# Patient Record
Sex: FEMALE | Race: WHITE | URBAN - METROPOLITAN AREA
[De-identification: names, ages, dates, MRNs, and addresses within clinical notes are randomized per-mention and may not be internally consistent; named-entity substitution may affect disease eponyms.]

---

## 2017-02-27 ENCOUNTER — INPATIENT (INPATIENT)
Facility: HOSPITAL | Age: 82
LOS: 3 days | Discharge: HOME HEALTH | End: 2017-03-03
Attending: INTERNAL MEDICINE

## 2017-06-27 DIAGNOSIS — K92.2 GASTROINTESTINAL HEMORRHAGE, UNSPECIFIED: ICD-10-CM

## 2017-06-27 DIAGNOSIS — K25.9 GASTRIC ULCER, UNSPECIFIED AS ACUTE OR CHRONIC, WITHOUT HEMORRHAGE OR PERFORATION: ICD-10-CM

## 2017-06-27 DIAGNOSIS — K29.80 DUODENITIS WITHOUT BLEEDING: ICD-10-CM

## 2017-06-27 DIAGNOSIS — D62 ACUTE POSTHEMORRHAGIC ANEMIA: ICD-10-CM

## 2017-06-27 DIAGNOSIS — R09.02 HYPOXEMIA: ICD-10-CM

## 2017-06-27 DIAGNOSIS — J69.0 PNEUMONITIS DUE TO INHALATION OF FOOD AND VOMIT: ICD-10-CM

## 2017-06-27 DIAGNOSIS — K44.9 DIAPHRAGMATIC HERNIA WITHOUT OBSTRUCTION OR GANGRENE: ICD-10-CM

## 2017-06-27 DIAGNOSIS — J44.9 CHRONIC OBSTRUCTIVE PULMONARY DISEASE, UNSPECIFIED: ICD-10-CM

## 2017-06-27 DIAGNOSIS — K59.00 CONSTIPATION, UNSPECIFIED: ICD-10-CM

## 2017-06-27 DIAGNOSIS — K20.9 ESOPHAGITIS, UNSPECIFIED: ICD-10-CM

## 2018-04-12 PROBLEM — Z00.00 ENCOUNTER FOR PREVENTIVE HEALTH EXAMINATION: Status: ACTIVE | Noted: 2018-04-12

## 2018-04-13 DIAGNOSIS — Z87.891 PERSONAL HISTORY OF NICOTINE DEPENDENCE: ICD-10-CM

## 2018-04-13 DIAGNOSIS — Z82.49 FAMILY HISTORY OF ISCHEMIC HEART DISEASE AND OTHER DISEASES OF THE CIRCULATORY SYSTEM: ICD-10-CM

## 2018-04-13 DIAGNOSIS — I48.91 UNSPECIFIED ATRIAL FIBRILLATION: ICD-10-CM

## 2018-04-13 DIAGNOSIS — Z80.1 FAMILY HISTORY OF MALIGNANT NEOPLASM OF TRACHEA, BRONCHUS AND LUNG: ICD-10-CM

## 2018-04-13 DIAGNOSIS — Z87.19 PERSONAL HISTORY OF OTHER DISEASES OF THE DIGESTIVE SYSTEM: ICD-10-CM

## 2018-04-13 DIAGNOSIS — Z80.42 FAMILY HISTORY OF MALIGNANT NEOPLASM OF PROSTATE: ICD-10-CM

## 2018-04-13 DIAGNOSIS — Z80.7 FAMILY HISTORY OF OTHER MALIGNANT NEOPLASMS OF LYMPHOID, HEMATOPOIETIC AND RELATED TISSUES: ICD-10-CM

## 2018-04-19 ENCOUNTER — APPOINTMENT (OUTPATIENT)
Dept: CARDIOLOGY | Facility: CLINIC | Age: 83
End: 2018-04-19

## 2018-04-19 VITALS — RESPIRATION RATE: 18 BRPM | DIASTOLIC BLOOD PRESSURE: 80 MMHG | SYSTOLIC BLOOD PRESSURE: 120 MMHG | HEART RATE: 92 BPM

## 2018-04-19 VITALS — BODY MASS INDEX: 30.23 KG/M2 | HEIGHT: 58 IN | WEIGHT: 144 LBS

## 2018-04-19 DIAGNOSIS — R10.9 UNSPECIFIED ABDOMINAL PAIN: ICD-10-CM

## 2018-04-19 DIAGNOSIS — J44.9 CHRONIC OBSTRUCTIVE PULMONARY DISEASE, UNSPECIFIED: ICD-10-CM

## 2018-04-26 ENCOUNTER — APPOINTMENT (OUTPATIENT)
Dept: CARDIOLOGY | Facility: CLINIC | Age: 83
End: 2018-04-26

## 2018-05-10 ENCOUNTER — APPOINTMENT (OUTPATIENT)
Dept: CARDIOLOGY | Facility: CLINIC | Age: 83
End: 2018-05-10

## 2018-06-29 ENCOUNTER — INPATIENT (INPATIENT)
Facility: HOSPITAL | Age: 83
LOS: 9 days | Discharge: NURSING, REHAB OR LONG-TERM CARE | End: 2018-07-09
Attending: INTERNAL MEDICINE | Admitting: INTERNAL MEDICINE
Payer: MEDICARE

## 2018-06-29 VITALS
TEMPERATURE: 98 F | RESPIRATION RATE: 20 BRPM | WEIGHT: 145.06 LBS | SYSTOLIC BLOOD PRESSURE: 217 MMHG | OXYGEN SATURATION: 96 % | DIASTOLIC BLOOD PRESSURE: 110 MMHG | HEIGHT: 58 IN | HEART RATE: 96 BPM

## 2018-06-29 DIAGNOSIS — Z90.49 ACQUIRED ABSENCE OF OTHER SPECIFIED PARTS OF DIGESTIVE TRACT: Chronic | ICD-10-CM

## 2018-06-29 LAB
ALBUMIN SERPL ELPH-MCNC: 4.1 G/DL — SIGNIFICANT CHANGE UP (ref 3.5–5.2)
ALP SERPL-CCNC: 64 U/L — SIGNIFICANT CHANGE UP (ref 30–115)
ALT FLD-CCNC: 7 U/L — SIGNIFICANT CHANGE UP (ref 0–41)
ANION GAP SERPL CALC-SCNC: 17 MMOL/L — HIGH (ref 7–14)
AST SERPL-CCNC: 30 U/L — SIGNIFICANT CHANGE UP (ref 0–41)
BASOPHILS # BLD AUTO: 0.05 K/UL — SIGNIFICANT CHANGE UP (ref 0–0.2)
BASOPHILS NFR BLD AUTO: 0.4 % — SIGNIFICANT CHANGE UP (ref 0–1)
BILIRUB SERPL-MCNC: 1 MG/DL — SIGNIFICANT CHANGE UP (ref 0.2–1.2)
BUN SERPL-MCNC: 19 MG/DL — SIGNIFICANT CHANGE UP (ref 10–20)
CALCIUM SERPL-MCNC: 9 MG/DL — SIGNIFICANT CHANGE UP (ref 8.5–10.1)
CHLORIDE SERPL-SCNC: 99 MMOL/L — SIGNIFICANT CHANGE UP (ref 98–110)
CO2 SERPL-SCNC: 22 MMOL/L — SIGNIFICANT CHANGE UP (ref 17–32)
CREAT SERPL-MCNC: 0.9 MG/DL — SIGNIFICANT CHANGE UP (ref 0.7–1.5)
EOSINOPHIL # BLD AUTO: 0.04 K/UL — SIGNIFICANT CHANGE UP (ref 0–0.7)
EOSINOPHIL NFR BLD AUTO: 0.3 % — SIGNIFICANT CHANGE UP (ref 0–8)
GLUCOSE SERPL-MCNC: 109 MG/DL — HIGH (ref 70–99)
HCT VFR BLD CALC: 40.4 % — SIGNIFICANT CHANGE UP (ref 37–47)
HGB BLD-MCNC: 13.2 G/DL — SIGNIFICANT CHANGE UP (ref 12–16)
IMM GRANULOCYTES NFR BLD AUTO: 0.5 % — HIGH (ref 0.1–0.3)
LACTATE SERPL-SCNC: 1.2 MMOL/L — SIGNIFICANT CHANGE UP (ref 0.5–2.2)
LYMPHOCYTES # BLD AUTO: 0.77 K/UL — LOW (ref 1.2–3.4)
LYMPHOCYTES # BLD AUTO: 5.8 % — LOW (ref 20.5–51.1)
MCHC RBC-ENTMCNC: 26.7 PG — LOW (ref 27–31)
MCHC RBC-ENTMCNC: 32.7 G/DL — SIGNIFICANT CHANGE UP (ref 32–37)
MCV RBC AUTO: 81.6 FL — SIGNIFICANT CHANGE UP (ref 81–99)
MONOCYTES # BLD AUTO: 0.66 K/UL — HIGH (ref 0.1–0.6)
MONOCYTES NFR BLD AUTO: 5 % — SIGNIFICANT CHANGE UP (ref 1.7–9.3)
NEUTROPHILS # BLD AUTO: 11.7 K/UL — HIGH (ref 1.4–6.5)
NEUTROPHILS NFR BLD AUTO: 88 % — HIGH (ref 42.2–75.2)
NRBC # BLD: 0 /100 WBCS — SIGNIFICANT CHANGE UP (ref 0–0)
PLATELET # BLD AUTO: 247 K/UL — SIGNIFICANT CHANGE UP (ref 130–400)
POTASSIUM SERPL-MCNC: 5.3 MMOL/L — HIGH (ref 3.5–5)
POTASSIUM SERPL-SCNC: 5.3 MMOL/L — HIGH (ref 3.5–5)
PROT SERPL-MCNC: 7.9 G/DL — SIGNIFICANT CHANGE UP (ref 6–8)
RBC # BLD: 4.95 M/UL — SIGNIFICANT CHANGE UP (ref 4.2–5.4)
RBC # FLD: 18.3 % — HIGH (ref 11.5–14.5)
SODIUM SERPL-SCNC: 138 MMOL/L — SIGNIFICANT CHANGE UP (ref 135–146)
WBC # BLD: 13.28 K/UL — HIGH (ref 4.8–10.8)
WBC # FLD AUTO: 13.28 K/UL — HIGH (ref 4.8–10.8)

## 2018-06-29 PROCEDURE — 99223 1ST HOSP IP/OBS HIGH 75: CPT

## 2018-06-29 RX ORDER — SODIUM CHLORIDE 9 MG/ML
3 INJECTION INTRAMUSCULAR; INTRAVENOUS; SUBCUTANEOUS EVERY 8 HOURS
Refills: 0 | Status: DISCONTINUED | OUTPATIENT
Start: 2018-06-29 | End: 2018-06-29

## 2018-06-29 RX ADMIN — Medication 100 MILLIGRAM(S): at 17:13

## 2018-06-29 NOTE — ED PROVIDER NOTE - ATTENDING CONTRIBUTION TO CARE
tender mass left submandibular area.  some elevation of the floor of the mouth.  a/w intact.  no stridor.  hx of previous salivary gland infection.  case d/w dr. howard PTA.  IV abx ordered.  await CT.

## 2018-06-29 NOTE — ED PROVIDER NOTE - NS ED ROS FT
CONSTITUTIONAL: No weakness, fevers, or chills  EYES/ENT: + oral abscess + pain with swallowing and speaking. No visual changes. No ear pain. No hoarseness or drooling.  NECK: No pain or stiffness  RESPIRATORY: No cough. No shortness of breath. No difficulty breathing.  CARDIOVASCULAR: No chest pain or palpitations  GASTROINTESTINAL: No abdominal or epigastric pain. No nausea or vomiting  GENITOURINARY: No dysuria or hematuria  NEUROLOGICAL: No numbness or weakness or tingling to extremities. No headache or dizziness.  SKIN: No itching, rashes  MSK: No back pain.

## 2018-06-29 NOTE — ED PROVIDER NOTE - OBJECTIVE STATEMENT
89 yo female with PMH of COPD, PUD sent to the ED for left sided oral abscess and infected salivary gland after seeing her PMD (Dr. Matthews) earlier today.  Patient admits to painful swallowing and speaking. Patient denies fever, chills, dizziness, difficulty breathing, chest pain, abdominal pain, N/V, ear pain, recent illness, vision changes, hoarseness, or drooling. 89 yo female with PMH of COPD, PUD sent to the ED for left sided oral abscess and infected salivary gland after seeing her PMD (Dr. Tirado) earlier today.  Patient admits to painful swallowing and speaking. Patient denies fever, chills, dizziness, difficulty breathing, chest pain, abdominal pain, N/V, ear pain, recent illness, vision changes, hoarseness, or drooling. 89 yo female with PMH of COPD, PUD sent to the ED for left sided oral abscess and infected salivary gland after seeing her PMD (Dr. Tirdao) earlier today.  Patient admits to painful swallowing and speaking. Patient speaking with lower tone of voice.  Patient denies fever, chills, dizziness, difficulty breathing, chest pain, abdominal pain, N/V, ear pain, recent illness, vision changes, hoarseness, or drooling.

## 2018-06-29 NOTE — ED PROVIDER NOTE - MEDICAL DECISION MAKING DETAILS
chart done 89f w hx of COPD & PUD. Pt sent in by PMD for eval of possibly infected salivary gland similar to prior that resolved w IV abx. Nontoxic appearing. CT soft tissue showing phlegmon. Abx given. ENT consulted at [facility] and patient admitted to [facility] for further care & management.

## 2018-06-29 NOTE — ED PROVIDER NOTE - PHYSICAL EXAMINATION
GENERAL: NAD, well-developed  ENT: Airway clear. Uvula midline. + erythematous mass under left side of tongue (Tongue pushed up and toward the right due to mass). Mild pharyngeal erythema. MMM. Good dentation. TMs with good cone of light B/L.   CHEST/LUNG: Clear to auscultation bilaterally; No wheeze, rhonchi, or rales  HEART: Regular rate and rhythm; No murmurs, rubs, or gallops  ABDOMEN: Soft, Nontender, Nondistended; Bowel sounds present x 4   EXTREMITIES:  Radial pulses present B/L.  PSYCH: AAOx3, cooperative, appropriate  SKIN: No rashes or lesions GENERAL: NAD, well-developed  ENT: Airway clear. Uvula midline. + erythematous mass under left side of tongue and submandibular on left (Tongue pushed up and toward the right due to swelling). +mild erythema to left submandibular area, Mild pharyngeal erythema. MMM. Good dentition. TMs with good cone of light B/L.   CHEST/LUNG: Clear to auscultation bilaterally; No wheeze, rhonchi, or rales  HEART: Regular rate and rhythm; No murmurs, rubs, or gallops  ABDOMEN: Soft, Nontender, Nondistended; Bowel sounds present x 4   EXTREMITIES:  Radial pulses present B/L.  PSYCH: AAOx3, cooperative, appropriate  SKIN: No rashes or lesions GENERAL: NAD, well-developed  ENT: Airway clear. Uvula midline. + erythematous mass under left side of tongue and submandibular (Tongue pushed up and toward the right due to swelling). +mild erythema to left submandibular area, + left sided facial swelling. Mild pharyngeal erythema. MMM. Good dentition. TMs with good cone of light B/L.   CHEST/LUNG: Clear to auscultation bilaterally; No wheeze, rhonchi, or rales  HEART: Regular rate and rhythm; No murmurs, rubs, or gallops  ABDOMEN: Soft, Nontender, Nondistended; Bowel sounds present x 4   EXTREMITIES:  Radial pulses present B/L.  PSYCH: AAOx3, cooperative, appropriate  SKIN: No rashes or lesions

## 2018-06-29 NOTE — ED PROVIDER NOTE - PROGRESS NOTE DETAILS
patient seen by fela romero under direct supervision of fela painting, patient with trismus, elevated tongue and swelling to submandibular area, pending ct neck Patient sent by Dr. Tirado (patient's PMD) after being seen earlier today for infected salivary gland or abscess visible under patients tongue on left side. Received from Dr Wheeler. 89f w hx of COPD & PUD. Pt sent in by PMD for eval of possibly infected salivary gland similar to prior that resolved w IV abx. Nontoxic appearing. CT soft tissue pending. Already received Abx. CT soft tissue neck with phelgmon changes ENT paged, will admit [place] [facility] under Dr carr Spoke to patient about plan to be transferred to [facility] to be seen by ENT service and Dr. Hutchins will see patient tomorrow at [place]. Patient agrees with plan. Spoke to ENT JC Villasenor. Aware patient is being transferred to [facility] and states he will see the patient. Spoke to MAR at [facility]. Aware of patient being transferred to [facility] and will see patient at arrival.

## 2018-06-29 NOTE — ED PROVIDER NOTE - PMH
Chronic obstructive pulmonary disease    PUD (peptic ulcer disease)    PVD (peripheral vascular disease)

## 2018-06-29 NOTE — ED PROVIDER NOTE - DIAGNOSIS COUNSELING, MDM
conducted a detailed discussion... Patient to be admitted to an inpatient floor. Case discussed with and care endorsed to medical admitting resident. Admitting physician notified.

## 2018-06-30 DIAGNOSIS — Z90.49 ACQUIRED ABSENCE OF OTHER SPECIFIED PARTS OF DIGESTIVE TRACT: Chronic | ICD-10-CM

## 2018-06-30 LAB
ANION GAP SERPL CALC-SCNC: 14 MMOL/L — SIGNIFICANT CHANGE UP (ref 7–14)
BUN SERPL-MCNC: 18 MG/DL — SIGNIFICANT CHANGE UP (ref 10–20)
CALCIUM SERPL-MCNC: 9 MG/DL — SIGNIFICANT CHANGE UP (ref 8.5–10.1)
CHLORIDE SERPL-SCNC: 99 MMOL/L — SIGNIFICANT CHANGE UP (ref 98–110)
CK MB CFR SERPL CALC: 1.1 NG/ML — SIGNIFICANT CHANGE UP (ref 0.6–6.3)
CK SERPL-CCNC: 31 U/L — SIGNIFICANT CHANGE UP (ref 0–225)
CO2 SERPL-SCNC: 22 MMOL/L — SIGNIFICANT CHANGE UP (ref 17–32)
CREAT SERPL-MCNC: 0.8 MG/DL — SIGNIFICANT CHANGE UP (ref 0.7–1.5)
GLUCOSE SERPL-MCNC: 154 MG/DL — HIGH (ref 70–99)
POTASSIUM SERPL-MCNC: 3.9 MMOL/L — SIGNIFICANT CHANGE UP (ref 3.5–5)
POTASSIUM SERPL-SCNC: 3.9 MMOL/L — SIGNIFICANT CHANGE UP (ref 3.5–5)
SODIUM SERPL-SCNC: 135 MMOL/L — SIGNIFICANT CHANGE UP (ref 135–146)
TROPONIN T SERPL-MCNC: <0.01 NG/ML — SIGNIFICANT CHANGE UP

## 2018-06-30 RX ORDER — ONDANSETRON 8 MG/1
4 TABLET, FILM COATED ORAL EVERY 6 HOURS
Refills: 0 | Status: DISCONTINUED | OUTPATIENT
Start: 2018-06-30 | End: 2018-07-09

## 2018-06-30 RX ORDER — AMPICILLIN SODIUM AND SULBACTAM SODIUM 250; 125 MG/ML; MG/ML
INJECTION, POWDER, FOR SUSPENSION INTRAMUSCULAR; INTRAVENOUS
Refills: 0 | Status: DISCONTINUED | OUTPATIENT
Start: 2018-06-30 | End: 2018-06-30

## 2018-06-30 RX ORDER — IBUPROFEN 200 MG
400 TABLET ORAL EVERY 6 HOURS
Refills: 0 | Status: DISCONTINUED | OUTPATIENT
Start: 2018-06-30 | End: 2018-07-09

## 2018-06-30 RX ORDER — AMPICILLIN SODIUM AND SULBACTAM SODIUM 250; 125 MG/ML; MG/ML
3 INJECTION, POWDER, FOR SUSPENSION INTRAMUSCULAR; INTRAVENOUS ONCE
Refills: 0 | Status: DISCONTINUED | OUTPATIENT
Start: 2018-06-30 | End: 2018-06-30

## 2018-06-30 RX ORDER — SENNA PLUS 8.6 MG/1
2 TABLET ORAL AT BEDTIME
Refills: 0 | Status: DISCONTINUED | OUTPATIENT
Start: 2018-06-30 | End: 2018-06-30

## 2018-06-30 RX ORDER — PROPOFOL 10 MG/ML
20 INJECTION, EMULSION INTRAVENOUS
Qty: 500 | Refills: 0 | Status: DISCONTINUED | OUTPATIENT
Start: 2018-06-30 | End: 2018-06-30

## 2018-06-30 RX ORDER — HEPARIN SODIUM 5000 [USP'U]/ML
5000 INJECTION INTRAVENOUS; SUBCUTANEOUS EVERY 8 HOURS
Refills: 0 | Status: DISCONTINUED | OUTPATIENT
Start: 2018-06-30 | End: 2018-07-02

## 2018-06-30 RX ORDER — DOCUSATE SODIUM 100 MG
100 CAPSULE ORAL THREE TIMES A DAY
Refills: 0 | Status: DISCONTINUED | OUTPATIENT
Start: 2018-06-30 | End: 2018-06-30

## 2018-06-30 RX ORDER — SODIUM CHLORIDE 9 MG/ML
1000 INJECTION, SOLUTION INTRAVENOUS
Refills: 0 | Status: DISCONTINUED | OUTPATIENT
Start: 2018-06-30 | End: 2018-06-30

## 2018-06-30 RX ORDER — SENNA PLUS 8.6 MG/1
2 TABLET ORAL AT BEDTIME
Refills: 0 | Status: DISCONTINUED | OUTPATIENT
Start: 2018-06-30 | End: 2018-07-09

## 2018-06-30 RX ORDER — SODIUM CHLORIDE 9 MG/ML
1000 INJECTION, SOLUTION INTRAVENOUS
Refills: 0 | Status: DISCONTINUED | OUTPATIENT
Start: 2018-06-30 | End: 2018-07-01

## 2018-06-30 RX ORDER — HYDROMORPHONE HYDROCHLORIDE 2 MG/ML
1 INJECTION INTRAMUSCULAR; INTRAVENOUS; SUBCUTANEOUS
Refills: 0 | Status: DISCONTINUED | OUTPATIENT
Start: 2018-06-30 | End: 2018-07-01

## 2018-06-30 RX ORDER — ONDANSETRON 8 MG/1
4 TABLET, FILM COATED ORAL EVERY 6 HOURS
Refills: 0 | Status: DISCONTINUED | OUTPATIENT
Start: 2018-06-30 | End: 2018-06-30

## 2018-06-30 RX ORDER — IBUPROFEN 200 MG
400 TABLET ORAL EVERY 6 HOURS
Refills: 0 | Status: DISCONTINUED | OUTPATIENT
Start: 2018-06-30 | End: 2018-06-30

## 2018-06-30 RX ORDER — DEXAMETHASONE 0.5 MG/5ML
4 ELIXIR ORAL EVERY 4 HOURS
Refills: 0 | Status: DISCONTINUED | OUTPATIENT
Start: 2018-06-30 | End: 2018-07-03

## 2018-06-30 RX ORDER — DEXAMETHASONE 0.5 MG/5ML
4 ELIXIR ORAL EVERY 4 HOURS
Refills: 0 | Status: DISCONTINUED | OUTPATIENT
Start: 2018-06-30 | End: 2018-06-30

## 2018-06-30 RX ORDER — AMPICILLIN SODIUM AND SULBACTAM SODIUM 250; 125 MG/ML; MG/ML
3 INJECTION, POWDER, FOR SUSPENSION INTRAMUSCULAR; INTRAVENOUS EVERY 6 HOURS
Refills: 0 | Status: DISCONTINUED | OUTPATIENT
Start: 2018-06-30 | End: 2018-07-05

## 2018-06-30 RX ORDER — PROPOFOL 10 MG/ML
30 INJECTION, EMULSION INTRAVENOUS
Qty: 1000 | Refills: 0 | Status: DISCONTINUED | OUTPATIENT
Start: 2018-06-30 | End: 2018-07-03

## 2018-06-30 RX ORDER — AMPICILLIN SODIUM AND SULBACTAM SODIUM 250; 125 MG/ML; MG/ML
1.5 INJECTION, POWDER, FOR SUSPENSION INTRAMUSCULAR; INTRAVENOUS EVERY 6 HOURS
Refills: 0 | Status: DISCONTINUED | OUTPATIENT
Start: 2018-06-30 | End: 2018-06-30

## 2018-06-30 RX ORDER — FENTANYL CITRATE 50 UG/ML
0.5 INJECTION INTRAVENOUS
Qty: 2500 | Refills: 0 | Status: DISCONTINUED | OUTPATIENT
Start: 2018-06-30 | End: 2018-07-03

## 2018-06-30 RX ORDER — HEPARIN SODIUM 5000 [USP'U]/ML
5000 INJECTION INTRAVENOUS; SUBCUTANEOUS EVERY 8 HOURS
Refills: 0 | Status: DISCONTINUED | OUTPATIENT
Start: 2018-06-30 | End: 2018-06-30

## 2018-06-30 RX ORDER — AMPICILLIN SODIUM AND SULBACTAM SODIUM 250; 125 MG/ML; MG/ML
3 INJECTION, POWDER, FOR SUSPENSION INTRAMUSCULAR; INTRAVENOUS EVERY 6 HOURS
Refills: 0 | Status: DISCONTINUED | OUTPATIENT
Start: 2018-06-30 | End: 2018-06-30

## 2018-06-30 RX ORDER — MORPHINE SULFATE 50 MG/1
4 CAPSULE, EXTENDED RELEASE ORAL
Refills: 0 | Status: DISCONTINUED | OUTPATIENT
Start: 2018-06-30 | End: 2018-07-01

## 2018-06-30 RX ORDER — DEXAMETHASONE 0.5 MG/5ML
10 ELIXIR ORAL ONCE
Refills: 0 | Status: COMPLETED | OUTPATIENT
Start: 2018-06-30 | End: 2018-06-30

## 2018-06-30 RX ORDER — DOCUSATE SODIUM 100 MG
100 CAPSULE ORAL THREE TIMES A DAY
Refills: 0 | Status: DISCONTINUED | OUTPATIENT
Start: 2018-06-30 | End: 2018-07-09

## 2018-06-30 RX ORDER — AMPICILLIN SODIUM AND SULBACTAM SODIUM 250; 125 MG/ML; MG/ML
1.5 INJECTION, POWDER, FOR SUSPENSION INTRAMUSCULAR; INTRAVENOUS ONCE
Refills: 0 | Status: DISCONTINUED | OUTPATIENT
Start: 2018-06-30 | End: 2018-06-30

## 2018-06-30 RX ADMIN — Medication 4 MILLIGRAM(S): at 15:53

## 2018-06-30 RX ADMIN — FENTANYL CITRATE 3.05 MICROGRAM(S)/KG/HR: 50 INJECTION INTRAVENOUS at 16:25

## 2018-06-30 RX ADMIN — Medication 102 MILLIGRAM(S): at 10:19

## 2018-06-30 RX ADMIN — Medication 4 MILLIGRAM(S): at 19:00

## 2018-06-30 RX ADMIN — SODIUM CHLORIDE 50 MILLILITER(S): 9 INJECTION, SOLUTION INTRAVENOUS at 14:35

## 2018-06-30 RX ADMIN — Medication 100 MILLIGRAM(S): at 10:19

## 2018-06-30 RX ADMIN — AMPICILLIN SODIUM AND SULBACTAM SODIUM 200 GRAM(S): 250; 125 INJECTION, POWDER, FOR SUSPENSION INTRAMUSCULAR; INTRAVENOUS at 18:30

## 2018-06-30 RX ADMIN — SODIUM CHLORIDE 75 MILLILITER(S): 9 INJECTION, SOLUTION INTRAVENOUS at 15:51

## 2018-06-30 RX ADMIN — Medication 4 MILLIGRAM(S): at 10:21

## 2018-06-30 RX ADMIN — PROPOFOL 10.98 MICROGRAM(S)/KG/MIN: 10 INJECTION, EMULSION INTRAVENOUS at 15:11

## 2018-06-30 RX ADMIN — Medication 100 MILLIGRAM(S): at 22:10

## 2018-06-30 RX ADMIN — Medication 100 MILLIGRAM(S): at 16:01

## 2018-06-30 RX ADMIN — Medication 4 MILLIGRAM(S): at 22:07

## 2018-06-30 RX ADMIN — HEPARIN SODIUM 5000 UNIT(S): 5000 INJECTION INTRAVENOUS; SUBCUTANEOUS at 16:04

## 2018-06-30 RX ADMIN — HEPARIN SODIUM 5000 UNIT(S): 5000 INJECTION INTRAVENOUS; SUBCUTANEOUS at 22:05

## 2018-06-30 NOTE — CONSULT NOTE ADULT - SUBJECTIVE AND OBJECTIVE BOX
JANINE SNOW  89y, Female  Allergy: No Known Allergies      HPI:  89 y o f with pmh  of COPD ? and GERD presented to ER with c/o left sided facial swelling , difficulty swallowing and muffled voice for 2 days. Pt presented to ER [facility] initially and was transferred to [place] for ENT evaluation as Ct neck showed prelim findings of peritonsillar abscess. Pt reports that she felt left sided facial swelling 2 days ago and noticed change in her voice quality. Pt also reports (30 Jun 2018 09:06)    FAMILY HISTORY:    PAST MEDICAL & SURGICAL HISTORY:  PUD (peptic ulcer disease)  PVD (peripheral vascular disease)  Chronic obstructive pulmonary disease  History of colon resection        VITALS:  T(F): 98, Max: 98 (06-30-18 @ 04:58)  HR: 119  BP: 157/81  RR: 18Vital Signs Last 24 Hrs  T(C): 36.7 (30 Jun 2018 04:58), Max: 36.7 (30 Jun 2018 04:58)  T(F): 98 (30 Jun 2018 04:58), Max: 98 (30 Jun 2018 04:58)  HR: 119 (30 Jun 2018 04:58) (86 - 119)  BP: 157/81 (30 Jun 2018 04:58) (141/80 - 217/110)  BP(mean): --  RR: 18 (30 Jun 2018 04:58) (18 - 20)  SpO2: 94% (29 Jun 2018 23:42) (94% - 96%)    TESTS & MEASUREMENTS:                        13.2   13.28 )-----------( 247      ( 29 Jun 2018 18:04 )             40.4     06-29    138  |  99  |  19  ----------------------------<  109<H>  5.3<H>   |  22  |  0.9    Ca    9.0      29 Jun 2018 18:04    TPro  7.9  /  Alb  4.1  /  TBili  1.0  /  DBili  x   /  AST  30  /  ALT  7   /  AlkPhos  64  06-29    LIVER FUNCTIONS - ( 29 Jun 2018 18:04 )  Alb: 4.1 g/dL / Pro: 7.9 g/dL / ALK PHOS: 64 U/L / ALT: 7 U/L / AST: 30 U/L / GGT: x                   RADIOLOGY & ADDITIONAL TESTS:    ANTIBIOTICS:  ampicillin/sulbactam  IVPB      ampicillin/sulbactam  IVPB 1.5 Gram(s) IV Intermittent once  ampicillin/sulbactam  IVPB 1.5 Gram(s) IV Intermittent every 6 hours  clindamycin IVPB 600 milliGRAM(s) IV Intermittent once  clindamycin IVPB 600 milliGRAM(s) IV Intermittent every 8 hours  clindamycin IVPB

## 2018-06-30 NOTE — H&P ADULT - NSHPLABSRESULTS_GEN_ALL_CORE
13.2   13.28 )-----------( 247      ( 29 Jun 2018 18:04 )             40.4       06-29    138  |  99  |  19  ----------------------------<  109<H>  5.3<H>   |  22  |  0.9    Ca    9.0      29 Jun 2018 18:04    TPro  7.9  /  Alb  4.1  /  TBili  1.0  /  DBili  x   /  AST  30  /  ALT  7   /  AlkPhos  64  06-29                      Lactate Trend  06-29 @ 18:04 Lactate:1.2             CAPILLARY BLOOD GLUCOSE

## 2018-06-30 NOTE — H&P ADULT - PMH
Chronic obstructive pulmonary disease    Diverticulosis    Hiatal hernia    Osteoarthritis    PUD (peptic ulcer disease)

## 2018-06-30 NOTE — PROGRESS NOTE ADULT - SUBJECTIVE AND OBJECTIVE BOX
89 yo female transferred from [facility] for difficulty swallowing ans left neck swelling. Pt states left neck swelling started 2 days ago, she presented to ED due to voice change and inability to tolerate secretions. Pt denies n/v/f/c or sick contacts. Pt denies very recent dental work, but in the past few months she has had posts for dental implants placed    PAST MEDICAL & SURGICAL HISTORY:  Osteoarthritis  Diverticulosis  Hiatal hernia  PUD (peptic ulcer disease)  Chronic obstructive pulmonary disease  S/P laparoscopic-assisted sigmoidectomy     MEDICATIONS  (STANDING):  ampicillin/sulbactam  IVPB      ampicillin/sulbactam  IVPB 3 Gram(s) IV Intermittent once  ampicillin/sulbactam  IVPB 3 Gram(s) IV Intermittent every 6 hours  clindamycin IVPB 600 milliGRAM(s) IV Intermittent every 8 hours  clindamycin IVPB      dexamethasone  Injectable 4 milliGRAM(s) IV Push every 4 hours  dextrose 5% + sodium chloride 0.9%. 1000 milliLiter(s) (75 mL/Hr) IV Continuous <Continuous>  docusate sodium 100 milliGRAM(s) Oral three times a day  heparin  Injectable 5000 Unit(s) SubCutaneous every 8 hours    MEDICATIONS  (PRN):  aluminum hydroxide/magnesium hydroxide/simethicone Suspension 30 milliLiter(s) Oral every 4 hours PRN Dyspepsia  ibuprofen  Tablet 400 milliGRAM(s) Oral every 6 hours PRN Mild pain  ondansetron Injectable 4 milliGRAM(s) IV Push every 6 hours PRN Nausea  senna 2 Tablet(s) Oral at bedtime PRN Constipation    Allergies    No Known Allergies    Intolerances    SHx - NC    FHx - NC    Vital Signs Last 24 Hrs  T(C): 36.7 (30 Jun 2018 04:58), Max: 36.7 (30 Jun 2018 04:58)  T(F): 98 (30 Jun 2018 04:58), Max: 98 (30 Jun 2018 04:58)  HR: 119 (30 Jun 2018 04:58) (86 - 119)  BP: 157/81 (30 Jun 2018 04:58) (141/80 - 217/110)  RR: 18 (30 Jun 2018 04:58) (18 - 20)  SpO2: 94% (29 Jun 2018 23:42) (94% - 96%)    pt seen and examined at bedside  a+ox3, +spitting up secretions, very hoarse and gurgly vocal quality, no stridor, no respiratory distress  nc/at, perrl  neck - left submandibular gland is very indurated, ttp with resulting swelling of left neck, +neck and proximal chest erythema, warm to touch  oc - minimal trismus, +left floor of mouth swelling very ttp, +submental swelling unable to express any pus from salivary duct, tongue effaced superior/posterior due to FOM swelling uvula not swollen, midline, no pharyngeal erythema noted  FFL - np - wnl, bot elevated but wnl, epiglottis edematous on left side, erythematous, true vocal cords are not swollen, airway is patent and moving well, left aryepiglottic fold is edematous, left piriform sinus is effaced due to swelling, right aryepiglottic fold and piriform sinus wnl, moderate pooling of secretions noted in the glottis, overall left glottic structures are edematous causing mass effect on the airway                           13.2   13.28 )-----------( 247      ( 29 Jun 2018 18:04 )             40.4   < from: CT Neck Soft Tissue w/ IV Cont (06.29.18 @ 19:41) >  The left submandibular gland is enlarged and hyperdense. There are   extensive inflammatory changes surrounding the left submandibular gland   and along the left side of the neck to the level of the clavicles. There   are extensive inflammatory changes involving the left side of the floor   of the mouth and left side of the pharynx extending inferiorly to the   level of the vocal cords. The left side of the epiglottis appears   edematous and there is effacement of the left piriform sinus.    In the floor of the left side of the mouth there is a rounded   hyperdensity measuring 0.7 cm consistent with a left submandibular duct   stone.    The right submandibular gland and both parotid glands are normal in size   and CT attenuation.    Hypodense nodule within the right thyroid lobe measuring 8 x 5 mm.    Fibroticchanges and bronchiectasis within the bilateral upper lobes.    Degenerative changes of the cervical spine.    IMPRESSION:    Left submandibular gland sialoadenitis with a left submandibular duct   stone. Extensive inflammatory changes within the left side of the neck,   left floor of mouth, and left pharynx    < end of copied text >

## 2018-06-30 NOTE — PRE-ANESTHESIA EVALUATION ADULT - NSANTHPMHFT_GEN_ALL_CORE
89F admitted with difficulty swallowing, noted to have increased swelling of left sided glottic structures, causing mass effect. To come to OR for elective intubation, possible tracheostomy

## 2018-06-30 NOTE — PROGRESS NOTE ADULT - ASSESSMENT
IMPRESSION    L Sialadenitis  ? Epiglottitis  COPD  PUD    PLAN:    Transfer to ICU post intubation    CNS: Sedate with fentanyl, versed    PULM: CXR STAT and in AM  ENT f/up. may need drainage  f/up CT Neck report    CVS: I=O    GI: OG feeding  PPI    RENAL: f/up lytes  hand catheter    ID: Send blood cultures  ID consult  DVT prophylaxis

## 2018-06-30 NOTE — ED ADULT NURSE REASSESSMENT NOTE - NS ED NURSE REASSESS COMMENT FT1
Pt transferred [facility] at this time [facility] stretcher[facility]. Pt is AAOx3 breathing RA w/ normal WOB. Pt denied pain/discomfort.

## 2018-06-30 NOTE — CONSULT NOTE ADULT - ATTENDING COMMENTS
Called by ENT.  To be by bed side during intubation.   Pt examined.   Redness and swelling by the left submandibular area.   Intubated by Anesthesia by fibre optic.   Good visualization.   Good End tidal Co2.  No need of emergent tracheostomy.

## 2018-06-30 NOTE — PROGRESS NOTE ADULT - ASSESSMENT
89 yo female with left sialadenitis caused by 7mm stone in the salivary duct, resulting in Alan angina and edema of left neck, floor mouth and glottis, pt saturating well at the moment without respiratory distress, but unable to swallow and difficulty tolerating secretions, case discussed with Dr. Zapata, there is no drainable abscess in the pharynx, just the sialoadenitis with a stone.    Case discussed with  and pulmonary fellow , pt would benefit from prophylactic intubation for airway protection    plan  -prophylactic intubation  -IV steroids  -IV abx  -will follow  -when infection clears, will take care of sialolith

## 2018-06-30 NOTE — H&P ADULT - ATTENDING COMMENTS
PT seen and examined independently, I have read and agree with above exam and poa  Physical Exam: T(F): 98  	HR: 119  	BP: 157/81  	RR: 18  	SpO2: --      	PHYSICAL EXAM:  	GENERAL: Lying on bed , comfortable  	HEAD:  left submandibular swelling ; swelling on floor of mouth , spitting saliva on napkin , unable to swallow   	CHEST/LUNG: Clear to auscultation bilaterally; No wheeze  	HEART: Regular rate and rhythm; No murmurs, rubs, or gallops  	ABDOMEN: Soft, Nontender, Nondistended; Bowel sounds present  	EXTREMITIES:  2+ Peripheral Pulses, No clubbing, cyanosis, or edema  	PSYCH: AAOx3  	NEUROLOGY: non-focal  SKIN: No rashes or lesions  · Assessment    89 y o f with pmh  of COPD not on home O2 , hiatal hernia , OA , diverticulosis ,and PUD s/p UGIB in 2017 presented to ER with c/o left sided facial swelling , difficulty swallowing and muffled voice for 2 days.       # LEFT SUBMANDIBULAR SWELLING / SWELLING OF EPIGLOTTIS / LEUKOCYTOSIS    -likely SIALADENITIS  VRS PERITONSILLAR ABSCESS VRS EPIGLOTTITIS  -will give pt iv unasyn and clindamycin.  -iv steroids .  - spoke with critical care fellow about further management ; recommended that ENT should intubate the pt in OR as it would be difficult to intubate pt if swelling gets worse- for now will continue with current management until decision is made regarding intubation .  - pt will be upgraded to ICU .  - Iv hydration.  -spoke with ICU fellow pt will be INTUBATED in OR for airway compromise and then will be transferred to ICU.    # PUD :    -will c/w iv protonix.      # COPD:    -Stable , not wheezing.      # DVT:    -ppx with heparin     #dispo:    -FULL CODE FROM HOME.

## 2018-06-30 NOTE — CHART NOTE - NSCHARTNOTEFT_GEN_A_CORE
PACU ANESTHESIA ADMISSION NOTE      _x___ Intubated  TV: 400  Rate: 10      FiO2: 40%_    ____ Patent Airway    ____ Full return of protective reflexes    ____ Full recovery from anesthesia / sedation to baseline status    Vitals:    /65  RR 10 (vented)  O2sat. 97%  Temp: 9x8.8F      Mental Status:  ____ Awake   _____ Alert   _____ Drowsy   __x___ Sedated    Nausea/Vomiting: ____ Yes, See Post - Op Orders      __x__ No    Pain Scale (0-10): ___x__    Treatment: ____ None    ___x_ See Post - Op/PCA Orders    Post - Operative Fluids:   ____ Oral   _x__ See Post - Op Orders    Plan:  Discharge to:   ____Home       _____Floor      _x____Critical Care    _____ Other:_________________    Comments: s/p awake fiberoptic intubation in OR. No anesthesia complications. Pt's condition is stable in PACU. Full report is given to PACU RN and medical ICU Dr. Layne.

## 2018-06-30 NOTE — H&P ADULT - ASSESSMENT
89 y o f with pmh  of COPD not on home O2 , hiatal hernia , OA , diverticulosis ,and PUD s/p UGIB in 2017 presented to ER with c/o left sided facial swelling , difficulty swallowing and muffled voice for 2 days.       # LEFT SUBMANDIBULAR SWELLING / SWELLING OF EPIGLOTTIS / LEUKOCYTOSIS    -likely SIALADENITIS  VRS PERITONSILLAR ABSCESS VRS EPIGLOTTITIS  -will give pt iv unasyn and clindamycin.  -iv steroids .  - spoke with critical care fellow about further management ; recommended that ENT should intubate the pt in OR as it would be difficult to intubate pt if swelling gets worse- for now will continue with current management until decision is made regarding intubation .  - pt will be upgraded to ICU .  - Iv hydration.  -spoke with ICU fellow pt will be INTUBATED in OR for airway compromise and then will be transferred to ICU.    # PUD :    -will c/w iv protonix.      # COPD:    -Stable , not wheezing.      # DVT:    -ppx with heparin     #dispo:    -FULL CODE FROM HOME.

## 2018-06-30 NOTE — H&P ADULT - HISTORY OF PRESENT ILLNESS
89 y o f with pmh  of COPD ? and GERD presented to ER with c/o left sided facial swelling , difficulty swallowing and muffled voice for 2 days. Pt [place] and was transferred to [place] for ENT evaluation as Ct neck showed prelim findings of peritonsillar abscess. Pt reports that she felt left sided facial swelling 2 days ago and noticed change in her voice quality. Pt also reports 89 y o f with pmh  of COPD not on home O2 , hiatal hernia , OA , diverticulosis ,and PUD s/p UGIB in 2017 presented to ER with c/o left sided facial swelling , difficulty swallowing and muffled voice for 2 days. Pt [place] and was transferred to [place] for ENT evaluation as Ct neck showed prelim findings of peritonsillar abscess. Pt reports that she felt left sided facial swelling 2 days ago and noticed change in her voice quality. Pt also reports pain while swallowing to a point that pt is even unable to swallow her saliva. Pt denies any fever , chills , nausea , vomiting , cough or sob . Pt denies any recent dental interventions, any trauma to face.      In ER pt was found to have left sided facial swelling , Ct neck prelim findings were consistent with peritonsillar abscess, Pt was seen by ENT Pa and laryngoscope showed diffuse swelling of left side of mouth , swelling of epiglottis with secretions. Pt is saturating > 90% on room air , but will need to upgraded to ICU for impending airway compromise.

## 2018-06-30 NOTE — PROGRESS NOTE ADULT - SUBJECTIVE AND OBJECTIVE BOX
Patient is a 89y old  Female who presents with a chief complaint of Left sided facial swelling , difficulty swallowing and muffled voice for 2 days. Pt was admitted to the floor. Seen by ENT PA this morning and she spoke to medical resident that there is L sided floor of the mouth swelling and probable epiglottic swelling. I spoke to the ENT PA and advised to speak to her attending since if theres significant airway compromise and it  is an infectious process then she might need endotracheal intubation in OR before transfer to ICU. ENT attending (dr wise) agreed and prophylactic intubation.   When I saw the pt, she was having difficulty speaking and c/o pain in the floor of the mouth, also having some difficulty swallowing secretion from her mouth.       HPI:  89 y o f with pmh  of COPD not on home O2 , hiatal hernia , OA , diverticulosis ,and PUD s/p UGIB in 2017 presented to ER with c/o left sided facial swelling , difficulty swallowing and muffled voice for 2 days. [place] and was transferred to [place] for ENT evaluation as Ct neck showed prelim findings of peritonsillar abscess. Pt reports that she felt left sided facial swelling 2 days ago and noticed change in her voice quality. Pt also reports pain while swallowing to a point that pt is even unable to swallow her saliva. Pt denies any fever , chills , nausea , vomiting , cough or sob . Pt denies any recent dental interventions, any trauma to face.      In ER pt was found to have left sided facial swelling , Ct neck prelim findings were consistent with peritonsillar abscess, Pt was seen by ENT Pa and laryngoscope showed diffuse swelling of left side of mouth , swelling of epiglottis with secretions. Pt is saturating > 90% on room air , but will need to upgraded to ICU for impending airway compromise. (30 Jun 2018 09:06)      PAST MEDICAL & SURGICAL HISTORY:  Osteoarthritis  Diverticulosis  Hiatal hernia  PUD (peptic ulcer disease)  Chronic obstructive pulmonary disease  S/P laparoscopic-assisted sigmoidectomy      SOCIAL HX:   Smoking                     ETOH                            Other    FAMILY HISTORY:  No pertinent family history in first degree relatives      REVIEW OF SYSTEMS: as above    Allergies    No Known Allergies    Intolerances        aluminum hydroxide/magnesium hydroxide/simethicone Suspension 30 milliLiter(s) Oral every 4 hours PRN  ampicillin/sulbactam  IVPB      ampicillin/sulbactam  IVPB      ampicillin/sulbactam  IVPB 1.5 Gram(s) IV Intermittent once  clindamycin IVPB 600 milliGRAM(s) IV Intermittent every 8 hours  clindamycin IVPB      dexamethasone  Injectable 4 milliGRAM(s) IV Push every 4 hours  dextrose 5% + sodium chloride 0.9%. 1000 milliLiter(s) IV Continuous <Continuous>  docusate sodium 100 milliGRAM(s) Oral three times a day  heparin  Injectable 5000 Unit(s) SubCutaneous every 8 hours  ibuprofen  Tablet 400 milliGRAM(s) Oral every 6 hours PRN  ondansetron Injectable 4 milliGRAM(s) IV Push every 6 hours PRN  senna 2 Tablet(s) Oral at bedtime PRN  : Home Meds:          ICU Vital Signs Last 24 Hrs  T(C): 36.7 (30 Jun 2018 04:58), Max: 36.7 (30 Jun 2018 04:58)  T(F): 98 (30 Jun 2018 04:58), Max: 98 (30 Jun 2018 04:58)  HR: 119 (30 Jun 2018 04:58) (86 - 119)  BP: 157/81 (30 Jun 2018 04:58) (141/80 - 217/110)  BP(mean): --  ABP: --  ABP(mean): --  RR: 18 (30 Jun 2018 04:58) (18 - 20)  SpO2: 94% (29 Jun 2018 23:42) (94% - 96%)      General: sitting in bed, visible erythematous swelling under L mandible  HEENT:  Painful swelling, likely salivary gland, mandibular          Lymph Nodes: No cervical LN   Lungs: Bilateral BS  Cardiovascular: Regular  Abdomen: Soft, Positive BS  Extremities: No clubbing  Skin: Warm  Neurological: Non focal         LABS:                          13.2   13.28 )-----------( 247      ( 29 Jun 2018 18:04 )             40.4                                               06-29    138  |  99  |  19  ----------------------------<  109<H>  5.3<H>   |  22  |  0.9    Ca    9.0      29 Jun 2018 18:04    TPro  7.9  /  Alb  4.1  /  TBili  1.0  /  DBili  x   /  AST  30  /  ALT  7   /  AlkPhos  64  06-29                                                                                           LIVER FUNCTIONS - ( 29 Jun 2018 18:04 )  Alb: 4.1 g/dL / Pro: 7.9 g/dL / ALK PHOS: 64 U/L / ALT: 7 U/L / AST: 30 U/L / GGT: x                                                                                                                                       X-Rays                         No CXR available                                                            ECHO    MEDICATIONS  (STANDING):  ampicillin/sulbactam  IVPB      ampicillin/sulbactam  IVPB      ampicillin/sulbactam  IVPB 1.5 Gram(s) IV Intermittent once  clindamycin IVPB 600 milliGRAM(s) IV Intermittent every 8 hours  clindamycin IVPB      dexamethasone  Injectable 4 milliGRAM(s) IV Push every 4 hours  dextrose 5% + sodium chloride 0.9%. 1000 milliLiter(s) (75 mL/Hr) IV Continuous <Continuous>  docusate sodium 100 milliGRAM(s) Oral three times a day  heparin  Injectable 5000 Unit(s) SubCutaneous every 8 hours    MEDICATIONS  (PRN):  aluminum hydroxide/magnesium hydroxide/simethicone Suspension 30 milliLiter(s) Oral every 4 hours PRN Dyspepsia  ibuprofen  Tablet 400 milliGRAM(s) Oral every 6 hours PRN Mild pain  ondansetron Injectable 4 milliGRAM(s) IV Push every 6 hours PRN Nausea  senna 2 Tablet(s) Oral at bedtime PRN Constipation

## 2018-06-30 NOTE — CHART NOTE - NSCHARTNOTEFT_GEN_A_CORE
89F with hoa angina being managed by ENT service. Plan in place to intubate patient in OR for protective airway. ENT service requested general surgery be present in case there is need for emergent tracheostomy. Discussion was had with patient about risks of procedure and consent was obtained. Will continue to follow patient and be present in OR if needed 89F with hoa angina being managed by ENT service. Plan in place to intubate patient in OR for protective airway. ENT service requested general surgery be present in case there is need for emergent tracheostomy. Discussion was had with patient about risks of procedure and consent was obtained. Will continue to follow patient and be present in OR if needed  Senior resident on call note : patient was seen and examined. Discussed the case with Dr. Newton and he agrees with the plan;  89 y /o f W/ sialadenitis w/ possible estephania angina :  - need prophylacticintubation for airway portection and MICU :  - Will be aboard while intubation for need of surgical airway.

## 2018-06-30 NOTE — H&P ADULT - NSHPPHYSICALEXAM_GEN_ALL_CORE
T(F): 98  HR: 119  BP: 157/81  RR: 18  SpO2: --      PHYSICAL EXAM:  GENERAL: Lying on bed , comfortable  HEAD:  left submandibular swelling ; swelling on floor of mouth , spitting saliva on napkin , unable to swallow   CHEST/LUNG: Clear to auscultation bilaterally; No wheeze  HEART: Regular rate and rhythm; No murmurs, rubs, or gallops  ABDOMEN: Soft, Nontender, Nondistended; Bowel sounds present  EXTREMITIES:  2+ Peripheral Pulses, No clubbing, cyanosis, or edema  PSYCH: AAOx3  NEUROLOGY: non-focal  SKIN: No rashes or lesions

## 2018-06-30 NOTE — CONSULT NOTE ADULT - SUBJECTIVE AND OBJECTIVE BOX
JANINE SNOW [id number]  89y Female    HPI:  89 y o f with pmh  of COPD not on home O2 , hiatal hernia , OA , diverticulosis ,and PUD s/p UGIB in 2017 presented to ER with c/o left sided facial swelling , difficulty swallowing and muffled voice for 2 days prior to admission (06/27). [place] and was transferred to [place] for ENT evaluation as Ct neck showed prelim findings of peritonsillar abscess. Pt reports that she felt left sided facial swelling 3 days ago and noticed change in her voice quality. Pt also reports pain while swallowing to a point that pt is even unable to swallow her saliva. Pt denies any fever , chills , nausea , vomiting , cough or sob . Pt denies any recent dental interventions, any trauma to face. In ER pt was found to have left sided facial swelling , Ct neck findings were consistent with swelling and siloadenitis, Pt was seen by ENT Pa and laryngoscope showed diffuse swelling of left side of mouth , swelling of epiglottis with secretions. Pt is saturating > 90% on room air , but will need to upgraded to ICU for impending airway compromise. Patient was scheduled for intubation in OR/controlled setting and surgery consult was placed incase emergent tracheostomy was needed.      PAST MEDICAL & SURGICAL HISTORY:  Osteoarthritis  Diverticulosis  Hiatal hernia  PUD (peptic ulcer disease)  Chronic obstructive pulmonary disease  S/P laparoscopic-assisted sigmoidectomy        MEDICATIONS  (STANDING):  ampicillin/sulbactam  IVPB 3 Gram(s) IV Intermittent every 6 hours  clindamycin IVPB 600 milliGRAM(s) IV Intermittent every 8 hours  dexamethasone  Injectable 4 milliGRAM(s) IV Push every 4 hours  dextrose 5% + sodium chloride 0.9%. 1000 milliLiter(s) (75 mL/Hr) IV Continuous <Continuous>  docusate sodium 100 milliGRAM(s) Oral three times a day  fentaNYL   Infusion. 0.5 MICROgram(s)/kG/Hr (3.05 mL/Hr) IV Continuous <Continuous>  heparin  Injectable 5000 Unit(s) SubCutaneous every 8 hours  lactated ringers. 1000 milliLiter(s) (50 mL/Hr) IV Continuous <Continuous>  propofol Infusion 30 MICROgram(s)/kG/Min (10.98 mL/Hr) IV Continuous <Continuous>    MEDICATIONS  (PRN):  aluminum hydroxide/magnesium hydroxide/simethicone Suspension 30 milliLiter(s) Oral every 4 hours PRN Dyspepsia  HYDROmorphone  Injectable 1 milliGRAM(s) IV Push every 10 minutes PRN Moderate Pain  ibuprofen  Tablet 400 milliGRAM(s) Oral every 6 hours PRN Mild pain  morphine  - Injectable 4 milliGRAM(s) IV Push every 10 minutes PRN Severe Pain  ondansetron Injectable 4 milliGRAM(s) IV Push every 6 hours PRN Nausea  senna 2 Tablet(s) Oral at bedtime PRN Constipation      Allergies    No Known Allergies    Intolerances        REVIEW OF SYSTEMS    [X ] A ten-point review of systems was otherwise negative except as noted.        Vital Signs Last 24 Hrs  T(C): 37.1 (30 Jun 2018 14:35), Max: 37.1 (30 Jun 2018 14:35)  T(F): 98.8 (30 Jun 2018 14:35), Max: 98.8 (30 Jun 2018 14:35)  HR: 128 (30 Jun 2018 15:30) (79 - 133)  BP: 157/93 (30 Jun 2018 15:30) (96/59 - 217/110)  BP(mean): 115 (30 Jun 2018 15:30) (72 - 145)  RR: 17 (30 Jun 2018 15:30) (11 - 24)  SpO2: 98% (30 Jun 2018 15:30) (94% - 98%)    PHYSICAL EXAM:  GENERAL: Pror to intubation patient hjad left sided facial swelling and difficulty with phonation  CHEST/LUNG: Clear to auscultation bilaterally with obscureing from upper airway compromise  HEART: Regular rate and rhythm  ABDOMEN: Soft, Nontender, Nondistended;   EXTREMITIES:  No clubbing, cyanosis, or edema      LABS:  Labs:  CAPILLARY BLOOD GLUCOSE                              13.2   13.28 )-----------( 247      ( 29 Jun 2018 18:04 )             40.4       Auto Immature Granulocyte %: 0.5 % (06-29-18 @ 18:04)  Auto Neutrophil %: 88.0 % (06-29-18 @ 18:04)    06-30    135  |  99  |  18  ----------------------------<  154<H>  3.9   |  22  |  0.8      Calcium, Total Serum: 9.0 mg/dL (06-30-18 @ 12:48)  Calcium, Total Serum: 9.0 mg/dL (06-29-18 @ 18:04)      LFTs:             7.9  | 1.0  | 30       ------------------[64      ( 29 Jun 2018 18:04 )  4.1  | x    | 7           Lipase:x      Amylase:x         Blood Gas Arterial, Lactate: 0.9 mmoL/L (06-30-18 @ 15:37)  Lactate, Blood: 1.2 mmol/L (06-29-18 @ 18:04)    ABG - ( 30 Jun 2018 15:37 )  pH: 7.38  /  pCO2: 39    /  pO2: 77    / HCO3: 23    / Base Excess: -1.8  /  SaO2: 95              RADIOLOGY & ADDITIONAL STUDIES:  < from: CT Neck Soft Tissue w/ IV Cont (06.29.18 @ 19:41) >  Left submandibular gland sialoadenitis with a left submandibular duct   stone. Extensive inflammatory changes within the left side of the neck,   left floor of mouth, and left pharynx.    < end of copied text >

## 2018-07-01 LAB
ANION GAP SERPL CALC-SCNC: 19 MMOL/L — HIGH (ref 7–14)
APTT BLD: 28.8 SEC — SIGNIFICANT CHANGE UP (ref 27–39.2)
BASOPHILS # BLD AUTO: 0 K/UL — SIGNIFICANT CHANGE UP (ref 0–0.2)
BASOPHILS NFR BLD AUTO: 0 % — SIGNIFICANT CHANGE UP (ref 0–1)
BUN SERPL-MCNC: 24 MG/DL — HIGH (ref 10–20)
CALCIUM SERPL-MCNC: 8.6 MG/DL — SIGNIFICANT CHANGE UP (ref 8.5–10.1)
CHLORIDE SERPL-SCNC: 104 MMOL/L — SIGNIFICANT CHANGE UP (ref 98–110)
CO2 SERPL-SCNC: 20 MMOL/L — SIGNIFICANT CHANGE UP (ref 17–32)
CREAT SERPL-MCNC: 0.9 MG/DL — SIGNIFICANT CHANGE UP (ref 0.7–1.5)
EOSINOPHIL # BLD AUTO: 0 K/UL — SIGNIFICANT CHANGE UP (ref 0–0.7)
EOSINOPHIL NFR BLD AUTO: 0 % — SIGNIFICANT CHANGE UP (ref 0–8)
GLUCOSE SERPL-MCNC: 182 MG/DL — HIGH (ref 70–99)
HCT VFR BLD CALC: 35.6 % — LOW (ref 37–47)
HGB BLD-MCNC: 11.5 G/DL — LOW (ref 12–16)
IMM GRANULOCYTES NFR BLD AUTO: 0.7 % — HIGH (ref 0.1–0.3)
INR BLD: 1.23 RATIO — SIGNIFICANT CHANGE UP (ref 0.65–1.3)
LYMPHOCYTES # BLD AUTO: 0.31 K/UL — LOW (ref 1.2–3.4)
LYMPHOCYTES # BLD AUTO: 3.6 % — LOW (ref 20.5–51.1)
MCHC RBC-ENTMCNC: 26.4 PG — LOW (ref 27–31)
MCHC RBC-ENTMCNC: 32.3 G/DL — SIGNIFICANT CHANGE UP (ref 32–37)
MCV RBC AUTO: 81.8 FL — SIGNIFICANT CHANGE UP (ref 81–99)
MONOCYTES # BLD AUTO: 0.2 K/UL — SIGNIFICANT CHANGE UP (ref 0.1–0.6)
MONOCYTES NFR BLD AUTO: 2.3 % — SIGNIFICANT CHANGE UP (ref 1.7–9.3)
NEUTROPHILS # BLD AUTO: 8.1 K/UL — HIGH (ref 1.4–6.5)
NEUTROPHILS NFR BLD AUTO: 93.4 % — HIGH (ref 42.2–75.2)
PLATELET # BLD AUTO: 240 K/UL — SIGNIFICANT CHANGE UP (ref 130–400)
POTASSIUM SERPL-MCNC: 3.8 MMOL/L — SIGNIFICANT CHANGE UP (ref 3.5–5)
POTASSIUM SERPL-SCNC: 3.8 MMOL/L — SIGNIFICANT CHANGE UP (ref 3.5–5)
PROTHROM AB SERPL-ACNC: 13.3 SEC — HIGH (ref 9.95–12.87)
RBC # BLD: 4.35 M/UL — SIGNIFICANT CHANGE UP (ref 4.2–5.4)
RBC # FLD: 18.5 % — HIGH (ref 11.5–14.5)
SODIUM SERPL-SCNC: 143 MMOL/L — SIGNIFICANT CHANGE UP (ref 135–146)
WBC # BLD: 8.67 K/UL — SIGNIFICANT CHANGE UP (ref 4.8–10.8)
WBC # FLD AUTO: 8.67 K/UL — SIGNIFICANT CHANGE UP (ref 4.8–10.8)

## 2018-07-01 RX ORDER — SODIUM CHLORIDE 9 MG/ML
1000 INJECTION INTRAMUSCULAR; INTRAVENOUS; SUBCUTANEOUS
Refills: 0 | Status: DISCONTINUED | OUTPATIENT
Start: 2018-07-01 | End: 2018-07-05

## 2018-07-01 RX ADMIN — PROPOFOL 10.98 MICROGRAM(S)/KG/MIN: 10 INJECTION, EMULSION INTRAVENOUS at 00:30

## 2018-07-01 RX ADMIN — Medication 4 MILLIGRAM(S): at 01:45

## 2018-07-01 RX ADMIN — AMPICILLIN SODIUM AND SULBACTAM SODIUM 200 GRAM(S): 250; 125 INJECTION, POWDER, FOR SUSPENSION INTRAMUSCULAR; INTRAVENOUS at 12:06

## 2018-07-01 RX ADMIN — HEPARIN SODIUM 5000 UNIT(S): 5000 INJECTION INTRAVENOUS; SUBCUTANEOUS at 14:46

## 2018-07-01 RX ADMIN — PROPOFOL 10.98 MICROGRAM(S)/KG/MIN: 10 INJECTION, EMULSION INTRAVENOUS at 18:31

## 2018-07-01 RX ADMIN — PROPOFOL 10.98 MICROGRAM(S)/KG/MIN: 10 INJECTION, EMULSION INTRAVENOUS at 19:30

## 2018-07-01 RX ADMIN — AMPICILLIN SODIUM AND SULBACTAM SODIUM 200 GRAM(S): 250; 125 INJECTION, POWDER, FOR SUSPENSION INTRAMUSCULAR; INTRAVENOUS at 18:04

## 2018-07-01 RX ADMIN — Medication 100 MILLIGRAM(S): at 21:33

## 2018-07-01 RX ADMIN — SODIUM CHLORIDE 75 MILLILITER(S): 9 INJECTION INTRAMUSCULAR; INTRAVENOUS; SUBCUTANEOUS at 18:32

## 2018-07-01 RX ADMIN — Medication 4 MILLIGRAM(S): at 11:50

## 2018-07-01 RX ADMIN — HEPARIN SODIUM 5000 UNIT(S): 5000 INJECTION INTRAVENOUS; SUBCUTANEOUS at 22:25

## 2018-07-01 RX ADMIN — AMPICILLIN SODIUM AND SULBACTAM SODIUM 200 GRAM(S): 250; 125 INJECTION, POWDER, FOR SUSPENSION INTRAMUSCULAR; INTRAVENOUS at 00:53

## 2018-07-01 RX ADMIN — Medication 4 MILLIGRAM(S): at 18:01

## 2018-07-01 RX ADMIN — AMPICILLIN SODIUM AND SULBACTAM SODIUM 200 GRAM(S): 250; 125 INJECTION, POWDER, FOR SUSPENSION INTRAMUSCULAR; INTRAVENOUS at 23:27

## 2018-07-01 RX ADMIN — SODIUM CHLORIDE 75 MILLILITER(S): 9 INJECTION, SOLUTION INTRAVENOUS at 00:30

## 2018-07-01 RX ADMIN — Medication 4 MILLIGRAM(S): at 06:09

## 2018-07-01 RX ADMIN — HEPARIN SODIUM 5000 UNIT(S): 5000 INJECTION INTRAVENOUS; SUBCUTANEOUS at 06:09

## 2018-07-01 RX ADMIN — Medication 100 MILLIGRAM(S): at 05:09

## 2018-07-01 RX ADMIN — FENTANYL CITRATE 3.05 MICROGRAM(S)/KG/HR: 50 INJECTION INTRAVENOUS at 00:30

## 2018-07-01 RX ADMIN — SODIUM CHLORIDE 75 MILLILITER(S): 9 INJECTION INTRAMUSCULAR; INTRAVENOUS; SUBCUTANEOUS at 01:30

## 2018-07-01 RX ADMIN — Medication 4 MILLIGRAM(S): at 21:33

## 2018-07-01 RX ADMIN — Medication 4 MILLIGRAM(S): at 13:54

## 2018-07-01 RX ADMIN — Medication 100 MILLIGRAM(S): at 13:53

## 2018-07-01 RX ADMIN — Medication 100 MILLIGRAM(S): at 13:54

## 2018-07-01 RX ADMIN — FENTANYL CITRATE 3.05 MICROGRAM(S)/KG/HR: 50 INJECTION INTRAVENOUS at 19:30

## 2018-07-01 RX ADMIN — AMPICILLIN SODIUM AND SULBACTAM SODIUM 200 GRAM(S): 250; 125 INJECTION, POWDER, FOR SUSPENSION INTRAMUSCULAR; INTRAVENOUS at 05:09

## 2018-07-01 RX ADMIN — SODIUM CHLORIDE 75 MILLILITER(S): 9 INJECTION INTRAMUSCULAR; INTRAVENOUS; SUBCUTANEOUS at 19:30

## 2018-07-01 NOTE — PROGRESS NOTE ADULT - ASSESSMENT
IMPRESSION:  Left sialadenitis which on PE is better.  Left peritonsillarly abscess.  Doubt acute epiglottitis.  On vent    RECOMMENDATIONS:  Clindamycin 600 mg iv q8h.  Unasyn 3 gm iv q6h.  F/u with ENT.  D/C hand catheter.

## 2018-07-01 NOTE — DIETITIAN INITIAL EVALUATION ADULT. - FACTORS AFF FOOD INTAKE
Enteral nutrition support with Osmolite 1.0 was ordered for 60mL/hr but is pending infusion.  Per RN, the patient did not have a bowel movement at this time.

## 2018-07-01 NOTE — PROGRESS NOTE ADULT - SUBJECTIVE AND OBJECTIVE BOX
Over Night Events: s/p intubation        ROS:  See HPI    PHYSICAL EXAM    ICU Vital Signs Last 24 Hrs  T(C): 36 (01 Jul 2018 04:00), Max: 37.1 (30 Jun 2018 14:35)  T(F): 96.8 (01 Jul 2018 04:00), Max: 98.8 (30 Jun 2018 14:35)  HR: 62 (01 Jul 2018 06:00) (62 - 133)  BP: 103/54 (01 Jul 2018 06:00) (93/46 - 191/109)  BP(mean): 75 (01 Jul 2018 06:00) (65 - 145)  ABP: --  ABP(mean): --  RR: 12 (01 Jul 2018 06:00) (11 - 26)  SpO2: 98% (01 Jul 2018 06:00) (94% - 99%)        06-30-18 @ 07:01  -  07-01-18 @ 07:00  --------------------------------------------------------  IN: 1500.2 mL / OUT: 865 mL / NET: 635.2 mL        General:  HEENT:                Lymph Nodes: NO cervical LN   Lungs: Bilateral BS  Cardiovascular: Regular   Abdomen: Soft, Positive BS  Extremities: No clubbing   Skin:   Neurological:       LABS:                          11.5   8.67  )-----------( 240      ( 01 Jul 2018 04:47 )             35.6                                               07-01    143  |  104  |  24<H>  ----------------------------<  182<H>  3.8   |  20  |  0.9    Ca    8.6      01 Jul 2018 04:47    TPro  7.9  /  Alb  4.1  /  TBili  1.0  /  DBili  x   /  AST  30  /  ALT  7   /  AlkPhos  64  06-29      PT/INR - ( 01 Jul 2018 04:47 )   PT: 13.30 sec;   INR: 1.23 ratio         PTT - ( 01 Jul 2018 04:47 )  PTT:28.8 sec                                           CARDIAC MARKERS ( 30 Jun 2018 19:13 )  x     / <0.01 ng/mL / 31 U/L / x     / 1.1 ng/mL                                            LIVER FUNCTIONS - ( 29 Jun 2018 18:04 )  Alb: 4.1 g/dL / Pro: 7.9 g/dL / ALK PHOS: 64 U/L / ALT: 7 U/L / AST: 30 U/L / GGT: x                                                                                               Mode: AC/ CMV (Assist Control/ Continuous Mandatory Ventilation)  RR (machine): 10  TV (machine): 400  FiO2: 40  PEEP: 5  ITime: 1  MAP: 10  PIP: 26                                      ABG - ( 01 Jul 2018 07:18 )  pH, Arterial: 7.38  pH, Blood: x     /  pCO2: 40    /  pO2: 95    / HCO3: 24    / Base Excess: -1.0  /  SaO2: 98                  MEDICATIONS  (STANDING):  ampicillin/sulbactam  IVPB 3 Gram(s) IV Intermittent every 6 hours  clindamycin IVPB 600 milliGRAM(s) IV Intermittent every 8 hours  dexamethasone  Injectable 4 milliGRAM(s) IV Push every 4 hours  docusate sodium 100 milliGRAM(s) Oral three times a day  fentaNYL   Infusion. 0.5 MICROgram(s)/kG/Hr (3.05 mL/Hr) IV Continuous <Continuous>  heparin  Injectable 5000 Unit(s) SubCutaneous every 8 hours  propofol Infusion 30 MICROgram(s)/kG/Min (10.98 mL/Hr) IV Continuous <Continuous>  sodium chloride 0.9%. 1000 milliLiter(s) (75 mL/Hr) IV Continuous <Continuous>    MEDICATIONS  (PRN):  aluminum hydroxide/magnesium hydroxide/simethicone Suspension 30 milliLiter(s) Oral every 4 hours PRN Dyspepsia  ibuprofen  Tablet 400 milliGRAM(s) Oral every 6 hours PRN Mild pain  ondansetron Injectable 4 milliGRAM(s) IV Push every 6 hours PRN Nausea  senna 2 Tablet(s) Oral at bedtime PRN Constipation      Xrays:    et ok  no [id number]                                                                                 ECHO

## 2018-07-01 NOTE — PROGRESS NOTE ADULT - SUBJECTIVE AND OBJECTIVE BOX
JANINE SNOW  89y, Female      OVERNIGHT EVENTS:  S/P elective intubation is responsive, no pressors, no central lines.      VITALS:  T(F): 97.4, Max: 98.8 (06-30-18 @ 14:35)  HR: 86  BP: 124/81  RR: 25Vital Signs Last 24 Hrs  T(C): 36.3 (01 Jul 2018 00:01), Max: 37.1 (30 Jun 2018 14:35)  T(F): 97.4 (01 Jul 2018 00:01), Max: 98.8 (30 Jun 2018 14:35)  HR: 86 (01 Jul 2018 04:00) (79 - 133)  BP: 124/81 (01 Jul 2018 04:00) (93/46 - 191/109)  BP(mean): 107 (01 Jul 2018 04:00) (65 - 145)  RR: 25 (01 Jul 2018 04:00) (11 - 26)  SpO2: 99% (01 Jul 2018 04:00) (94% - 99%)    TESTS & MEASUREMENTS:                        11.5   8.67  )-----------( 240      ( 01 Jul 2018 04:47 )             35.6     06-30    135  |  99  |  18  ----------------------------<  154<H>  3.9   |  22  |  0.8    Ca    9.0      30 Jun 2018 12:48    TPro  7.9  /  Alb  4.1  /  TBili  1.0  /  DBili  x   /  AST  30  /  ALT  7   /  AlkPhos  64  06-29    LIVER FUNCTIONS - ( 29 Jun 2018 18:04 )  Alb: 4.1 g/dL / Pro: 7.9 g/dL / ALK PHOS: 64 U/L / ALT: 7 U/L / AST: 30 U/L / GGT: x                   RADIOLOGY & ADDITIONAL TESTS:    ANTIBIOTICS:  ampicillin/sulbactam  IVPB 3 Gram(s) IV Intermittent every 6 hours  clindamycin IVPB 600 milliGRAM(s) IV Intermittent every 8 hours

## 2018-07-01 NOTE — PROGRESS NOTE ADULT - ASSESSMENT
89 yo female with airway swelling due to inflammatory changes caused by acute sialoadenitis with calculus in the duct, s/p prophylactic intubation    no events noted over night    pt on clindamycin, unasyn and decadron    plan  -continue abx  -continue steroids  -will discuss with Dr. Campos when pt is candidate for extubation

## 2018-07-01 NOTE — DIETITIAN INITIAL EVALUATION ADULT. - ADHERENCE
Per family, the patient consumed junk food daily; did not consume full meals but picks at food./poor

## 2018-07-01 NOTE — DIETITIAN INITIAL EVALUATION ADULT. - PHYSICAL APPEARANCE
BMI-27; Based on nutrition focused physical examination, the patient appears well nourished with no physical signs of muscle and subcutaneous fat wasting./well nourished/overweight

## 2018-07-01 NOTE — PROGRESS NOTE ADULT - SUBJECTIVE AND OBJECTIVE BOX
89 yo female with sialolith causing sialoadenitis resulting in airway edema s/p elective intubation for airway protection 6/30  no events noted over night  pt seen and examined at bedside  pt currently on clinda/unasyn and decadron    Vital Signs Last 24 Hrs  T(C): 36.8 (01 Jul 2018 11:55), Max: 37.1 (30 Jun 2018 14:35)  T(F): 98.3 (01 Jul 2018 11:55), Max: 98.8 (30 Jun 2018 14:35)  HR: 74 (01 Jul 2018 11:58) (62 - 133)  BP: 117/55 (01 Jul 2018 11:55) (93/46 - 191/109)  BP(mean): 87 (01 Jul 2018 11:55) (65 - 145)  RR: 10 (01 Jul 2018 11:55) (10 - 26)  SpO2: 98% (01 Jul 2018 11:55) (94% - 100%)    nc/at  intubated, sedated, but easily roused  pt has cellulitis of neck, demarcated with surgical marker  left submandibular gland, indurated, swollen                          11.5   8.67  )-----------( 240      ( 01 Jul 2018 04:47 )             35.6

## 2018-07-01 NOTE — DIETITIAN INITIAL EVALUATION ADULT. - ENTERAL KCAL
NAME: Jake Duran        :  1974        MRN:  [id number]        Assessment :    Plan:  --LITTLE   Rhabdo  Obesity  HTN  Sepsis --HD #1 4/15. HD TTS. Watch for renal recovery. Suspect that recovery will take TIME. Approved for outpatient HD at [facility].  HD in 18  Plan for permcathwhen WBC better. Likely need also HD in AM  LEUKOCYTOSIS     Pull fluid as tolerated. gi SEEING  RECOM PRBC TRANSFUSION TODAY           Subjective:   Resting;noticed leukocytosis    Objective:     VITALS:   Last 24hrs VS reviewed since prior progress note. Most recent are:  Visit Vitals    /85 (BP 1 Location: Left arm, BP Patient Position: At rest)    Pulse 99    Temp 98.1 °F (36.7 °C)    Resp 20    Ht 5' 9\" (1.753 m)    Wt 145.1 kg (319 lb 14.2 oz)    SpO2 91%    BMI 47.24 kg/m2       Intake/Output Summary (Last 24 hours) at 18 0830  Last data filed at 18 0040   Gross per 24 hour   Intake                0 ml   Output              500 ml   Net             -500 ml      Telemetry Reviewed:     PHYSICAL EXAM:  General: NAD   distant BS  c  No edema       ________________________________________________________________________  Yesy Hagan MD     Procedures: see electronic medical records for all procedures/Xrays and details which  were not copied into this note but were reviewed prior to creation of Plan.       LABS:  Recent Labs      18   0104  18   0349   WBC  25.6*  15.5*   HGB  6.3*  6.9*   HCT  19.2*  21.2*   PLT  345  260     Recent Labs      18   0900  18   0349   NA  138  139   K  3.8  3.8   CL  100  100   CO2  27  30   BUN  108*  82*   CREA  12.80*  9.95*   GLU  191*  181*   CA  8.6  8.5     Recent Labs      18   0349   SGOT  66*   AP  77   TP  5.9*   ALB  2.2*   GLOB  3.7     Recent Labs      18   0900  18   1305   INR  1.0  1.0   PTP  10.3  10.3   APTT  21.9* --       No results for input(s): FE, TIBC, PSAT, FERR in the last 72 hours. No results found for: FOL, RBCF   No results for input(s): PH, PCO2, PO2 in the last 72 hours.   Recent Labs      04/21/18   0900  04/20/18   0349   CPK  974*  1663*     No components found for: [place]  Lab Results   Component Value Date/Time    Color YELLOW/STRAW 04/12/2018 12:44 AM    Appearance CLEAR 04/12/2018 12:44 AM    Specific gravity <1.005 04/12/2018 12:44 AM    Specific gravity 1.021 04/16/2013 05:05 PM    pH (UA) 5.0 04/12/2018 12:44 AM    Protein NEGATIVE  04/12/2018 12:44 AM    Glucose >1000 (A) 04/12/2018 12:44 AM    Ketone TRACE (A) 04/12/2018 12:44 AM    Bilirubin NEGATIVE  04/12/2018 12:44 AM    Urobilinogen 0.2 04/12/2018 12:44 AM    Nitrites NEGATIVE  04/12/2018 12:44 AM    Leukocyte Esterase NEGATIVE  04/12/2018 12:44 AM    Epithelial cells FEW 04/12/2018 12:44 AM    Bacteria NEGATIVE  04/12/2018 12:44 AM    WBC 0-4 04/12/2018 12:44 AM    RBC 0-5 04/12/2018 12:44 AM       MEDICATIONS: 0

## 2018-07-01 NOTE — PROGRESS NOTE ADULT - ASSESSMENT
Patient is a 89 y/o F who presents with a chief complaint of Left sided facial swelling, difficulty swallowing and muffled voice for 2 days. Patient was found to have left submandibular gland sialoadenitis and intubated for airway protection.     # Left-sided sialoadenitis with extensive left sided submandibular swelling and airway compromise.  - CT neck w/ contrast (6/29/18): Left submandibular gland sialoadenitis with a left submandibular duct stone. Extensive inflammatory changes within the left side of the neck, left floor of mouth, and left pharynx.  - Pt was prophylactically intubated on 6/30/18. Sedated with fentanyl, versed  - will give pt iv unasyn and clindamycin.  - IV steroids .  - IV hydration  - F/U ENT recommendations for possible drainage.     # PUD :  -C/w IV protonix.    # COPD:  -Stable, not wheezing.    OG tube placed (confirm positioning with CXR)  # DVT ppx - with heparin   # dispo: - FULL CODE FROM HOME. Patient is a 89 y/o F who presents with a chief complaint of Left sided facial swelling, difficulty swallowing and muffled voice for 2 days. Patient was found to have left submandibular gland sialoadenitis and intubated for airway protection.     # Left-sided sialoadenitis with extensive left sided submandibular swelling and airway compromise.  - CT neck w/ contrast (6/29/18): Left submandibular gland sialoadenitis with a left submandibular duct stone. Extensive inflammatory changes within the left side of the neck, left floor of mouth, and left pharynx.  - Pt was prophylactically intubated on 6/30/18. Sedated with fentanyl, versed  - Continue IV unasyn and clindamycin.  - F/u ID consult  - IV steroids   - IV hydration  - F/U ENT recommendations for possible drainage.     # PUD   - C/w IV protonix.    # COPD - not on home oxygen.  - Stable, not wheezing.    OG tube placed (confirm positioning with CXR)  DVT ppx - with heparin   Dispo: - FULL CODE FROM HOME.

## 2018-07-01 NOTE — DIETITIAN INITIAL EVALUATION ADULT. - ENERGY NEEDS
Estimated Calorie Needs: PSE 2003B: [id number] (0.96) + 6.2 (31) + 36.7 (167) – 6212 = 1,013kcal/day   Estimated Protein Needs: 73-92grams/day (1.2-1.5grams/kg of admit weight) - based on intubation  Estimated Fluid Needs: Fluids as per ICU team

## 2018-07-01 NOTE — DIETITIAN INITIAL EVALUATION ADULT. - PERTINENT MEDS FT
0.9% NS at 75mL/hr, Senna, Decadron, Colace, Fentanyl, Zofran, Heparin, Unasyn, Cleocin, Propofol at 3mL/hr (79kcal/day)

## 2018-07-01 NOTE — PROGRESS NOTE ADULT - SUBJECTIVE AND OBJECTIVE BOX
Patient is a 89y old Female who presents with a chief complaint of Left sided facial swelling , difficulty swallowing and muffled voice for 2 days (30 Jun 2018 09:06). Pt presented to ER [facility] initially and was transferred to [place] for ENT evaluation as CT neck showed prelim findings of peritonsillar abscess. Pt was seen by ENT Pa and laryngoscope showed diffuse swelling of left side of mouth, swelling of epiglottis with secretions, and patient was intubated for impending airway compromise.     Patient was seen and examined at bedside. Intubated, and sedated with fentanyl, versed.     INTERVAL HPI/OVERNIGHT EVENTS:  ICU Vital Signs Last 24 Hrs  T(C): 36.4 (30 Jun 2018 23:00), Max: 37.1 (30 Jun 2018 14:35)  T(F): 97.6 (30 Jun 2018 23:00), Max: 98.8 (30 Jun 2018 14:35)  HR: 90 (30 Jun 2018 23:00) (79 - 133)  BP: 97/49 (30 Jun 2018 23:00) (96/59 - 191/109)  BP(mean): 71 (30 Jun 2018 23:00) (68 - 145)  ABP: --  ABP(mean): --  RR: 16 (30 Jun 2018 23:00) (11 - 26)  SpO2: 98% (30 Jun 2018 23:00) (94% - 99%)    I&O's Summary    30 Jun 2018 07:01  -  01 Jul 2018 01:48  --------------------------------------------------------  IN: 826 mL / OUT: 650 mL / NET: 176 mL      Mode: AC/ CMV (Assist Control/ Continuous Mandatory Ventilation)  RR (machine): 10  TV (machine): 400  FiO2: 40  PEEP: 5  ITime: 1  MAP: 11  PIP: 26      LABS:                        13.2   13.28 )-----------( 247      ( 29 Jun 2018 18:04 )             40.4     06-30    135  |  99  |  18  ----------------------------<  154<H>  3.9   |  22  |  0.8    Ca    9.0      30 Jun 2018 12:48    TPro  7.9  /  Alb  4.1  /  TBili  1.0  /  DBili  x   /  AST  30  /  ALT  7   /  AlkPhos  64  06-29        CAPILLARY BLOOD GLUCOSE        ABG - ( 30 Jun 2018 15:37 )  pH, Arterial: 7.38  pH, Blood: x     /  pCO2: 39    /  pO2: 77    / HCO3: 23    / Base Excess: -1.8  /  SaO2: 95                  RADIOLOGY & ADDITIONAL TESTS:    Consultant(s) Notes Reviewed:  [x ] YES  [ ] NO    MEDICATIONS  (STANDING):  ampicillin/sulbactam  IVPB 3 Gram(s) IV Intermittent every 6 hours  clindamycin IVPB 600 milliGRAM(s) IV Intermittent every 8 hours  dexamethasone  Injectable 4 milliGRAM(s) IV Push every 4 hours  docusate sodium 100 milliGRAM(s) Oral three times a day  fentaNYL   Infusion. 0.5 MICROgram(s)/kG/Hr (3.05 mL/Hr) IV Continuous <Continuous>  heparin  Injectable 5000 Unit(s) SubCutaneous every 8 hours  propofol Infusion 30 MICROgram(s)/kG/Min (10.98 mL/Hr) IV Continuous <Continuous>  sodium chloride 0.9%. 1000 milliLiter(s) (75 mL/Hr) IV Continuous <Continuous>    MEDICATIONS  (PRN):  aluminum hydroxide/magnesium hydroxide/simethicone Suspension 30 milliLiter(s) Oral every 4 hours PRN Dyspepsia  ibuprofen  Tablet 400 milliGRAM(s) Oral every 6 hours PRN Mild pain  ondansetron Injectable 4 milliGRAM(s) IV Push every 6 hours PRN Nausea  senna 2 Tablet(s) Oral at bedtime PRN Constipation      PHYSICAL EXAM:  GENERAL: Intubated.   HEAD:  left submandibular swelling and erythema.   CHEST/LUNG: Clear to auscultation bilaterally; No wheeze, crackles, or rales.  HEART: Regular rate and rhythm; No murmurs, rubs, or gallops  ABDOMEN: Soft, Nontender, Nondistended; Bowel sounds present  EXTREMITIES:  2+ Peripheral Pulses, No clubbing, cyanosis, or edema  SKIN: No rashes or lesions    Care Discussed with Consultants/Other Providers [ x] YES  [ ] NO Patient is a 89 y/o F who presents with a chief complaint of Left sided facial swelling, difficulty swallowing and muffled voice for 2 days (30 Jun 2018 09:06). Initially, Pt presented to ER [facility] and was transferred to [place] for ENT evaluation as CT neck showed prelim findings of peritonsillar abscess. Pt was seen by ENT and laryngoscope showed diffuse swelling of left side of mouth, swelling of epiglottis with secretions, and patient was intubated for impending airway compromise.     Patient was seen and examined at bedside. Intubated, and sedated with fentanyl, versed.     INTERVAL HPI/OVERNIGHT EVENTS:  ICU Vital Signs Last 24 Hrs  T(C): 36.4 (30 Jun 2018 23:00), Max: 37.1 (30 Jun 2018 14:35)  T(F): 97.6 (30 Jun 2018 23:00), Max: 98.8 (30 Jun 2018 14:35)  HR: 90 (30 Jun 2018 23:00) (79 - 133)  BP: 97/49 (30 Jun 2018 23:00) (96/59 - 191/109)  BP(mean): 71 (30 Jun 2018 23:00) (68 - 145)  ABP: --  ABP(mean): --  RR: 16 (30 Jun 2018 23:00) (11 - 26)  SpO2: 98% (30 Jun 2018 23:00) (94% - 99%)    I&O's Summary    30 Jun 2018 07:01  -  01 Jul 2018 01:48  --------------------------------------------------------  IN: 826 mL / OUT: 650 mL / NET: 176 mL      Mode: AC/ CMV (Assist Control/ Continuous Mandatory Ventilation)  RR (machine): 10  TV (machine): 400  FiO2: 40  PEEP: 5  ITime: 1  MAP: 11  PIP: 26      LABS:                        13.2   13.28 )-----------( 247      ( 29 Jun 2018 18:04 )             40.4     06-30    135  |  99  |  18  ----------------------------<  154<H>  3.9   |  22  |  0.8    Ca    9.0      30 Jun 2018 12:48    TPro  7.9  /  Alb  4.1  /  TBili  1.0  /  DBili  x   /  AST  30  /  ALT  7   /  AlkPhos  64  06-29        CAPILLARY BLOOD GLUCOSE        ABG - ( 30 Jun 2018 15:37 )  pH, Arterial: 7.38  pH, Blood: x     /  pCO2: 39    /  pO2: 77    / HCO3: 23    / Base Excess: -1.8  /  SaO2: 95                  RADIOLOGY & ADDITIONAL TESTS:    Consultant(s) Notes Reviewed:  [x ] YES  [ ] NO    MEDICATIONS  (STANDING):  ampicillin/sulbactam  IVPB 3 Gram(s) IV Intermittent every 6 hours  clindamycin IVPB 600 milliGRAM(s) IV Intermittent every 8 hours  dexamethasone  Injectable 4 milliGRAM(s) IV Push every 4 hours  docusate sodium 100 milliGRAM(s) Oral three times a day  fentaNYL   Infusion. 0.5 MICROgram(s)/kG/Hr (3.05 mL/Hr) IV Continuous <Continuous>  heparin  Injectable 5000 Unit(s) SubCutaneous every 8 hours  propofol Infusion 30 MICROgram(s)/kG/Min (10.98 mL/Hr) IV Continuous <Continuous>  sodium chloride 0.9%. 1000 milliLiter(s) (75 mL/Hr) IV Continuous <Continuous>    MEDICATIONS  (PRN):  aluminum hydroxide/magnesium hydroxide/simethicone Suspension 30 milliLiter(s) Oral every 4 hours PRN Dyspepsia  ibuprofen  Tablet 400 milliGRAM(s) Oral every 6 hours PRN Mild pain  ondansetron Injectable 4 milliGRAM(s) IV Push every 6 hours PRN Nausea  senna 2 Tablet(s) Oral at bedtime PRN Constipation      PHYSICAL EXAM:  GENERAL: Intubated.   HEAD:  left submandibular swelling and erythema.   CHEST/LUNG: Clear to auscultation bilaterally; No wheeze, crackles, or rales.  HEART: Regular rate and rhythm; No murmurs, rubs, or gallops  ABDOMEN: Soft, Nontender, Nondistended; Bowel sounds present  EXTREMITIES:  2+ Peripheral Pulses, No clubbing, cyanosis, or edema  SKIN: No rashes or lesions    Care Discussed with Consultants/Other Providers [ x] YES  [ ] NO

## 2018-07-01 NOTE — PROGRESS NOTE ADULT - SUBJECTIVE AND OBJECTIVE BOX
Patient was seen and examined in CCU. Spoke with RN. Chart reviewed.  On vent via ETT  Vital Signs Last 24 Hrs  T(F): 96.8 (01 Jul 2018 04:00), Max: 98.8 (30 Jun 2018 14:35)  HR: 69 (01 Jul 2018 07:29) (62 - 133)  BP: 103/54 (01 Jul 2018 06:00) (93/46 - 191/109)  SpO2: 100% (01 Jul 2018 07:29) (94% - 100%)  MEDICATIONS  (STANDING):  ampicillin/sulbactam  IVPB 3 Gram(s) IV Intermittent every 6 hours  clindamycin IVPB 600 milliGRAM(s) IV Intermittent every 8 hours  dexamethasone  Injectable 4 milliGRAM(s) IV Push every 4 hours  docusate sodium 100 milliGRAM(s) Oral three times a day  fentaNYL   Infusion. 0.5 MICROgram(s)/kG/Hr (3.05 mL/Hr) IV Continuous <Continuous>  heparin  Injectable 5000 Unit(s) SubCutaneous every 8 hours  propofol Infusion 30 MICROgram(s)/kG/Min (10.98 mL/Hr) IV Continuous <Continuous>  sodium chloride 0.9%. 1000 milliLiter(s) (75 mL/Hr) IV Continuous <Continuous>    MEDICATIONS  (PRN):  aluminum hydroxide/magnesium hydroxide/simethicone Suspension 30 milliLiter(s) Oral every 4 hours PRN Dyspepsia  ibuprofen  Tablet 400 milliGRAM(s) Oral every 6 hours PRN Mild pain  ondansetron Injectable 4 milliGRAM(s) IV Push every 6 hours PRN Nausea  senna 2 Tablet(s) Oral at bedtime PRN Constipation    Labs:                        11.5   8.67  )-----------( 240      ( 01 Jul 2018 04:47 )             35.6                         13.2   13.28 )-----------( 247      ( 29 Jun 2018 18:04 )             40.4     01 Jul 2018 04:47    143    |  104    |  24     ----------------------------<  182    3.8     |  20     |  0.9    30 Jun 2018 12:48    135    |  99     |  18     ----------------------------<  154    3.9     |  22     |  0.8      Ca    8.6        01 Jul 2018 04:47  Ca    9.0        30 Jun 2018 12:48    TPro  7.9    /  Alb  4.1    /  TBili  1.0    /  DBili  x      /  AST  30     /  ALT  7      /  AlkPhos  64     29 Jun 2018 18:04    PT/INR - ( 01 Jul 2018 04:47 )   PT: 13.30 sec;   INR: 1.23 ratio         PTT - ( 01 Jul 2018 04:47 )  PTT:28.8 sec      On vent via ETT  Head:  Normocephalic, atraumatic  ENT:  Mucosa moist, no ulcerations  Neck:  Supple, no JVD,   Skin: no breakdowns (as per RN)  Resp: CTA B/L  CV: RRR, S1S2,   GI: Soft, NT, bowel sounds  MS: No edema, + peripheral pulses,hand      A/P:  89 yo woman with Left sialadenitis, Left peritonsillarly abscess s/p elective intubation  On vent    RECOMMENDATIONS:  Clindamycin 600 mg iv q8h.  Unasyn 3 gm iv q6h.  F/u with ENT, ID and pulm    weaning assessement    Plan as per CCU team  DVT prophylaxis  Decubitus prevention- all measures as per RN protocol  Please call or text me with any questions or updates

## 2018-07-01 NOTE — DIETITIAN INITIAL EVALUATION ADULT. - OTHER INFO
89y/o female with pmhx listed below presented with c/o left sided facial swelling and muffled voice. Found to have sialolith causing sialoadenitis resulting in airway edema. S/p elective intubation for airway protection (6/30). MAP-112. Skin is intact (Lewis Score-12).

## 2018-07-01 NOTE — DIETITIAN INITIAL EVALUATION ADULT. - NS AS NUTRI INTERV ENTERAL NUTRITION
1.Recommend change TF formula to Vital HP at 10mL, increase 10mL Q4hrs to goal rate of 40mL/hr (960kcal, 84gm pro, 806mL free H2O) + 79kcal from propofol/Composition/Concentration/Rate/Volume

## 2018-07-02 LAB
ANION GAP SERPL CALC-SCNC: 16 MMOL/L — HIGH (ref 7–14)
APTT BLD: 25.8 SEC — LOW (ref 27–39.2)
BASE EXCESS BLDA CALC-SCNC: -1.8 MMOL/L — SIGNIFICANT CHANGE UP (ref -2–2)
BUN SERPL-MCNC: 41 MG/DL — HIGH (ref 10–20)
CALCIUM SERPL-MCNC: 8.4 MG/DL — LOW (ref 8.5–10.1)
CHLORIDE SERPL-SCNC: 105 MMOL/L — SIGNIFICANT CHANGE UP (ref 98–110)
CO2 SERPL-SCNC: 22 MMOL/L — SIGNIFICANT CHANGE UP (ref 17–32)
CREAT SERPL-MCNC: 1 MG/DL — SIGNIFICANT CHANGE UP (ref 0.7–1.5)
GLUCOSE SERPL-MCNC: 165 MG/DL — HIGH (ref 70–99)
HCO3 BLDA-SCNC: 24 MMOL/L — SIGNIFICANT CHANGE UP (ref 23–27)
HCT VFR BLD CALC: 33.2 % — LOW (ref 37–47)
HGB BLD-MCNC: 10.3 G/DL — LOW (ref 12–16)
HOROWITZ INDEX BLDA+IHG-RTO: 40 — SIGNIFICANT CHANGE UP
INR BLD: 1.07 RATIO — SIGNIFICANT CHANGE UP (ref 0.65–1.3)
MAGNESIUM SERPL-MCNC: 2.2 MG/DL — SIGNIFICANT CHANGE UP (ref 1.8–2.4)
MCHC RBC-ENTMCNC: 26.2 PG — LOW (ref 27–31)
MCHC RBC-ENTMCNC: 31 G/DL — LOW (ref 32–37)
MCV RBC AUTO: 84.5 FL — SIGNIFICANT CHANGE UP (ref 81–99)
NRBC # BLD: 0 /100 WBCS — SIGNIFICANT CHANGE UP (ref 0–0)
PCO2 BLDA: 44 MMHG — HIGH (ref 38–42)
PH BLDA: 7.34 — LOW (ref 7.38–7.42)
PHOSPHATE SERPL-MCNC: 4.9 MG/DL — SIGNIFICANT CHANGE UP (ref 2.1–4.9)
PLATELET # BLD AUTO: 235 K/UL — SIGNIFICANT CHANGE UP (ref 130–400)
PO2 BLDA: 102 MMHG — HIGH (ref 78–95)
POTASSIUM SERPL-MCNC: 4.2 MMOL/L — SIGNIFICANT CHANGE UP (ref 3.5–5)
POTASSIUM SERPL-SCNC: 4.2 MMOL/L — SIGNIFICANT CHANGE UP (ref 3.5–5)
PROTHROM AB SERPL-ACNC: 11.5 SEC — SIGNIFICANT CHANGE UP (ref 9.95–12.87)
RBC # BLD: 3.93 M/UL — LOW (ref 4.2–5.4)
RBC # FLD: 18.9 % — HIGH (ref 11.5–14.5)
SAO2 % BLDA: 98 % — SIGNIFICANT CHANGE UP (ref 94–98)
SODIUM SERPL-SCNC: 143 MMOL/L — SIGNIFICANT CHANGE UP (ref 135–146)
WBC # BLD: 8.61 K/UL — SIGNIFICANT CHANGE UP (ref 4.8–10.8)
WBC # FLD AUTO: 8.61 K/UL — SIGNIFICANT CHANGE UP (ref 4.8–10.8)

## 2018-07-02 PROCEDURE — 99222 1ST HOSP IP/OBS MODERATE 55: CPT

## 2018-07-02 RX ORDER — PANTOPRAZOLE SODIUM 20 MG/1
40 TABLET, DELAYED RELEASE ORAL
Refills: 0 | Status: DISCONTINUED | OUTPATIENT
Start: 2018-07-02 | End: 2018-07-09

## 2018-07-02 RX ORDER — ENOXAPARIN SODIUM 100 MG/ML
40 INJECTION SUBCUTANEOUS EVERY 24 HOURS
Refills: 0 | Status: DISCONTINUED | OUTPATIENT
Start: 2018-07-02 | End: 2018-07-09

## 2018-07-02 RX ADMIN — AMPICILLIN SODIUM AND SULBACTAM SODIUM 200 GRAM(S): 250; 125 INJECTION, POWDER, FOR SUSPENSION INTRAMUSCULAR; INTRAVENOUS at 12:27

## 2018-07-02 RX ADMIN — Medication 100 MILLIGRAM(S): at 21:12

## 2018-07-02 RX ADMIN — Medication 100 MILLIGRAM(S): at 06:18

## 2018-07-02 RX ADMIN — Medication 4 MILLIGRAM(S): at 21:12

## 2018-07-02 RX ADMIN — AMPICILLIN SODIUM AND SULBACTAM SODIUM 200 GRAM(S): 250; 125 INJECTION, POWDER, FOR SUSPENSION INTRAMUSCULAR; INTRAVENOUS at 06:18

## 2018-07-02 RX ADMIN — Medication 4 MILLIGRAM(S): at 04:00

## 2018-07-02 RX ADMIN — Medication 4 MILLIGRAM(S): at 18:10

## 2018-07-02 RX ADMIN — Medication 100 MILLIGRAM(S): at 14:11

## 2018-07-02 RX ADMIN — Medication 100 MILLIGRAM(S): at 06:17

## 2018-07-02 RX ADMIN — Medication 4 MILLIGRAM(S): at 09:50

## 2018-07-02 RX ADMIN — Medication 4 MILLIGRAM(S): at 14:11

## 2018-07-02 RX ADMIN — AMPICILLIN SODIUM AND SULBACTAM SODIUM 200 GRAM(S): 250; 125 INJECTION, POWDER, FOR SUSPENSION INTRAMUSCULAR; INTRAVENOUS at 23:36

## 2018-07-02 RX ADMIN — AMPICILLIN SODIUM AND SULBACTAM SODIUM 200 GRAM(S): 250; 125 INJECTION, POWDER, FOR SUSPENSION INTRAMUSCULAR; INTRAVENOUS at 18:21

## 2018-07-02 RX ADMIN — Medication 4 MILLIGRAM(S): at 06:16

## 2018-07-02 RX ADMIN — ENOXAPARIN SODIUM 40 MILLIGRAM(S): 100 INJECTION SUBCUTANEOUS at 12:27

## 2018-07-02 RX ADMIN — HEPARIN SODIUM 5000 UNIT(S): 5000 INJECTION INTRAVENOUS; SUBCUTANEOUS at 06:17

## 2018-07-02 NOTE — PROGRESS NOTE ADULT - ASSESSMENT
IMPRESSION:  Left sialadenitis which on PE is better.  Left peritonsillarly abscess.  Clinically improving    RECOMMENDATIONS:  Clindamycin 600 mg iv q8h.  Unasyn 3 gm iv q6h.  F/u with ENT.

## 2018-07-02 NOTE — ANESTHESIA FOLLOW-UP NOTE - NSEVALATIONFT_GEN_ALL_CORE
Pt with no anesthesia complications. Pt extubated by CCU team prior to my arrival and on facemask with no cardiopulmonary distress. Pt's VSS temp 97, hr 88, /75 Spo2 99% RR 12.

## 2018-07-02 NOTE — PROGRESS NOTE ADULT - SUBJECTIVE AND OBJECTIVE BOX
Over Night Events:  No overnight events, remained intubated and no pressors. 	      ROS:  See HPI    PHYSICAL EXAM    ICU Vital Signs Last 24 Hrs  T(C): 36.1 (02 Jul 2018 08:00), Max: 36.8 (01 Jul 2018 11:55)  T(F): 97 (02 Jul 2018 08:00), Max: 98.3 (01 Jul 2018 11:55)  HR: 58 (02 Jul 2018 08:09) (50 - 100)  BP: 140/71 (02 Jul 2018 08:00) (92/47 - 143/80)  BP(mean): 105 (02 Jul 2018 08:00) (64 - 112)  ABP: --  ABP(mean): --  RR: 12 (02 Jul 2018 08:00) (10 - 19)  SpO2: 99% (02 Jul 2018 08:09) (97% - 100%)      General: follows commands off sedation  HEENT: NOA             Lymph Nodes: No cervical LN   Lungs: Bilateral BS  Cardiovascular: Regular   Abdomen: Soft, Positive BS  Extremities: No clubbing   Skin: Warm  Neurological: Non focal       07-01-18 @ 07:01  -  07-02-18 @ 07:00  --------------------------------------------------------  IN:    fentaNYL Infusion.: 115 mL    Osmolite: 250 mL    propofol Infusion: 94 mL    sodium chloride 0.9%.: 1800 mL  Total IN: 2259 mL    OUT:    Indwelling Catheter - Urethral: 520 mL  Total OUT: 520 mL    Total NET: 1739 mL          LABS:                          10.3   8.61  )-----------( 235      ( 02 Jul 2018 04:38 )             33.2                                               07-02    143  |  105  |  41<H>  ----------------------------<  165<H>  4.2   |  22  |  1.0    Ca    8.4<L>      02 Jul 2018 04:38  Phos  4.9     07-02  Mg     2.2     07-02        PT/INR - ( 02 Jul 2018 04:38 )   PT: 11.50 sec;   INR: 1.07 ratio         PTT - ( 02 Jul 2018 04:38 )  PTT:25.8 sec                                           CARDIAC MARKERS ( 30 Jun 2018 19:13 )  x     / <0.01 ng/mL / 31 U/L / x     / 1.1 ng/mL                                                                                                                                  Mode: AC/ CMV (Assist Control/ Continuous Mandatory Ventilation)  RR (machine): 10  TV (machine): 400  FiO2: 40  PEEP: 5  ITime: 1  MAP: 11  PIP: 29                                      ABG - ( 02 Jul 2018 07:29 )  pH, Arterial: 7.34  pH, Blood: x     /  pCO2: 44    /  pO2: 102   / HCO3: 24    / Base Excess: -1.8  /  SaO2: 98                  MEDICATIONS  (STANDING):  ampicillin/sulbactam  IVPB 3 Gram(s) IV Intermittent every 6 hours  clindamycin IVPB 600 milliGRAM(s) IV Intermittent every 8 hours  dexamethasone  Injectable 4 milliGRAM(s) IV Push every 4 hours  docusate sodium 100 milliGRAM(s) Oral three times a day  fentaNYL   Infusion. 0.5 MICROgram(s)/kG/Hr (3.05 mL/Hr) IV Continuous <Continuous>  heparin  Injectable 5000 Unit(s) SubCutaneous every 8 hours  pantoprazole    Tablet 40 milliGRAM(s) Oral before breakfast  propofol Infusion 30 MICROgram(s)/kG/Min (10.98 mL/Hr) IV Continuous <Continuous>  sodium chloride 0.9%. 1000 milliLiter(s) (75 mL/Hr) IV Continuous <Continuous>    MEDICATIONS  (PRN):  aluminum hydroxide/magnesium hydroxide/simethicone Suspension 30 milliLiter(s) Oral every 4 hours PRN Dyspepsia  ibuprofen  Tablet 400 milliGRAM(s) Oral every 6 hours PRN Mild pain  ondansetron Injectable 4 milliGRAM(s) IV Push every 6 hours PRN Nausea  senna 2 Tablet(s) Oral at bedtime PRN Constipation      Xrays:                                   ET OK                                                  ECHO Over Night Events:    No overnight events, remained intubated and no pressors, good air leak	      ROS:  See HPI    PHYSICAL EXAM    ICU Vital Signs Last 24 Hrs  T(C): 36.1 (02 Jul 2018 08:00), Max: 36.8 (01 Jul 2018 11:55)  T(F): 97 (02 Jul 2018 08:00), Max: 98.3 (01 Jul 2018 11:55)  HR: 58 (02 Jul 2018 08:09) (50 - 100)  BP: 140/71 (02 Jul 2018 08:00) (92/47 - 143/80)  BP(mean): 105 (02 Jul 2018 08:00) (64 - 112)  ABP: --  ABP(mean): --  RR: 12 (02 Jul 2018 08:00) (10 - 19)  SpO2: 99% (02 Jul 2018 08:09) (97% - 100%)      General: follows commands off sedation/ improved swelling  HEENT: NOA             Lymph Nodes: No cervical LN   Lungs: dec bs both bases  Cardiovascular: Regular   Abdomen: Soft, Positive BS  Extremities: No clubbing   Skin: Warm  Neurological: Non focal       07-01-18 @ 07:01  -  07-02-18 @ 07:00  --------------------------------------------------------  IN:    fentaNYL Infusion.: 115 mL    Osmolite: 250 mL    propofol Infusion: 94 mL    sodium chloride 0.9%.: 1800 mL  Total IN: 2259 mL    OUT:    Indwelling Catheter - Urethral: 520 mL  Total OUT: 520 mL    Total NET: 1739 mL          LABS:                          10.3   8.61  )-----------( 235      ( 02 Jul 2018 04:38 )             33.2                                               07-02    143  |  105  |  41<H>  ----------------------------<  165<H>  4.2   |  22  |  1.0    Ca    8.4<L>      02 Jul 2018 04:38  Phos  4.9     07-02  Mg     2.2     07-02        PT/INR - ( 02 Jul 2018 04:38 )   PT: 11.50 sec;   INR: 1.07 ratio         PTT - ( 02 Jul 2018 04:38 )  PTT:25.8 sec                                           CARDIAC MARKERS ( 30 Jun 2018 19:13 )  x     / <0.01 ng/mL / 31 U/L / x     / 1.1 ng/mL                                                                                                                                  Mode: AC/ CMV (Assist Control/ Continuous Mandatory Ventilation)  RR (machine): 10  TV (machine): 400  FiO2: 40  PEEP: 5  ITime: 1  MAP: 11  PIP: 29                                      ABG - ( 02 Jul 2018 07:29 )  pH, Arterial: 7.34  pH, Blood: x     /  pCO2: 44    /  pO2: 102   / HCO3: 24    / Base Excess: -1.8  /  SaO2: 98                  MEDICATIONS  (STANDING):  ampicillin/sulbactam  IVPB 3 Gram(s) IV Intermittent every 6 hours  clindamycin IVPB 600 milliGRAM(s) IV Intermittent every 8 hours  dexamethasone  Injectable 4 milliGRAM(s) IV Push every 4 hours  docusate sodium 100 milliGRAM(s) Oral three times a day  fentaNYL   Infusion. 0.5 MICROgram(s)/kG/Hr (3.05 mL/Hr) IV Continuous <Continuous>  heparin  Injectable 5000 Unit(s) SubCutaneous every 8 hours  pantoprazole    Tablet 40 milliGRAM(s) Oral before breakfast  propofol Infusion 30 MICROgram(s)/kG/Min (10.98 mL/Hr) IV Continuous <Continuous>  sodium chloride 0.9%. 1000 milliLiter(s) (75 mL/Hr) IV Continuous <Continuous>    MEDICATIONS  (PRN):  aluminum hydroxide/magnesium hydroxide/simethicone Suspension 30 milliLiter(s) Oral every 4 hours PRN Dyspepsia  ibuprofen  Tablet 400 milliGRAM(s) Oral every 6 hours PRN Mild pain  ondansetron Injectable 4 milliGRAM(s) IV Push every 6 hours PRN Nausea  senna 2 Tablet(s) Oral at bedtime PRN Constipation      Xrays:  reviewed

## 2018-07-02 NOTE — PROGRESS NOTE ADULT - ASSESSMENT
89 y.o female w/ sialoadenitis 2* to sialolith - intubated    ·	extubate as per CCU  ·	cont IV abx  ·	cont decadron  ·	warm compress/gland massage  ·	w/d with attng, will follow

## 2018-07-02 NOTE — PROGRESS NOTE ADULT - SUBJECTIVE AND OBJECTIVE BOX
Pt is a 89 y.o female a/w sialoadenitis 2* sialolith, prophylactic intubation - seen and examined at bedside, doing well. Pt remains intubated, off fentanyl but remains on light dose propofol. Pt + air leak this AM with respiratory. PT denies any pain to neck. No overnight events.    MEDICATIONS (STANDING):  ampicillin/sulbactam  IVPB 3 Gram(s) IV Intermittent every 6 hours  clindamycin IVPB 600 milliGRAM(s) IV Intermittent every 8 hours  dexamethasone  Injectable 4 milliGRAM(s) IV Push every 4 hours  docusate sodium 100 milliGRAM(s) Oral three times a day  fentaNYL Infusion. 0.5 MICROgram(s)/kG/Hr (3.05 mL/Hr) IV Continuous  heparin  Injectable 5000 Unit(s) SubCutaneous every 8 hours  pantoprazole Tablet 40 milliGRAM(s) Oral before breakfast  propofol Infusion 30 MICROgram(s)/kG/Min (10.98 mL/Hr) IV Continuous  sodium chloride 0.9%. 1000 milliLiter(s) (75 mL/Hr) IV Continuous    Vital Signs: T(F): 97 (02 Jul 2018 08:00), Max: 98.3 (01 Jul 2018 11:55), HR: 58, BP: 140/71, RR: 12, SpO2: 99%  GEN: NAD, awake and alert  HEENT: + fullness noted to submental area, no erythema; + L submandibular gland firm, Oral cavity exam limited 2* to ETT. + edema noted to FOM.                          10.3   8.61  )-----------( 235      ( 02 Jul 2018 04:38 )             33.2

## 2018-07-02 NOTE — PROGRESS NOTE ADULT - SUBJECTIVE AND OBJECTIVE BOX
Patient is a 89 y/o F who presents with a chief complaint of Left sided facial swelling, difficulty swallowing and muffled voice for 2 days. Patient was found to have left submandibular gland sialoadenitis and intubated electively for airway protection. Now s/p intubation.     Patient was seen at bedside. Resting comfortable. Now extubated.       INTERVAL HPI/OVERNIGHT EVENTS:  ICU Vital Signs Last 24 Hrs  T(C): 36.1 (02 Jul 2018 08:00), Max: 36.8 (01 Jul 2018 11:55)  T(F): 97 (02 Jul 2018 08:00), Max: 98.3 (01 Jul 2018 11:55)  HR: 58 (02 Jul 2018 08:09) (50 - 84)  BP: 140/71 (02 Jul 2018 08:00) (92/47 - 143/80)  BP(mean): 105 (02 Jul 2018 08:00) (64 - 112)  ABP: --  ABP(mean): --  RR: 12 (02 Jul 2018 08:00) (10 - 19)  SpO2: 99% (02 Jul 2018 08:09) (97% - 100%)    I&O's Summary    01 Jul 2018 07:01  -  02 Jul 2018 07:00  --------------------------------------------------------  IN: 2259 mL / OUT: 520 mL / NET: 1739 mL      Mode: PS (Pressure Support)/ Spontaneous  FiO2: 40  PEEP: 5  PS: 7      LABS:                        10.3   8.61  )-----------( 235      ( 02 Jul 2018 04:38 )             33.2     07-02    143  |  105  |  41<H>  ----------------------------<  165<H>  4.2   |  22  |  1.0    Ca    8.4<L>      02 Jul 2018 04:38  Phos  4.9     07-02  Mg     2.2     07-02      PT/INR - ( 02 Jul 2018 04:38 )   PT: 11.50 sec;   INR: 1.07 ratio         PTT - ( 02 Jul 2018 04:38 )  PTT:25.8 sec    CAPILLARY BLOOD GLUCOSE        ABG - ( 02 Jul 2018 09:33 )  pH, Arterial: 7.37  pH, Blood: x     /  pCO2: 41    /  pO2: 106   / HCO3: 24    / Base Excess: -1.5  /  SaO2: 98                  RADIOLOGY & ADDITIONAL TESTS:    Consultant(s) Notes Reviewed:  [x ] YES  [ ] NO    MEDICATIONS  (STANDING):  ampicillin/sulbactam  IVPB 3 Gram(s) IV Intermittent every 6 hours  clindamycin IVPB 600 milliGRAM(s) IV Intermittent every 8 hours  dexamethasone  Injectable 4 milliGRAM(s) IV Push every 4 hours  docusate sodium 100 milliGRAM(s) Oral three times a day  enoxaparin Injectable 40 milliGRAM(s) SubCutaneous every 24 hours  fentaNYL   Infusion. 0.5 MICROgram(s)/kG/Hr (3.05 mL/Hr) IV Continuous <Continuous>  pantoprazole    Tablet 40 milliGRAM(s) Oral before breakfast  propofol Infusion 30 MICROgram(s)/kG/Min (10.98 mL/Hr) IV Continuous <Continuous>  sodium chloride 0.9%. 1000 milliLiter(s) (75 mL/Hr) IV Continuous <Continuous>    MEDICATIONS  (PRN):  aluminum hydroxide/magnesium hydroxide/simethicone Suspension 30 milliLiter(s) Oral every 4 hours PRN Dyspepsia  ibuprofen  Tablet 400 milliGRAM(s) Oral every 6 hours PRN Mild pain  ondansetron Injectable 4 milliGRAM(s) IV Push every 6 hours PRN Nausea  senna 2 Tablet(s) Oral at bedtime PRN Constipation      PHYSICAL EXAM:  GENERAL: NAD  HEAD:  left submandibular swelling and erythema. - improved from yesterday  CHEST/LUNG: Clear to auscultation bilaterally; No wheeze, crackles, or rales.  HEART: Regular rate and rhythm; No murmurs, rubs, or gallops  ABDOMEN: Soft, Nontender, Nondistended; Bowel sounds present  EXTREMITIES:  2+ Peripheral Pulses, No clubbing, cyanosis, or edema  SKIN: No rashes or lesions      Care Discussed with Consultants/Other Providers [ x] YES  [ ] NO Patient is a 89 y/o F who presents with a chief complaint of Left sided facial swelling, difficulty swallowing and muffled voice for 2 days. Patient was found to have left submandibular gland sialoadenitis and intubated electively for airway protection.      Patient was seen at bedside. Resting comfortable. Now extubated.       INTERVAL HPI/OVERNIGHT EVENTS:  ICU Vital Signs Last 24 Hrs  T(C): 36.1 (02 Jul 2018 08:00), Max: 36.8 (01 Jul 2018 11:55)  T(F): 97 (02 Jul 2018 08:00), Max: 98.3 (01 Jul 2018 11:55)  HR: 58 (02 Jul 2018 08:09) (50 - 84)  BP: 140/71 (02 Jul 2018 08:00) (92/47 - 143/80)  BP(mean): 105 (02 Jul 2018 08:00) (64 - 112)  ABP: --  ABP(mean): --  RR: 12 (02 Jul 2018 08:00) (10 - 19)  SpO2: 99% (02 Jul 2018 08:09) (97% - 100%)    I&O's Summary    01 Jul 2018 07:01  -  02 Jul 2018 07:00  --------------------------------------------------------  IN: 2259 mL / OUT: 520 mL / NET: 1739 mL      Mode: PS (Pressure Support)/ Spontaneous  FiO2: 40  PEEP: 5  PS: 7      LABS:                        10.3   8.61  )-----------( 235      ( 02 Jul 2018 04:38 )             33.2     07-02    143  |  105  |  41<H>  ----------------------------<  165<H>  4.2   |  22  |  1.0    Ca    8.4<L>      02 Jul 2018 04:38  Phos  4.9     07-02  Mg     2.2     07-02      PT/INR - ( 02 Jul 2018 04:38 )   PT: 11.50 sec;   INR: 1.07 ratio         PTT - ( 02 Jul 2018 04:38 )  PTT:25.8 sec    CAPILLARY BLOOD GLUCOSE        ABG - ( 02 Jul 2018 09:33 )  pH, Arterial: 7.37  pH, Blood: x     /  pCO2: 41    /  pO2: 106   / HCO3: 24    / Base Excess: -1.5  /  SaO2: 98                  RADIOLOGY & ADDITIONAL TESTS:    Consultant(s) Notes Reviewed:  [x ] YES  [ ] NO    MEDICATIONS  (STANDING):  ampicillin/sulbactam  IVPB 3 Gram(s) IV Intermittent every 6 hours  clindamycin IVPB 600 milliGRAM(s) IV Intermittent every 8 hours  dexamethasone  Injectable 4 milliGRAM(s) IV Push every 4 hours  docusate sodium 100 milliGRAM(s) Oral three times a day  enoxaparin Injectable 40 milliGRAM(s) SubCutaneous every 24 hours  fentaNYL   Infusion. 0.5 MICROgram(s)/kG/Hr (3.05 mL/Hr) IV Continuous <Continuous>  pantoprazole    Tablet 40 milliGRAM(s) Oral before breakfast  propofol Infusion 30 MICROgram(s)/kG/Min (10.98 mL/Hr) IV Continuous <Continuous>  sodium chloride 0.9%. 1000 milliLiter(s) (75 mL/Hr) IV Continuous <Continuous>    MEDICATIONS  (PRN):  aluminum hydroxide/magnesium hydroxide/simethicone Suspension 30 milliLiter(s) Oral every 4 hours PRN Dyspepsia  ibuprofen  Tablet 400 milliGRAM(s) Oral every 6 hours PRN Mild pain  ondansetron Injectable 4 milliGRAM(s) IV Push every 6 hours PRN Nausea  senna 2 Tablet(s) Oral at bedtime PRN Constipation      PHYSICAL EXAM:  GENERAL: NAD  HEAD:  left submandibular swelling and erythema. - improved from yesterday  CHEST/LUNG: Clear to auscultation bilaterally; No wheeze, crackles, or rales.  HEART: Regular rate and rhythm; No murmurs, rubs, or gallops  ABDOMEN: Soft, Nontender, Nondistended; Bowel sounds present  EXTREMITIES:  2+ Peripheral Pulses, No clubbing, cyanosis, or edema  SKIN: No rashes or lesions      Care Discussed with Consultants/Other Providers [ x] YES  [ ] NO

## 2018-07-02 NOTE — PROGRESS NOTE ADULT - ASSESSMENT
IMPRESSION    L Sialadenitis  ? Epiglottitis  COPD  PUD    PLAN:    SBT and extubate  ENT f/up  Cont IV abx and steroids  PO feeding if phonating adequately  DVT prophylaxis IMPRESSION    L Sialadenitis/ upper airway obstruction/ better  ? Epiglottitis  COPD  PUD    PLAN:    SBT and extubate  ENT f/up  Cont IV abx and steroids  PO feeding if phonating adequately  DVT prophylaxis  will follow

## 2018-07-02 NOTE — PROGRESS NOTE ADULT - SUBJECTIVE AND OBJECTIVE BOX
JANINE SNOW  89y, Female      OVERNIGHT EVENTS:    no fevers, no pressors, no diarrhea, alert, responsive    VITALS:  T(F): 98.3, Max: 98.3 (07-01-18 @ 11:55)  HR: 56  BP: 142/64  RR: 12Vital Signs Last 24 Hrs  T(C): 36.8 (02 Jul 2018 00:00), Max: 36.8 (01 Jul 2018 11:55)  T(F): 98.3 (02 Jul 2018 00:00), Max: 98.3 (01 Jul 2018 11:55)  HR: 56 (02 Jul 2018 06:00) (50 - 100)  BP: 142/64 (02 Jul 2018 06:00) (92/47 - 153/73)  BP(mean): 92 (02 Jul 2018 06:00) (64 - 112)  RR: 12 (02 Jul 2018 06:00) (10 - 19)  SpO2: 98% (02 Jul 2018 06:00) (97% - 100%)    TESTS & MEASUREMENTS:                        10.3   8.61  )-----------( 235      ( 02 Jul 2018 04:38 )             33.2     07-02    143  |  105  |  41<H>  ----------------------------<  165<H>  4.2   |  22  |  1.0    Ca    8.4<L>      02 Jul 2018 04:38  Phos  4.9     07-02  Mg     2.2     07-02                RADIOLOGY & ADDITIONAL TESTS:    ANTIBIOTICS:  ampicillin/sulbactam  IVPB 3 Gram(s) IV Intermittent every 6 hours  clindamycin IVPB 600 milliGRAM(s) IV Intermittent every 8 hours

## 2018-07-02 NOTE — PROGRESS NOTE ADULT - ASSESSMENT
Patient is a 89 y/o F who presents with a chief complaint of Left sided facial swelling, difficulty swallowing and muffled voice for 2 days. Patient was found to have left submandibular gland sialoadenitis and intubated for airway protection. Now extubated.    # Left-sided sialoadenitis with extensive left sided submandibular swelling and airway compromise.  - CT neck w/ contrast (6/29/18): Left submandibular gland sialoadenitis with a left submandibular duct stone. Extensive inflammatory changes within the left side of the neck, left floor of mouth, and left pharynx.  - ID following: Continue IV unasyn and clindamycin.  - Continue steroids  - IV hydration  - F/U ENT recommendations.    # PUD   - C/w IV protonix.    # COPD - not on home oxygen.  - Stable, not wheezing.    PO feeds if phonating adequately.   DVT ppx - lovenox  Dispo: - FULL CODE FROM HOME. Patient is a 89 y/o F who presents with a chief complaint of Left sided facial swelling, difficulty swallowing and muffled voice for 2 days. Patient was found to have left submandibular gland sialoadenitis and intubated for airway protection. Now extubated.    # Left-sided sialoadenitis with extensive left sided submandibular swelling and airway compromise.  - CT neck w/ contrast (6/29/18): Left submandibular gland sialoadenitis with a left submandibular duct stone. Extensive inflammatory changes within the left side of the neck, left floor of mouth, and left pharynx.  - ID following: Continue IV unasyn and clindamycin.  - Continue steroids  - Warm compress/gland massage  - IV hydration  - F/U ENT recommendations.    # PUD   - C/w IV protonix.    # COPD - not on home oxygen.  - Stable, not wheezing.    PO feeds if phonating adequately.   DVT ppx - lovenox  Dispo: - FULL CODE FROM HOME.

## 2018-07-02 NOTE — PROGRESS NOTE ADULT - ASSESSMENT
ssessment and Plan:   · Assessment      Patient is a 89 y/o F who presents with a chief complaint of Left sided facial swelling, difficulty swallowing and muffled voice for 2 days. Patient was found to have left submandibular gland sialoadenitis and intubated for airway protection. Now extubated.    # Left-sided sialoadenitis with extensive left sided submandibular swelling and airway compromise.  - CT neck w/ contrast (6/29/18): Left submandibular gland sialoadenitis with a left submandibular duct stone. Extensive inflammatory changes within the left side of the neck, left floor of mouth, and left pharynx.  - ID following: Continue IV unasyn and clindamycin.  - Continue steroids  - Warm compress/gland massage  - IV hydration  - F/U ENT recommendations.    # PUD   - C/w IV protonix.    # COPD - not on home oxygen.  - Stable, not wheezing.    PO feeds if phonating adequately.   DVT ppx - lovenox  Dispo: - FULL CODE FROM HOME.

## 2018-07-02 NOTE — PROGRESS NOTE ADULT - SUBJECTIVE AND OBJECTIVE BOX
Patient was seen and examined. Spoke with RN. Chart reviewed.    No events overnight.  Vital Signs Last 24 Hrs  T(F): 97 (02 Jul 2018 08:00), Max: 98.3 (02 Jul 2018 00:00)  HR: 60 (02 Jul 2018 12:00) (50 - 72)  BP: 131/55 (02 Jul 2018 12:00) (92/47 - 142/64)  SpO2: 99% (02 Jul 2018 12:00) (97% - 100%)  MEDICATIONS  (STANDING):  ampicillin/sulbactam  IVPB 3 Gram(s) IV Intermittent every 6 hours  clindamycin IVPB 600 milliGRAM(s) IV Intermittent every 8 hours  dexamethasone  Injectable 4 milliGRAM(s) IV Push every 4 hours  docusate sodium 100 milliGRAM(s) Oral three times a day  enoxaparin Injectable 40 milliGRAM(s) SubCutaneous every 24 hours  fentaNYL   Infusion. 0.5 MICROgram(s)/kG/Hr (3.05 mL/Hr) IV Continuous <Continuous>  pantoprazole    Tablet 40 milliGRAM(s) Oral before breakfast  propofol Infusion 30 MICROgram(s)/kG/Min (10.98 mL/Hr) IV Continuous <Continuous>  sodium chloride 0.9%. 1000 milliLiter(s) (75 mL/Hr) IV Continuous <Continuous>    MEDICATIONS  (PRN):  aluminum hydroxide/magnesium hydroxide/simethicone Suspension 30 milliLiter(s) Oral every 4 hours PRN Dyspepsia  ibuprofen  Tablet 400 milliGRAM(s) Oral every 6 hours PRN Mild pain  ondansetron Injectable 4 milliGRAM(s) IV Push every 6 hours PRN Nausea  senna 2 Tablet(s) Oral at bedtime PRN Constipation    Labs:                        10.3   8.61  )-----------( 235      ( 02 Jul 2018 04:38 )             33.2                         11.5   8.67  )-----------( 240      ( 01 Jul 2018 04:47 )             35.6     02 Jul 2018 04:38    143    |  105    |  41     ----------------------------<  165    4.2     |  22     |  1.0    01 Jul 2018 04:47    143    |  104    |  24     ----------------------------<  182    3.8     |  20     |  0.9      Ca    8.4        02 Jul 2018 04:38  Ca    8.6        01 Jul 2018 04:47  Phos  4.9       02 Jul 2018 04:38  Mg     2.2       02 Jul 2018 04:38      PT/INR - ( 02 Jul 2018 04:38 )   PT: 11.50 sec;   INR: 1.07 ratio         PTT - ( 02 Jul 2018 04:38 )  PTT:25.8 sec        Radiology:    General: comfortable, NAD  Neurology: A&Ox3, nonfocal  Head:  left facial,   ENT:  Mucosa moist, no ulcerations  Neck:  Supple, no JVD,   Skin: no breakdowns (as per RN)  Resp: CTA B/L  CV: RRR, S1S2,   GI: Soft, NT, bowel sounds  MS: No edema, + peripheral pulses, FROM all 4 extremity

## 2018-07-03 LAB
ANION GAP SERPL CALC-SCNC: 14 MMOL/L — SIGNIFICANT CHANGE UP (ref 7–14)
ANION GAP SERPL CALC-SCNC: 15 MMOL/L — HIGH (ref 7–14)
APTT BLD: 26.3 SEC — LOW (ref 27–39.2)
BUN SERPL-MCNC: 40 MG/DL — HIGH (ref 10–20)
BUN SERPL-MCNC: 43 MG/DL — HIGH (ref 10–20)
CALCIUM SERPL-MCNC: 8.4 MG/DL — LOW (ref 8.5–10.1)
CALCIUM SERPL-MCNC: 8.7 MG/DL — SIGNIFICANT CHANGE UP (ref 8.5–10.1)
CHLORIDE SERPL-SCNC: 103 MMOL/L — SIGNIFICANT CHANGE UP (ref 98–110)
CHLORIDE SERPL-SCNC: 111 MMOL/L — HIGH (ref 98–110)
CO2 SERPL-SCNC: 22 MMOL/L — SIGNIFICANT CHANGE UP (ref 17–32)
CO2 SERPL-SCNC: 24 MMOL/L — SIGNIFICANT CHANGE UP (ref 17–32)
CREAT SERPL-MCNC: 0.8 MG/DL — SIGNIFICANT CHANGE UP (ref 0.7–1.5)
CREAT SERPL-MCNC: 0.9 MG/DL — SIGNIFICANT CHANGE UP (ref 0.7–1.5)
GLUCOSE SERPL-MCNC: 137 MG/DL — HIGH (ref 70–99)
GLUCOSE SERPL-MCNC: 142 MG/DL — HIGH (ref 70–99)
HCT VFR BLD CALC: 34 % — LOW (ref 37–47)
HGB BLD-MCNC: 10.5 G/DL — LOW (ref 12–16)
INR BLD: 1.17 RATIO — SIGNIFICANT CHANGE UP (ref 0.65–1.3)
MCHC RBC-ENTMCNC: 25.9 PG — LOW (ref 27–31)
MCHC RBC-ENTMCNC: 30.9 G/DL — LOW (ref 32–37)
MCV RBC AUTO: 84 FL — SIGNIFICANT CHANGE UP (ref 81–99)
NRBC # BLD: 0 /100 WBCS — SIGNIFICANT CHANGE UP (ref 0–0)
PLATELET # BLD AUTO: 246 K/UL — SIGNIFICANT CHANGE UP (ref 130–400)
POTASSIUM SERPL-MCNC: 4.5 MMOL/L — SIGNIFICANT CHANGE UP (ref 3.5–5)
POTASSIUM SERPL-MCNC: 4.8 MMOL/L — SIGNIFICANT CHANGE UP (ref 3.5–5)
POTASSIUM SERPL-SCNC: 4.5 MMOL/L — SIGNIFICANT CHANGE UP (ref 3.5–5)
POTASSIUM SERPL-SCNC: 4.8 MMOL/L — SIGNIFICANT CHANGE UP (ref 3.5–5)
PROTHROM AB SERPL-ACNC: 12.6 SEC — SIGNIFICANT CHANGE UP (ref 9.95–12.87)
RBC # BLD: 4.05 M/UL — LOW (ref 4.2–5.4)
RBC # FLD: 19 % — HIGH (ref 11.5–14.5)
SODIUM SERPL-SCNC: 142 MMOL/L — SIGNIFICANT CHANGE UP (ref 135–146)
SODIUM SERPL-SCNC: 147 MMOL/L — HIGH (ref 135–146)
WBC # BLD: 7.39 K/UL — SIGNIFICANT CHANGE UP (ref 4.8–10.8)
WBC # FLD AUTO: 7.39 K/UL — SIGNIFICANT CHANGE UP (ref 4.8–10.8)

## 2018-07-03 PROCEDURE — 99232 SBSQ HOSP IP/OBS MODERATE 35: CPT

## 2018-07-03 RX ORDER — SODIUM CHLORIDE 9 MG/ML
1000 INJECTION, SOLUTION INTRAVENOUS
Refills: 0 | Status: DISCONTINUED | OUTPATIENT
Start: 2018-07-03 | End: 2018-07-05

## 2018-07-03 RX ORDER — SODIUM CHLORIDE 9 MG/ML
1000 INJECTION, SOLUTION INTRAVENOUS
Refills: 0 | Status: DISCONTINUED | OUTPATIENT
Start: 2018-07-03 | End: 2018-07-03

## 2018-07-03 RX ORDER — DEXAMETHASONE 0.5 MG/5ML
4 ELIXIR ORAL EVERY 8 HOURS
Refills: 0 | Status: DISCONTINUED | OUTPATIENT
Start: 2018-07-03 | End: 2018-07-05

## 2018-07-03 RX ORDER — KETOROLAC TROMETHAMINE 30 MG/ML
15 SYRINGE (ML) INJECTION ONCE
Refills: 0 | Status: DISCONTINUED | OUTPATIENT
Start: 2018-07-03 | End: 2018-07-03

## 2018-07-03 RX ADMIN — AMPICILLIN SODIUM AND SULBACTAM SODIUM 200 GRAM(S): 250; 125 INJECTION, POWDER, FOR SUSPENSION INTRAMUSCULAR; INTRAVENOUS at 18:58

## 2018-07-03 RX ADMIN — Medication 100 MILLIGRAM(S): at 18:30

## 2018-07-03 RX ADMIN — ENOXAPARIN SODIUM 40 MILLIGRAM(S): 100 INJECTION SUBCUTANEOUS at 18:29

## 2018-07-03 RX ADMIN — AMPICILLIN SODIUM AND SULBACTAM SODIUM 200 GRAM(S): 250; 125 INJECTION, POWDER, FOR SUSPENSION INTRAMUSCULAR; INTRAVENOUS at 05:59

## 2018-07-03 RX ADMIN — Medication 4 MILLIGRAM(S): at 06:10

## 2018-07-03 RX ADMIN — Medication 4 MILLIGRAM(S): at 21:27

## 2018-07-03 RX ADMIN — Medication 100 MILLIGRAM(S): at 06:10

## 2018-07-03 RX ADMIN — Medication 100 MILLIGRAM(S): at 21:27

## 2018-07-03 RX ADMIN — Medication 100 MILLIGRAM(S): at 21:28

## 2018-07-03 RX ADMIN — Medication 100 MILLIGRAM(S): at 05:23

## 2018-07-03 RX ADMIN — PANTOPRAZOLE SODIUM 40 MILLIGRAM(S): 20 TABLET, DELAYED RELEASE ORAL at 06:10

## 2018-07-03 RX ADMIN — SODIUM CHLORIDE 125 MILLILITER(S): 9 INJECTION, SOLUTION INTRAVENOUS at 21:27

## 2018-07-03 RX ADMIN — Medication 100 MILLIGRAM(S): at 18:29

## 2018-07-03 RX ADMIN — Medication 4 MILLIGRAM(S): at 18:29

## 2018-07-03 RX ADMIN — Medication 4 MILLIGRAM(S): at 01:22

## 2018-07-03 NOTE — SWALLOW BEDSIDE ASSESSMENT ADULT - SLP PERTINENT HISTORY OF CURRENT PROBLEM
pt admitted with submandibular swelling and odynophagia. found to have sialoadenitis 2* sialolith, prophylactic intubation

## 2018-07-03 NOTE — PROGRESS NOTE ADULT - ASSESSMENT
IMPRESSION:  Left sialadenitis which on PE is better.  Left peritonsillarly abscess.  Clinically improving.  Extubated.   NAD    RECOMMENDATIONS:  Clindamycin 600 mg iv q8h.  Unasyn 3 gm iv q6h.  F/u with ENT.

## 2018-07-03 NOTE — PROGRESS NOTE ADULT - ENMT COMMENTS
Left submandibular swelling is much less.
left submandibular swelling is reduced.
Minimal submandibular induration.

## 2018-07-03 NOTE — PROGRESS NOTE ADULT - SUBJECTIVE AND OBJECTIVE BOX
Over Night Events: Extubated yesterday, remained stable. Tolerated diet well,         ROS:  See HPI    PHYSICAL EXAM    ICU Vital Signs Last 24 Hrs  T(C): 36.2 (03 Jul 2018 08:00), Max: 36.6 (02 Jul 2018 12:00)  T(F): 97.1 (03 Jul 2018 08:00), Max: 97.9 (02 Jul 2018 12:00)  HR: 60 (03 Jul 2018 08:00) (52 - 88)  BP: 189/101 (03 Jul 2018 08:00) (125/58 - 189/101)  BP(mean): 149 (03 Jul 2018 08:00) (80 - 149)  ABP: --  ABP(mean): --  RR: 17 (03 Jul 2018 08:00) (11 - 22)  SpO2: 95% (03 Jul 2018 08:00) (95% - 99%)      General: Awake and oriented  HEENT: NOA             Lymph Nodes: No cervical LN   Lungs: Bilateral BS  Cardiovascular: Regular   Abdomen: Soft, Positive BS  Extremities: No clubbing   Skin: Warm  Neurological: Non focal       07-02-18 @ 07:01  -  07-03-18 @ 07:00  --------------------------------------------------------  IN:    fentaNYL Infusion.: 5 mL    IV PiggyBack: 250 mL    Oral Fluid: 510 mL    propofol Infusion: 6 mL    sodium chloride 0.9%.: 1800 mL  Total IN: 2571 mL    OUT:    Indwelling Catheter - Urethral: 340 mL    Voided: 450 mL  Total OUT: 790 mL    Total NET: 1781 mL          LABS:                          10.5   7.39  )-----------( 246      ( 03 Jul 2018 04:45 )             34.0                                               07-03    147<H>  |  111<H>  |  43<H>  ----------------------------<  142<H>  4.5   |  22  |  0.9    Ca    8.4<L>      03 Jul 2018 04:45  Phos  4.9     07-02  Mg     2.2     07-02        PT/INR - ( 03 Jul 2018 04:45 )   PT: 12.60 sec;   INR: 1.17 ratio         PTT - ( 03 Jul 2018 04:45 )  PTT:26.3 sec                                                                                                                              Culture - Blood (collected 01 Jul 2018 04:47)  Source: .Blood None  Preliminary Report (02 Jul 2018 16:01):    No growth to date.                                                                                       ABG - ( 02 Jul 2018 11:21 )  pH, Arterial: 7.38  pH, Blood: x     /  pCO2: 41    /  pO2: 97    / HCO3: 24    / Base Excess: -1.0  /  SaO2: 98                  MEDICATIONS  (STANDING):  ampicillin/sulbactam  IVPB 3 Gram(s) IV Intermittent every 6 hours  clindamycin IVPB 600 milliGRAM(s) IV Intermittent every 8 hours  dexamethasone  Injectable 4 milliGRAM(s) IV Push every 4 hours  docusate sodium 100 milliGRAM(s) Oral three times a day  enoxaparin Injectable 40 milliGRAM(s) SubCutaneous every 24 hours  pantoprazole    Tablet 40 milliGRAM(s) Oral before breakfast  sodium chloride 0.9%. 1000 milliLiter(s) (75 mL/Hr) IV Continuous <Continuous>    MEDICATIONS  (PRN):  aluminum hydroxide/magnesium hydroxide/simethicone Suspension 30 milliLiter(s) Oral every 4 hours PRN Dyspepsia  ibuprofen  Tablet 400 milliGRAM(s) Oral every 6 hours PRN Mild pain  ondansetron Injectable 4 milliGRAM(s) IV Push every 6 hours PRN Nausea  senna 2 Tablet(s) Oral at bedtime PRN Constipation      Xrays:                                                       Unchanged CXR                              ECHO Over Night Events: Extubated yesterday, remained stable. Tolerated diet well, sitting on chair.         ROS:  See HPI    PHYSICAL EXAM    ICU Vital Signs Last 24 Hrs  T(C): 36.2 (03 Jul 2018 08:00), Max: 36.6 (02 Jul 2018 12:00)  T(F): 97.1 (03 Jul 2018 08:00), Max: 97.9 (02 Jul 2018 12:00)  HR: 60 (03 Jul 2018 08:00) (52 - 88)  BP: 189/101 (03 Jul 2018 08:00) (125/58 - 189/101)  BP(mean): 149 (03 Jul 2018 08:00) (80 - 149)  ABP: --  ABP(mean): --  RR: 17 (03 Jul 2018 08:00) (11 - 22)  SpO2: 95% (03 Jul 2018 08:00) (95% - 99%)      General: Awake and oriented  HEENT: NOA / improved swelling            Lymph Nodes: No cervical LN   Lungs: Bilateral BS, dec BS  Cardiovascular: Regular   Abdomen: Soft, Positive BS  Extremities: No clubbing   Skin: Warm  Neurological: Non focal       07-02-18 @ 07:01  -  07-03-18 @ 07:00  --------------------------------------------------------  IN:    fentaNYL Infusion.: 5 mL    IV PiggyBack: 250 mL    Oral Fluid: 510 mL    propofol Infusion: 6 mL    sodium chloride 0.9%.: 1800 mL  Total IN: 2571 mL    OUT:    Indwelling Catheter - Urethral: 340 mL    Voided: 450 mL  Total OUT: 790 mL    Total NET: 1781 mL          LABS:                          10.5   7.39  )-----------( 246      ( 03 Jul 2018 04:45 )             34.0                                               07-03    147<H>  |  111<H>  |  43<H>  ----------------------------<  142<H>  4.5   |  22  |  0.9    Ca    8.4<L>      03 Jul 2018 04:45  Phos  4.9     07-02  Mg     2.2     07-02        PT/INR - ( 03 Jul 2018 04:45 )   PT: 12.60 sec;   INR: 1.17 ratio         PTT - ( 03 Jul 2018 04:45 )  PTT:26.3 sec                                                                                                                              Culture - Blood (collected 01 Jul 2018 04:47)  Source: .Blood None  Preliminary Report (02 Jul 2018 16:01):    No growth to date.                                                                                       ABG - ( 02 Jul 2018 11:21 )  pH, Arterial: 7.38  pH, Blood: x     /  pCO2: 41    /  pO2: 97    / HCO3: 24    / Base Excess: -1.0  /  SaO2: 98                  MEDICATIONS  (STANDING):  ampicillin/sulbactam  IVPB 3 Gram(s) IV Intermittent every 6 hours  clindamycin IVPB 600 milliGRAM(s) IV Intermittent every 8 hours  dexamethasone  Injectable 4 milliGRAM(s) IV Push every 4 hours  docusate sodium 100 milliGRAM(s) Oral three times a day  enoxaparin Injectable 40 milliGRAM(s) SubCutaneous every 24 hours  pantoprazole    Tablet 40 milliGRAM(s) Oral before breakfast  sodium chloride 0.9%. 1000 milliLiter(s) (75 mL/Hr) IV Continuous <Continuous>    MEDICATIONS  (PRN):  aluminum hydroxide/magnesium hydroxide/simethicone Suspension 30 milliLiter(s) Oral every 4 hours PRN Dyspepsia  ibuprofen  Tablet 400 milliGRAM(s) Oral every 6 hours PRN Mild pain  ondansetron Injectable 4 milliGRAM(s) IV Push every 6 hours PRN Nausea  senna 2 Tablet(s) Oral at bedtime PRN Constipation      Xrays:                                                       Unchanged CXR                              ECHO

## 2018-07-03 NOTE — PROGRESS NOTE ADULT - ASSESSMENT
Patient is a 89 y/o F who presents with a chief complaint of Left sided facial swelling, difficulty swallowing and muffled voice for 2 days. Patient was found to have left submandibular gland sialoadenitis and intubated for airway protection. Now extubated.    # Left-sided sialoadenitis with extensive left sided submandibular swelling and airway compromise.  - CT neck w/ contrast (6/29/18): Left submandibular gland sialoadenitis with a left submandibular duct stone. Extensive inflammatory changes within the left side of the neck, left floor of mouth, and left pharynx.  - ID following: Continue IV unasyn and clindamycin.  - Decrease Dexamethasone to Q8 than taper and dc  - Warm compress/gland massage  - IV hydration  - F/U ENT recommendations.  - Transfer to floor today.    # PUD   - C/w IV protonix.    # COPD - not on home oxygen.  - Stable, not wheezing.    # Hypernatremia  - IV fluids  - F/u 4 PM BMP    Soft diet for now, as pt complaining of odynophagia. Advance to regular diet as tolerated.   DVT ppx - lovenox  Dispo: - FULL CODE FROM HOME Patient is a 89 y/o F who presents with a chief complaint of Left sided facial swelling, difficulty swallowing and muffled voice for 2 days. Patient was found to have left submandibular gland sialoadenitis and intubated for airway protection. Now extubated.    # Left-sided sialoadenitis with extensive left sided submandibular swelling and airway compromise.  - CT neck w/ contrast (6/29/18): Left submandibular gland sialoadenitis with a left submandibular duct stone. Extensive inflammatory changes within the left side of the neck, left floor of mouth, and left pharynx.  - ID following: Continue IV unasyn and clindamycin.  - Decrease Dexamethasone to Q8 than taper and dc  - Warm compress/gland massage  - IV hydration  - F/U ENT recommendations.  - Transfer to floor today.    # PUD   - C/w IV protonix.    # COPD - not on home oxygen.  - Stable, not wheezing.    # Hypernatremia  - IV fluids  - F/u 4 PM BMP    # Constipation  - Start colace    Soft diet for now, as pt complaining of odynophagia. Advance to regular diet as tolerated.   DVT ppx - lovenox  Dispo: - FULL CODE FROM HOME Patient is a 89 y/o F who presents with a chief complaint of Left sided facial swelling, difficulty swallowing and muffled voice for 2 days. Patient was found to have left submandibular gland sialoadenitis and intubated for airway protection. Now extubated.    # Left-sided sialoadenitis with extensive left sided submandibular swelling and airway compromise.  - CT neck w/ contrast (6/29/18): Left submandibular gland sialoadenitis with a left submandibular duct stone. Extensive inflammatory changes within the left side of the neck, left floor of mouth, and left pharynx.  - ID following: Continue IV unasyn and clindamycin.  - Decrease Dexamethasone to Q8 than taper and dc  - Warm compress/gland massage  - IV hydration  - F/U ENT recommendations.  - Transfer to floor today.    # PUD   - C/w IV protonix.    # COPD - not on home oxygen.  - Stable, not wheezing.    # Hypernatremia  - IV fluids  - F/u 4 PM BMP    # Constipation  - no BM for 3-4 days  - Colace    Soft diet for now, as pt complaining of odynophagia. Advance to regular diet as tolerated.   DVT ppx - lovenox  Dispo: - FULL CODE FROM HOME Patient is a 89 y/o F who presents with a chief complaint of Left sided facial swelling, difficulty swallowing and muffled voice for 2 days. Patient was found to have left submandibular gland sialoadenitis and intubated for airway protection. Now extubated.    # Left-sided sialoadenitis with extensive left sided submandibular swelling and airway compromise.  - CT neck w/ contrast (6/29/18): Left submandibular gland sialoadenitis with a left submandibular duct stone. Extensive inflammatory changes within the left side of the neck, left floor of mouth, and left pharynx.  - ID following: Continue IV unasyn and clindamycin.  - Decrease Dexamethasone to Q8 than taper and dc  - Warm compress/gland massage  - IV hydration  - F/U ENT recommendations.  - Transfer to floor today.    # PUD   - C/w IV protonix.    # COPD - not on home oxygen.  - Stable, not wheezing.    # Hypernatremia  - Free water deficit 1.4 L  - IV fluids with D5W  - F/u 4 PM BMP    # Constipation  - no BM for 3-4 days  - Colace    Soft diet for now, as pt complaining of odynophagia. Advance to regular diet as tolerated.   DVT ppx - lovenox  Dispo: - FULL CODE FROM HOME Patient is a 89 y/o F who presents with a chief complaint of Left sided facial swelling, difficulty swallowing and muffled voice for 2 days. Patient was found to have left submandibular gland sialoadenitis and intubated for airway protection. Now extubated.    # Left-sided sialoadenitis with extensive left sided submandibular swelling and airway compromise.  - CT neck w/ contrast (6/29/18): Left submandibular gland sialoadenitis with a left submandibular duct stone. Extensive inflammatory changes within the left side of the neck, left floor of mouth, and left pharynx.  - ID following: Continue IV unasyn and clindamycin.  - Decrease Dexamethasone to Q8 than taper and dc  - Warm compress/gland massage  - IV hydration  - F/U ENT recommendations.  - Transfer to floor today.    # PUD   - C/w IV protonix.    # COPD - not on home oxygen.  - Stable, not wheezing.    # Hypernatremia  - Free water deficit 1.4 L  - IV fluids with D5W  - F/u 4 PM BMP    # Constipation  - no BM for 3-4 days  - Colace    # Odynophagia - likely from intubation.   - Soft diet for now, as pt complaining of odynophagia. Advance to regular diet as tolerated.   - F/u speech and swallow    DVT ppx - lovenox  Dispo: - FULL CODE FROM HOME

## 2018-07-03 NOTE — SWALLOW BEDSIDE ASSESSMENT ADULT - SWALLOW EVAL: DIAGNOSIS
toleration observed for soft and thins. pt reports pain with solids foods and therefore regular not trialed

## 2018-07-03 NOTE — PROGRESS NOTE ADULT - SUBJECTIVE AND OBJECTIVE BOX
Pt is a 89 y.o female with sialoadenitis 2* sialolith, extubated yesterday. PT seen and examined at bedside, doing well. Pt still c/o pain in throat and odynophagia, but taking some PO juice this AM. PT overall feels much better - swelling has decreased. PT denies any SOB/diff breathing.    MEDICATIONS  (STANDING):  ampicillin/sulbactam IVPB 3 Gram(s) IV Intermittent every 6 hours  clindamycin IVPB 600 milliGRAM(s) IV Intermittent every 8 hours  dexamethasone Injectable 4 milliGRAM(s) IV Push every 8 hours  docusate sodium 100 milliGRAM(s) Oral three times a day  enoxaparin Injectable 40 milliGRAM(s) SubCutaneous every 24 hours  pantoprazole Tablet 40 milliGRAM(s) Oral before breakfast  sodium chloride 0.9%. 1000 milliLiter(s) (75 mL/Hr) IV Continuous    Vital Signs: T:(F): 97.1 (03 Jul 2018 08:00), Max: 97.9 (02 Jul 2018 12:00), HR: 60, BP: 189/101, RR: 17, SpO2: 95%  GEN: NAD, awake and alert  HEENT: NC/AT, oral mucosa pink, no erythema/edema, uvula midline. FOM still with some edema, + stone palpated. no drainage from duct with gland massage. neck supple, + L submandibular gland palpated, getting smaller in size, still firm.                          10.5   7.39  )-----------( 246      ( 03 Jul 2018 04:45 )             34.0

## 2018-07-03 NOTE — CHART NOTE - NSCHARTNOTEFT_GEN_A_CORE
Patient is a 89 y/o F who presents with a chief complaint of Left sided facial swelling, difficulty swallowing and muffled voice for 2 days (30 Jun 2018 09:06). PMH: COPD not on home oxygen, hiatal hernia, OA, diverticulosis, PUD s/p UGIB in 2017.     Initially, Pt presented to ER [facility] and was transferred to [place] for ENT evaluation as CT neck showed prelim findings of peritonsillar abscess. Pt was seen by ENT and laryngoscope showed diffuse swelling of left side of mouth, swelling of epiglottis with secretions, and patient was intubated for impending airway compromise.     CT showed left submandibular gland sialoadenitis with extensive swelling (not abscess). Swelling has largely improved and patient is now extubated.    # Left-sided sialoadenitis with extensive left sided submandibular swelling and airway compromise.  - CT neck w/ contrast (6/29/18): Left submandibular gland sialoadenitis with a left submandibular duct stone. Extensive inflammatory changes within the left side of the neck, left floor of mouth, and left pharynx.  - ID following: Continue IV unasyn and clindamycin.  - Decrease Dexamethasone 4 MG to Q8 than taper and dc  - Warm compress/gland massage  - IV hydration  - F/U ENT recommendations.  - Transfer to floor today.    # PUD   - C/w IV protonix.    # COPD - not on home oxygen.  - Stable, not wheezing.    # Hypernatremia  - Free water deficit 1.4 L  - IV fluids with D5W  - F/u 4 PM BMP    # Constipation  - no BM for 3-4 days  - Colace    # Odynophagia - likely from intubation.   - DIET: Dysphagia 3 mechanical soft cut up. Advance to regular diet as tolerated.   - F/u speech and swallow    DVT ppx - lovenox  Dispo: - FULL CODE FROM HOME Patient is a 89 y/o F who presents with a chief complaint of Left sided facial swelling, difficulty swallowing and muffled voice for 2 days (30 Jun 2018 09:06). PMH: COPD not on home oxygen, hiatal hernia, OA, diverticulosis, PUD s/p UGIB in 2017.     Initially, Pt presented to ER [facility] and was transferred to [place] for ENT evaluation as CT neck showed prelim findings of peritonsillar abscess. Pt was seen by ENT and laryngoscope showed diffuse swelling of left side of mouth, swelling of epiglottis with secretions, and patient was intubated for impending airway compromise.     CT showed left submandibular gland sialoadenitis with extensive swelling (not abscess). Swelling has largely improved and patient is now extubated.    # Left-sided sialoadenitis with extensive left sided submandibular swelling and airway compromise.  - CT neck w/ contrast (6/29/18): Left submandibular gland sialoadenitis with a left submandibular duct stone. Extensive inflammatory changes within the left side of the neck, left floor of mouth, and left pharynx.  - ID following: Continue IV unasyn and clindamycin.  - Decrease Dexamethasone 4 MG to Q8 than taper and dc  - Warm compress/gland massage  - IV hydration  - F/U ENT recommendations.  - Transfer to floor today.    # PUD   - C/w IV protonix.    # COPD - not on home oxygen.  - Stable, not wheezing.    # Hypernatremia  - Free water deficit 1.4 L  - IV fluids with D5W  - F/u 8 PM BMP    # Constipation  - no BM for 3-4 days  - Colace    # Odynophagia - likely from intubation.   - DIET: Dysphagia 3 mechanical soft cut up. Advance to regular diet as tolerated.   - F/u speech and swallow    DVT ppx - lovenox  Dispo: - FULL CODE FROM HOME

## 2018-07-03 NOTE — PROGRESS NOTE ADULT - ASSESSMENT
IMPRESSION    L Sialadenitis/ upper airway obstruction/ better  ? Epiglottitis  COPD  PUD  Hypernatremia    PLAN:    Toradol IV X 1  Calculate free water deficit and switch IVF  Encourage PO intake  ENT f/up  Cont IV abx   Decrease Dexamethasone to Q8, ENT in agreement  DVT prophylaxis  will follow  Transfer to floor IMPRESSION    L Sialadenitis/ upper airway obstruction/ better s/p extubation  COPD  PUD  Hypernatremia    PLAN:      Encourage PO intake, repeat cmp  ENT f/up  Cont IV abx   Decrease Dexamethasone to Q8 than taper and dc  DVT prophylaxis  will follow  Transfer to floor  spoke with daughter/ patient at bedside

## 2018-07-03 NOTE — PROGRESS NOTE ADULT - SUBJECTIVE AND OBJECTIVE BOX
JANINE SNOW  89y, Female      OVERNIGHT EVENTS:    Extubated, NAD, minmal left submandibular pain, no drooling, no SOB    VITALS:  T(F): 97.1, Max: 97.9 (07-02-18 @ 12:00)  HR: 52  BP: 158/64  RR: 12Vital Signs Last 24 Hrs  T(C): 36.2 (03 Jul 2018 00:00), Max: 36.6 (02 Jul 2018 12:00)  T(F): 97.1 (03 Jul 2018 00:00), Max: 97.9 (02 Jul 2018 12:00)  HR: 52 (03 Jul 2018 06:00) (52 - 88)  BP: 158/64 (03 Jul 2018 06:00) (125/58 - 158/64)  BP(mean): 98 (03 Jul 2018 06:00) (80 - 112)  RR: 12 (03 Jul 2018 06:00) (11 - 22)  SpO2: 98% (03 Jul 2018 06:00) (97% - 99%)    TESTS & MEASUREMENTS:                        10.5   7.39  )-----------( 246      ( 03 Jul 2018 04:45 )             34.0     07-03    147<H>  |  111<H>  |  43<H>  ----------------------------<  142<H>  4.5   |  22  |  0.9    Ca    8.4<L>      03 Jul 2018 04:45  Phos  4.9     07-02  Mg     2.2     07-02          Culture - Blood (collected 07-01-18 @ 04:47)  Source: .Blood None  Preliminary Report (07-02-18 @ 16:01):    No growth to date.            RADIOLOGY & ADDITIONAL TESTS:    ANTIBIOTICS:  ampicillin/sulbactam  IVPB 3 Gram(s) IV Intermittent every 6 hours  clindamycin IVPB 600 milliGRAM(s) IV Intermittent every 8 hours

## 2018-07-03 NOTE — PROGRESS NOTE ADULT - ASSESSMENT
89 y.o female with sialoadenitis 2* sialolith - clinically improving.    ·	cont abx  ·	can wean decadron  ·	soft diet/full liquid as tolerated  ·	if swelling continues to go down, may be able to remove stone as IP, otherwise pt will f/u OP for removal in office  ·	w/d with attng, will follow

## 2018-07-04 LAB
ALBUMIN SERPL ELPH-MCNC: 3.5 G/DL — SIGNIFICANT CHANGE UP (ref 3.5–5.2)
ALP SERPL-CCNC: 43 U/L — SIGNIFICANT CHANGE UP (ref 30–115)
ALT FLD-CCNC: 10 U/L — SIGNIFICANT CHANGE UP (ref 0–41)
ANION GAP SERPL CALC-SCNC: 16 MMOL/L — HIGH (ref 7–14)
APPEARANCE UR: CLEAR — SIGNIFICANT CHANGE UP
APTT BLD: 27.3 SEC — SIGNIFICANT CHANGE UP (ref 27–39.2)
AST SERPL-CCNC: 12 U/L — SIGNIFICANT CHANGE UP (ref 0–41)
BASOPHILS # BLD AUTO: 0.01 K/UL — SIGNIFICANT CHANGE UP (ref 0–0.2)
BASOPHILS NFR BLD AUTO: 0.1 % — SIGNIFICANT CHANGE UP (ref 0–1)
BILIRUB SERPL-MCNC: 0.6 MG/DL — SIGNIFICANT CHANGE UP (ref 0.2–1.2)
BILIRUB UR-MCNC: NEGATIVE — SIGNIFICANT CHANGE UP
BUN SERPL-MCNC: 34 MG/DL — HIGH (ref 10–20)
CALCIUM SERPL-MCNC: 8.8 MG/DL — SIGNIFICANT CHANGE UP (ref 8.5–10.1)
CHLORIDE SERPL-SCNC: 100 MMOL/L — SIGNIFICANT CHANGE UP (ref 98–110)
CO2 SERPL-SCNC: 24 MMOL/L — SIGNIFICANT CHANGE UP (ref 17–32)
COLOR SPEC: YELLOW — SIGNIFICANT CHANGE UP
CREAT SERPL-MCNC: 0.7 MG/DL — SIGNIFICANT CHANGE UP (ref 0.7–1.5)
DIFF PNL FLD: NEGATIVE — SIGNIFICANT CHANGE UP
EOSINOPHIL # BLD AUTO: 0 K/UL — SIGNIFICANT CHANGE UP (ref 0–0.7)
EOSINOPHIL NFR BLD AUTO: 0 % — SIGNIFICANT CHANGE UP (ref 0–8)
GLUCOSE SERPL-MCNC: 137 MG/DL — HIGH (ref 70–99)
GLUCOSE UR QL: NEGATIVE MG/DL — SIGNIFICANT CHANGE UP
HCT VFR BLD CALC: 35.9 % — LOW (ref 37–47)
HGB BLD-MCNC: 11.5 G/DL — LOW (ref 12–16)
IMM GRANULOCYTES NFR BLD AUTO: 1.6 % — HIGH (ref 0.1–0.3)
INR BLD: 1.11 RATIO — SIGNIFICANT CHANGE UP (ref 0.65–1.3)
KETONES UR-MCNC: NEGATIVE — SIGNIFICANT CHANGE UP
LEUKOCYTE ESTERASE UR-ACNC: NEGATIVE — SIGNIFICANT CHANGE UP
LYMPHOCYTES # BLD AUTO: 0.55 K/UL — LOW (ref 1.2–3.4)
LYMPHOCYTES # BLD AUTO: 7.3 % — LOW (ref 20.5–51.1)
MAGNESIUM SERPL-MCNC: 2.4 MG/DL — SIGNIFICANT CHANGE UP (ref 1.8–2.4)
MCHC RBC-ENTMCNC: 26.6 PG — LOW (ref 27–31)
MCHC RBC-ENTMCNC: 32 G/DL — SIGNIFICANT CHANGE UP (ref 32–37)
MCV RBC AUTO: 82.9 FL — SIGNIFICANT CHANGE UP (ref 81–99)
MONOCYTES # BLD AUTO: 0.4 K/UL — SIGNIFICANT CHANGE UP (ref 0.1–0.6)
MONOCYTES NFR BLD AUTO: 5.3 % — SIGNIFICANT CHANGE UP (ref 1.7–9.3)
NEUTROPHILS # BLD AUTO: 6.49 K/UL — SIGNIFICANT CHANGE UP (ref 1.4–6.5)
NEUTROPHILS NFR BLD AUTO: 85.7 % — HIGH (ref 42.2–75.2)
NITRITE UR-MCNC: NEGATIVE — SIGNIFICANT CHANGE UP
NRBC # BLD: 0 /100 WBCS — SIGNIFICANT CHANGE UP (ref 0–0)
PH UR: 7 — SIGNIFICANT CHANGE UP (ref 5–8)
PLATELET # BLD AUTO: 275 K/UL — SIGNIFICANT CHANGE UP (ref 130–400)
POTASSIUM SERPL-MCNC: 4.4 MMOL/L — SIGNIFICANT CHANGE UP (ref 3.5–5)
POTASSIUM SERPL-SCNC: 4.4 MMOL/L — SIGNIFICANT CHANGE UP (ref 3.5–5)
PROT SERPL-MCNC: 6.4 G/DL — SIGNIFICANT CHANGE UP (ref 6–8)
PROT UR-MCNC: NEGATIVE MG/DL — SIGNIFICANT CHANGE UP
PROTHROM AB SERPL-ACNC: 12 SEC — SIGNIFICANT CHANGE UP (ref 9.95–12.87)
RBC # BLD: 4.33 M/UL — SIGNIFICANT CHANGE UP (ref 4.2–5.4)
RBC # FLD: 18.3 % — HIGH (ref 11.5–14.5)
SODIUM SERPL-SCNC: 140 MMOL/L — SIGNIFICANT CHANGE UP (ref 135–146)
SP GR SPEC: 1.01 — SIGNIFICANT CHANGE UP (ref 1.01–1.03)
UROBILINOGEN FLD QL: 0.2 MG/DL — SIGNIFICANT CHANGE UP (ref 0.2–0.2)
WBC # BLD: 7.57 K/UL — SIGNIFICANT CHANGE UP (ref 4.8–10.8)
WBC # FLD AUTO: 7.57 K/UL — SIGNIFICANT CHANGE UP (ref 4.8–10.8)

## 2018-07-04 RX ORDER — LANOLIN ALCOHOL/MO/W.PET/CERES
3 CREAM (GRAM) TOPICAL AT BEDTIME
Refills: 0 | Status: DISCONTINUED | OUTPATIENT
Start: 2018-07-04 | End: 2018-07-09

## 2018-07-04 RX ADMIN — AMPICILLIN SODIUM AND SULBACTAM SODIUM 200 GRAM(S): 250; 125 INJECTION, POWDER, FOR SUSPENSION INTRAMUSCULAR; INTRAVENOUS at 06:13

## 2018-07-04 RX ADMIN — Medication 100 MILLIGRAM(S): at 22:51

## 2018-07-04 RX ADMIN — AMPICILLIN SODIUM AND SULBACTAM SODIUM 200 GRAM(S): 250; 125 INJECTION, POWDER, FOR SUSPENSION INTRAMUSCULAR; INTRAVENOUS at 19:06

## 2018-07-04 RX ADMIN — Medication 4 MILLIGRAM(S): at 22:51

## 2018-07-04 RX ADMIN — ENOXAPARIN SODIUM 40 MILLIGRAM(S): 100 INJECTION SUBCUTANEOUS at 11:53

## 2018-07-04 RX ADMIN — Medication 3 MILLIGRAM(S): at 23:53

## 2018-07-04 RX ADMIN — Medication 4 MILLIGRAM(S): at 17:43

## 2018-07-04 RX ADMIN — AMPICILLIN SODIUM AND SULBACTAM SODIUM 200 GRAM(S): 250; 125 INJECTION, POWDER, FOR SUSPENSION INTRAMUSCULAR; INTRAVENOUS at 00:10

## 2018-07-04 RX ADMIN — Medication 100 MILLIGRAM(S): at 17:43

## 2018-07-04 RX ADMIN — PANTOPRAZOLE SODIUM 40 MILLIGRAM(S): 20 TABLET, DELAYED RELEASE ORAL at 06:14

## 2018-07-04 RX ADMIN — AMPICILLIN SODIUM AND SULBACTAM SODIUM 200 GRAM(S): 250; 125 INJECTION, POWDER, FOR SUSPENSION INTRAMUSCULAR; INTRAVENOUS at 23:00

## 2018-07-04 RX ADMIN — Medication 100 MILLIGRAM(S): at 06:13

## 2018-07-04 RX ADMIN — Medication 100 MILLIGRAM(S): at 22:00

## 2018-07-04 RX ADMIN — Medication 100 MILLIGRAM(S): at 17:42

## 2018-07-04 RX ADMIN — AMPICILLIN SODIUM AND SULBACTAM SODIUM 200 GRAM(S): 250; 125 INJECTION, POWDER, FOR SUSPENSION INTRAMUSCULAR; INTRAVENOUS at 11:52

## 2018-07-04 RX ADMIN — Medication 4 MILLIGRAM(S): at 06:13

## 2018-07-04 RX ADMIN — Medication 100 MILLIGRAM(S): at 06:14

## 2018-07-04 NOTE — CHART NOTE - NSCHARTNOTEFT_GEN_A_CORE
Registered Dietitian Follow-Up     Patient Profile Reviewed                           Yes [x]   No []     Nutrition History Previously Obtained        Yes [x]  No []       Pertinent Subjective Information: Pt. resting in bed during visit, appears fatigued. Per pt. no improvement with poor appetite and odynophagia.      Pertinent Medical Interventions: S/p extubation- pt. transferred from ICU. Pain in throat and odynophagia ongoing- on abx. Sialoadenitis 2* sialolith- overall clinically improving.      Diet order: dysphagia 3 soft nectar consistency liquids     Anthropometrics:  - Ht. 149.8cm  - Wt. 64kg on 7/3 vs 61kg on admission 6/29 ?etiology of weight change, will continue to monitor   - %wt change  - BMI 28.5  - IBW 97.5lbs      Pertinent Lab Data:(7/4) Hg 11.5, Hct 35.9, BUN 40, glu 137     Pertinent Meds: Protonix, Colace, Zofran      Physical Findings:  - Appearance: alert&oriented   - GI function: denies symptoms, last BM- 7/3 per pt. states unsure  - Tubes: no tubes noted  - Oral/Mouth cavity: odynophagia   - Skin: intact (BS 17)     Nutrition Requirements (adjusted from RD note on 7/1)  Weight Used: dosing 64kg     Estimated Energy Needs    Continue []  Adjust [x] 1159-1256kcal (MSJ x 1.2-1.3 AF)  Adjusted Energy Recommendations:   kcal/day        Estimated Protein Needs    Continue []  Adjust [x] 64-76g (1-1.2g/kg CBW)  Adjusted Protein Recommendations:   gm/day        Estimated Fluid Needs        Continue []  Adjust [x] 1ml/kcal or per LIP  Adjusted Fluid Recommendations:   mL/day     Nutrient Intake: pt. reports <50% PO at meals        [] Previous Nutrition Diagnosis: Inadequate protein energy intake            [x] Ongoing          [] Resolved      Nutrition Intervention: meals and snacks, medical food supplement     Rec: continue dysphagia 3 soft nectar consistency liquid diet       Goal/Expected Outcome: In 3 days pt. to consume at least 50-75% PO and supplement      Indicator/Monitoring: energy intake, body composition, NFPF Registered Dietitian Follow-Up     Patient Profile Reviewed                           Yes [x]   No []     Nutrition History Previously Obtained        Yes [x]  No []       Pertinent Subjective Information: Pt. resting in bed during visit, appears fatigued. Pt. reports tolerating diet, but no improvement with poor appetite and odynophagia.     Pertinent Medical Interventions: S/p extubation- pt. transferred from ICU. Pain in throat and odynophagia ongoing- on abx. Sialoadenitis 2* sialolith- overall clinically improving. SLP evaluated pt. rec dysphagia 3 soft nectar consistency liquid diet.       Diet order: dysphagia 3 soft nectar consistency liquids     Anthropometrics:  - Ht. 149.8cm  - Wt. 64kg on 7/3 vs 61kg on admission 6/29 ?etiology of weight change, will continue to monitor   - %wt change  - BMI 28.5  - IBW 97.5lbs      Pertinent Lab Data:(7/4) Hg 11.5, Hct 35.9, BUN 40, glu 137     Pertinent Meds: Protonix, Colace, Zofran      Physical Findings:  - Appearance: alert&oriented   - GI function: denies symptoms, last BM- 7/3 per pt. states unsure  - Tubes: no tubes noted  - Oral/Mouth cavity: odynophagia   - Skin: intact (BS 17)     Nutrition Requirements (adjusted from RD note on 7/1)  Weight Used: dosing 64kg     Estimated Energy Needs    Continue []  Adjust [x] 1159-1256kcal (MSJ x 1.2-1.3 AF)  Adjusted Energy Recommendations:   kcal/day        Estimated Protein Needs    Continue []  Adjust [x] 64-76g (1-1.2g/kg CBW)  Adjusted Protein Recommendations:   gm/day        Estimated Fluid Needs        Continue []  Adjust [x] 1ml/kcal or per LIP  Adjusted Fluid Recommendations:   mL/day     Nutrient Intake: pt. reports <50% PO at meals        [] Previous Nutrition Diagnosis: Inadequate protein energy intake            [x] Ongoing          [] Resolved      Nutrition Intervention: meals and snacks, medical food supplement     Rec: continue dysphagia 3 soft nectar consistency liquid diet       Goal/Expected Outcome: In 3 days pt. to consume at least 50-75% PO and supplement      Indicator/Monitoring: energy intake, body composition, NFPF Registered Dietitian Follow-Up     Patient Profile Reviewed                           Yes [x]   No []     Nutrition History Previously Obtained        Yes [x]  No []       Pertinent Subjective Information: Pt. resting in bed during visit, appears fatigued. Pt. reports tolerating diet, but no improvement with poor appetite and ageusia. pt. states everything tastes bland to her and she has no desire to eat anything.      Pertinent Medical Interventions: S/p extubation- pt. transferred from ICU. Pain in throat and odynophagia ongoing- on abx. Sialoadenitis 2* sialolith- overall clinically improving. SLP evaluated pt. rec dysphagia 3 soft nectar consistency liquid diet.       Diet order: dysphagia 3 soft nectar consistency liquids     Anthropometrics:  - Ht. 149.8cm  - Wt. 64kg on 7/3 vs 61kg on admission 6/29 ?etiology of weight change, will continue to monitor   - %wt change  - BMI 28.5  - IBW 97.5lbs      Pertinent Lab Data:(7/4) Hg 11.5, Hct 35.9, BUN 40, glu 137     Pertinent Meds: Protonix, Colace, Zofran      Physical Findings:  - Appearance: alert&oriented   - GI function: denies symptoms, last BM- 7/3 per pt. states unsure  - Tubes: no tubes noted  - Oral/Mouth cavity: reports ageusia   - Skin: intact (BS 17)     Nutrition Requirements (adjusted from RD note on 7/1)  Weight Used: dosing 64kg     Estimated Energy Needs    Continue []  Adjust [x] 1159-1256kcal (MSJ x 1.2-1.3 AF)  Adjusted Energy Recommendations:   kcal/day        Estimated Protein Needs    Continue []  Adjust [x] 64-76g (1-1.2g/kg CBW)  Adjusted Protein Recommendations:   gm/day        Estimated Fluid Needs        Continue []  Adjust [x] 1ml/kcal or per LIP  Adjusted Fluid Recommendations:   mL/day     Nutrient Intake: pt. reports <50% PO at meals        [] Previous Nutrition Diagnosis: Inadequate protein energy intake            [x] Ongoing          [] Resolved      Nutrition Intervention: meals and snacks, medical food supplement     Rec: continue dysphagia 3 soft nectar consistency liquid diet       Goal/Expected Outcome: In 3 days pt. to consume at least 50-75% PO and supplement      Indicator/Monitoring: energy intake, body composition, NFPF

## 2018-07-04 NOTE — PROGRESS NOTE ADULT - ASSESSMENT
89 yo female with resolving left sialoadenitis  neck and FOM swelling significantly improved    pt has almost baseline vocal quality, able to tolerate secretions and po fluids and solids    plan  -taper decadron  -abx  - outpatient follow up to remove sialolith

## 2018-07-04 NOTE — PROVIDER CONTACT NOTE (OTHER) - ACTION/TREATMENT ORDERED:
As per MD Access to be obtained by ultrasound
as per MD access to be placed via ultrasound
as per MD patients fluids to be held at this time and BP reassessed.

## 2018-07-04 NOTE — PROGRESS NOTE ADULT - SUBJECTIVE AND OBJECTIVE BOX
89 yo female with resolving left sialoadenitis  no events noted over night  pt seen and examined at bedside  denies neck pain, difficulty swallowing, vocal quality much improved    Vital Signs Last 24 Hrs  T(C): 36.8 (04 Jul 2018 05:30), Max: 36.8 (04 Jul 2018 05:30)  T(F): 98.3 (04 Jul 2018 05:30), Max: 98.3 (04 Jul 2018 05:30)  HR: 61 (03 Jul 2018 20:22) (58 - 65)  BP: 170/80 (04 Jul 2018 07:48) (143/72 - 177/81)  BP(mean): 90 (03 Jul 2018 10:46) (90 - 90)  RR: 18 (03 Jul 2018 20:22) (18 - 25)  SpO2: 96% (03 Jul 2018 22:04) (96% - 98%)    a+ox3, nad, slightly hoarse voice, no drooling  nc/at, perrl  neck - cellulitis resolved, left SM swelling significantly better, minimal induration  oc - no FOM swelling, tongue wnl, uvula midline                          11.5   7.57  )-----------( 275      ( 04 Jul 2018 06:50 )             35.9

## 2018-07-04 NOTE — PROGRESS NOTE ADULT - SUBJECTIVE AND OBJECTIVE BOX
Patient was seen and examined. Spoke with RN. Chart reviewed.  No events overnight.  Vital Signs Last 24 Hrs  T(F): 98.3 (04 Jul 2018 05:30), Max: 98.3 (04 Jul 2018 05:30)  HR: 61 (03 Jul 2018 20:22) (58 - 88)  BP: 170/80 (04 Jul 2018 07:48) (114/85 - 177/81)  SpO2: 96% (03 Jul 2018 22:04) (96% - 98%)  MEDICATIONS  (STANDING):  ampicillin/sulbactam  IVPB 3 Gram(s) IV Intermittent every 6 hours  clindamycin IVPB 600 milliGRAM(s) IV Intermittent every 8 hours  dexamethasone  Injectable 4 milliGRAM(s) IV Push every 8 hours  dextrose 5%. 1000 milliLiter(s) (125 mL/Hr) IV Continuous <Continuous>  docusate sodium 100 milliGRAM(s) Oral three times a day  enoxaparin Injectable 40 milliGRAM(s) SubCutaneous every 24 hours  pantoprazole    Tablet 40 milliGRAM(s) Oral before breakfast  sodium chloride 0.9%. 1000 milliLiter(s) (75 mL/Hr) IV Continuous <Continuous>    MEDICATIONS  (PRN):  aluminum hydroxide/magnesium hydroxide/simethicone Suspension 30 milliLiter(s) Oral every 4 hours PRN Dyspepsia  ibuprofen  Tablet 400 milliGRAM(s) Oral every 6 hours PRN Mild pain  ondansetron Injectable 4 milliGRAM(s) IV Push every 6 hours PRN Nausea  senna 2 Tablet(s) Oral at bedtime PRN Constipation    Labs:                        11.5   7.57  )-----------( 275      ( 04 Jul 2018 06:50 )             35.9                         10.5   7.39  )-----------( 246      ( 03 Jul 2018 04:45 )             34.0     03 Jul 2018 17:48    142    |  103    |  40     ----------------------------<  137    4.8     |  24     |  0.8    03 Jul 2018 04:45    147    |  111    |  43     ----------------------------<  142    4.5     |  22     |  0.9      Ca    8.7        03 Jul 2018 17:48  Ca    8.4        03 Jul 2018 04:45      PT/INR - ( 04 Jul 2018 06:50 )   PT: 12.00 sec;   INR: 1.11 ratio         PTT - ( 04 Jul 2018 06:50 )  PTT:27.3 sec      General: uncomfortable  Neurology: A&Ox3, nonfocal  Head:  Normocephalic, atraumatic  ENT:  Mucosa moist, no ulcerations  Neck:  Supple, no JVD, purple outline  Skin: no breakdowns (as per RN)  Resp: CTA B/L  CV: RRR, S1S2,   GI: Soft, NT, bowel sounds  MS: No edema, + peripheral pulses, FROM all 4 extremity      A/P:  Pt is a 89 y.o female with sialoadenitis 2* sialolith, extubatedand transferrd to floor   Pt still c/o pain in throat and odynophagia, and weakness. PT overall feels much better - swelling has decreased.  89 y.o female with sialoadenitis 2* sialolith - clinically improving.    ·	cont abx- ID f/u for duration  ·	can wean decadron  ·	soft diet/full liquid as tolerated  if swelling continues to go down, may be able to remove stone as IP, otherwise pt will f/u OP   -ENT f/u    PT/rehab    OOB to chair    DVT prophylaxis  Decubitus prevention- all measures as per RN protocol  Please call or text me with any questions or updates

## 2018-07-05 ENCOUNTER — TRANSCRIPTION ENCOUNTER (OUTPATIENT)
Age: 83
End: 2018-07-05

## 2018-07-05 RX ADMIN — AMPICILLIN SODIUM AND SULBACTAM SODIUM 200 GRAM(S): 250; 125 INJECTION, POWDER, FOR SUSPENSION INTRAMUSCULAR; INTRAVENOUS at 05:00

## 2018-07-05 RX ADMIN — Medication 100 MILLIGRAM(S): at 05:51

## 2018-07-05 RX ADMIN — Medication 40 MILLIGRAM(S): at 18:02

## 2018-07-05 RX ADMIN — Medication 100 MILLIGRAM(S): at 05:52

## 2018-07-05 RX ADMIN — AMPICILLIN SODIUM AND SULBACTAM SODIUM 200 GRAM(S): 250; 125 INJECTION, POWDER, FOR SUSPENSION INTRAMUSCULAR; INTRAVENOUS at 11:46

## 2018-07-05 RX ADMIN — PANTOPRAZOLE SODIUM 40 MILLIGRAM(S): 20 TABLET, DELAYED RELEASE ORAL at 05:54

## 2018-07-05 RX ADMIN — Medication 4 MILLIGRAM(S): at 05:51

## 2018-07-05 RX ADMIN — Medication 1 TABLET(S): at 18:02

## 2018-07-05 RX ADMIN — ENOXAPARIN SODIUM 40 MILLIGRAM(S): 100 INJECTION SUBCUTANEOUS at 11:47

## 2018-07-05 NOTE — DISCHARGE NOTE ADULT - CARE PROVIDERS DIRECT ADDRESSES
,[email or web address],DirectAddress_Unknown [email or web address],DirectAddress_Unknown,DirectAddress_Unknown

## 2018-07-05 NOTE — PROGRESS NOTE ADULT - ASSESSMENT
89y old Female with L sialoadenitis 2* obstructing sialolith -- clinically improving.    PLAN:  1.	Cont abx  2.	Taper decadron  3.	Outpt f/u in 1 week with Dr. Campos

## 2018-07-05 NOTE — DISCHARGE NOTE ADULT - CARE PLAN
Principal Discharge DX:	Sialadenitis  Goal:	Removal of Stone as Outpatient  Assessment and plan of treatment:	Prednisone 40 mg for 5 days. Remove the stone outpatient. Principal Discharge DX:	Sialadenitis  Goal:	Medical Management and follow up for stone removal  Assessment and plan of treatment:	You have been intubated on the 30th of June for severe upper airway obstruction secondary to inflammation of your salivary glands due to a stone. You were started on IV antibiotics and you required no hemodynamic support with a central line. You were also started on steroids to decrease the upper airway inflammation after ENT and Infectious Disease were consulted.  Prednisone taper over the next 4 days. Follow up with ENT for removal of the stone as outpatient.  Secondary Diagnosis:	PUD (peptic ulcer disease)  Goal:	Medical Management  Assessment and plan of treatment:	Resume your current medication and follow up with your PMD  Secondary Diagnosis:	Chronic obstructive pulmonary disease  Goal:	Medical managmenet  Assessment and plan of treatment:	You are currently not on any medications and you are currently asymptomatic. Follow up with your Pulmonologist for further recommendations regarding your COPD.  Secondary Diagnosis:	Urinary incontinence  Goal:	Follow up  Assessment and plan of treatment:	Resume proper hygiene until you follow up with your Primary Care Doctor for further recommendations.

## 2018-07-05 NOTE — PROGRESS NOTE ADULT - SUBJECTIVE AND OBJECTIVE BOX
SUBJECTIVE:    Patient is a 89y old Female who presents with a chief complaint of Left sided facial swelling , difficulty swallowing and muffled voice for 2 days that is currently improving. (2018 09:06)    Currently admitted to medicine with the primary diagnosis of Salivary gland abscess/Saladenitis     Today is hospital day 6d. This morning she is resting comfortably in bed and reports no new issues or overnight events.     PAST MEDICAL & SURGICAL HISTORY  Osteoarthritis  Diverticulosis  Hiatal hernia  PUD (peptic ulcer disease)  Chronic obstructive pulmonary disease  S/P laparoscopic-assisted sigmoidectomy    SOCIAL HISTORY:  Negative for smoking/alcohol/drug use.     ALLERGIES:  No Known Allergies    MEDICATIONS:  STANDING MEDICATIONS  ampicillin/sulbactam  IVPB 3 Gram(s) IV Intermittent every 6 hours  clindamycin IVPB 600 milliGRAM(s) IV Intermittent every 8 hours  dexamethasone  Injectable 4 milliGRAM(s) IV Push every 8 hours  docusate sodium 100 milliGRAM(s) Oral three times a day  enoxaparin Injectable 40 milliGRAM(s) SubCutaneous every 24 hours  melatonin 3 milliGRAM(s) Oral at bedtime  pantoprazole    Tablet 40 milliGRAM(s) Oral before breakfast    PRN MEDICATIONS  aluminum hydroxide/magnesium hydroxide/simethicone Suspension 30 milliLiter(s) Oral every 4 hours PRN  ibuprofen  Tablet 400 milliGRAM(s) Oral every 6 hours PRN  ondansetron Injectable 4 milliGRAM(s) IV Push every 6 hours PRN  senna 2 Tablet(s) Oral at bedtime PRN    VITALS:   T(F): 96.6  HR: 57  BP: 172/77  RR: 20  SpO2: 97%    LABS:                        11.5   7.57  )-----------( 275      ( 2018 06:50 )             35.9     07-04    140  |  100  |  34<H>  ----------------------------<  137<H>  4.4   |  24  |  0.7    Ca    8.8      2018 06:50  Mg     2.4     07-04    TPro  6.4  /  Alb  3.5  /  TBili  0.6  /  DBili  x   /  AST  12  /  ALT  10  /  AlkPhos  43  07-04    PT/INR - ( 2018 06:50 )   PT: 12.00 sec;   INR: 1.11 ratio         PTT - ( 2018 06:50 )  PTT:27.3 sec  Urinalysis Basic - ( 2018 16:35 )    Color: Yellow / Appearance: Clear / S.015 / pH: x  Gluc: x / Ketone: Negative  / Bili: Negative / Urobili: 0.2 mg/dL   Blood: x / Protein: Negative mg/dL / Nitrite: Negative   Leuk Esterase: Negative / RBC: x / WBC x   Sq Epi: x / Non Sq Epi: x / Bacteria: x                RADIOLOGY:    PHYSICAL EXAM:  GEN: No acute distress  LUNGS: Clear to auscultation bilaterally   HEART: S1/S2 present. RRR.   ABD: Soft, non-tender, non-distended. Bowel sounds present  EXT: NC/NC/NE/2+PP/BIRCH  NEURO: AAOX3 SUBJECTIVE:    Patient is a 89y old Female who presents with a chief complaint of Left sided facial swelling , difficulty swallowing and muffled voice for 2 days that is currently improving. (2018 09:06)    Currently admitted to medicine with the primary diagnosis of Salivary gland abscess/Saladenitis     Today is hospital day 6d. This morning she is resting comfortably in bed and reports no new issues or overnight events.     PAST MEDICAL & SURGICAL HISTORY  Osteoarthritis  Diverticulosis  Hiatal hernia  PUD (peptic ulcer disease)  Chronic obstructive pulmonary disease  S/P laparoscopic-assisted sigmoidectomy    SOCIAL HISTORY:  Negative for smoking/alcohol/drug use.     ALLERGIES:  No Known Allergies    MEDICATIONS:  STANDING MEDICATIONS  ampicillin/sulbactam  IVPB 3 Gram(s) IV Intermittent every 6 hours  clindamycin IVPB 600 milliGRAM(s) IV Intermittent every 8 hours  prednisone 40 mg for 5 days.  docusate sodium 100 milliGRAM(s) Oral three times a day  enoxaparin Injectable 40 milliGRAM(s) SubCutaneous every 24 hours  melatonin 3 milliGRAM(s) Oral at bedtime  pantoprazole    Tablet 40 milliGRAM(s) Oral before breakfast    PRN MEDICATIONS  aluminum hydroxide/magnesium hydroxide/simethicone Suspension 30 milliLiter(s) Oral every 4 hours PRN  ibuprofen  Tablet 400 milliGRAM(s) Oral every 6 hours PRN  ondansetron Injectable 4 milliGRAM(s) IV Push every 6 hours PRN  senna 2 Tablet(s) Oral at bedtime PRN    VITALS:   T(F): 96.6  HR: 57  BP: 172/77  RR: 20  SpO2: 97%    LABS:                        11.5   7.57  )-----------( 275      ( 2018 06:50 )             35.9     07-04    140  |  100  |  34<H>  ----------------------------<  137<H>  4.4   |  24  |  0.7    Ca    8.8      2018 06:50  Mg     2.4     07-04    TPro  6.4  /  Alb  3.5  /  TBili  0.6  /  DBili  x   /  AST  12  /  ALT  10  /  AlkPhos  43  07-04    PT/INR - ( 2018 06:50 )   PT: 12.00 sec;   INR: 1.11 ratio         PTT - ( 2018 06:50 )  PTT:27.3 sec  Urinalysis Basic - ( 2018 16:35 )    Color: Yellow / Appearance: Clear / S.015 / pH: x  Gluc: x / Ketone: Negative  / Bili: Negative / Urobili: 0.2 mg/dL   Blood: x / Protein: Negative mg/dL / Nitrite: Negative   Leuk Esterase: Negative / RBC: x / WBC x   Sq Epi: x / Non Sq Epi: x / Bacteria: x                RADIOLOGY:    PHYSICAL EXAM:  GEN: No acute distress  LUNGS: Clear to auscultation bilaterally   HEART: S1/S2 present. RRR.   ABD: Soft, non-tender, non-distended. Bowel sounds present  EXT: NC/NC/NE/2+PP/BIRCH  NEURO: AAOX3

## 2018-07-05 NOTE — CONSULT NOTE ADULT - SUBJECTIVE AND OBJECTIVE BOX
HPI:  89 y o f with pmh  of COPD not on home O2 , hiatal hernia , OA , diverticulosis ,and PUD s/p UGIB in 2017 presented to ER with c/o left sided facial swelling , difficulty swallowing and muffled voice for 2 days. [place] and was transferred to [facility] for ENT evaluation as Ct neck showed prelim findings of peritonsillar abscess. Pt reports that she felt left sided facial swelling 2 days ago and noticed change in her voice quality. Pt also reports pain while swallowing to a point that pt is even unable to swallow her saliva. Pt denies any fever , chills , nausea , vomiting , cough or sob . Pt denies any recent dental interventions, any trauma to face.      In ER pt was found to have left sided facial swelling , Ct neck prelim findings were consistent with peritonsillar abscess, Pt was seen by ENT Pa and laryngoscope showed diffuse swelling of left side of mouth , swelling of epiglottis with secretions. Pt is saturating > 90% on room air , but will need to upgraded to ICU for impending airway compromise. (2018 09:06)      PAST MEDICAL & SURGICAL HISTORY:  Osteoarthritis  Diverticulosis  Hiatal hernia  PUD (peptic ulcer disease)  Chronic obstructive pulmonary disease  S/P laparoscopic-assisted sigmoidectomy      Hospital Course:  She is deconditioned and weak. legs are wobbly. Hospitalized with fascial abscess. She may need a procedure on Monday. At baseline she used a cane; she has bilateral knee OA which are severe.  TODAY'S SUBJECTIVE & REVIEW OF SYMPTOMS:     Constitutional WNL   Cardio WNL   Resp WNL   GI WNL  Heme WNL  Endo WNL  Skin WNL  MSK WNL  Neuro WNL  Cognitive WNL  Psych WNL      MEDICATIONS  (STANDING):  ampicillin/sulbactam  IVPB 3 Gram(s) IV Intermittent every 6 hours  clindamycin IVPB 600 milliGRAM(s) IV Intermittent every 8 hours  dexamethasone  Injectable 4 milliGRAM(s) IV Push every 8 hours  docusate sodium 100 milliGRAM(s) Oral three times a day  enoxaparin Injectable 40 milliGRAM(s) SubCutaneous every 24 hours  melatonin 3 milliGRAM(s) Oral at bedtime  pantoprazole    Tablet 40 milliGRAM(s) Oral before breakfast    MEDICATIONS  (PRN):  aluminum hydroxide/magnesium hydroxide/simethicone Suspension 30 milliLiter(s) Oral every 4 hours PRN Dyspepsia  ibuprofen  Tablet 400 milliGRAM(s) Oral every 6 hours PRN Mild pain  ondansetron Injectable 4 milliGRAM(s) IV Push every 6 hours PRN Nausea  senna 2 Tablet(s) Oral at bedtime PRN Constipation      FAMILY HISTORY:  No pertinent family history in first degree relatives      Allergies    No Known Allergies    Intolerances        SOCIAL HISTORY:    [  ] Etoh  [  ] Smoking  [  ] Substance abuse     Home Environment:  [  ] Home Alone  [x  ] Lives with Family-  [  ] Home Health Aid    Dwelling:  [  ] Apartment  [  ] Private House  [  ] Adult Home  [  ] Skilled Nursing Facility      [  ] Short Term  [  ] Long Term  [  ] Stairs       Elevator [  ]    FUNCTIONAL STATUS PTA: (Check all that apply)  Ambulation: [   ]Independent    [  ] Dependent     [  ] Non-Ambulatory  Assistive Device: [  ] SA Cane  [  ]  Q Cane  [  ] Walker  [  ]  Wheelchair  ADL : [  ] Independent  [  ]  Dependent       Vital Signs Last 24 Hrs  T(C): 36.3 (2018 06:39), Max: 36.3 (2018 06:39)  T(F): 97.3 (2018 06:39), Max: 97.3 (2018 06:39)  HR: 57 (2018 06:39) (51 - 57)  BP: 142/66 (2018 06:39) (142/66 - 166/67)  BP(mean): --  RR: 18 (2018 06:39) (18 - 18)  SpO2: 97% (2018 07:45) (97% - 97%)      PHYSICAL EXAM: Alert & Oriented X3  GENERAL: NAD, well-groomed, well-developed  HEAD:  Atraumatic, Normocephalic  EYES: EOMI, PERRLA, conjunctiva and sclera clear  NECK: Supple, No JVD, Normal thyroid  CHEST/LUNG: Clear to percussion bilaterally; No rales, rhonchi, wheezing, or rubs  HEART: Regular rate and rhythm; No murmurs, rubs, or gallops  ABDOMEN: Soft, Nontender, Nondistended; Bowel sounds present  EXTREMITIES:  2+ Peripheral Pulses, No clubbing, cyanosis, or edema    NERVOUS SYSTEM:  Cranial Nerves 2-12 intact [  ] Abnormal  [  ]  ROM: WFL all extremities [  ]  Abnormal [  ] crepitus knees  Motor Strength: WFL all extremities  [  ]  Abnormal [ x ]4+/5 LE's  Sensation: intact to light touch [  ] Abnormal [  ]  Reflexes: Symmetric [  ]  Abnormal [  ]    FUNCTIONAL STATUS:  Bed Mobility: Independent [  ]  Supervision [  ]  Needs Assistance [  ]  N/A [  ]  Transfers: Independent [  ]  Supervision [  ]  Needs Assistance [  ]  N/A [  ]   Ambulation: Independent [  ]  Supervision [  ]  Needs Assistance [  ]  N/A [  ]  ADL: Independent [  ] Requires Assistance [  ] N/A [  ]      LABS:                        11.5   7.57  )-----------( 275      ( 2018 06:50 )             35.9     07-04    140  |  100  |  34<H>  ----------------------------<  137<H>  4.4   |  24  |  0.7    Ca    8.8      2018 06:50  Mg     2.4     07-04    TPro  6.4  /  Alb  3.5  /  TBili  0.6  /  DBili  x   /  AST  12  /  ALT  10  /  AlkPhos  43  07-04    PT/INR - ( 2018 06:50 )   PT: 12.00 sec;   INR: 1.11 ratio         PTT - ( 2018 06:50 )  PTT:27.3 sec  Urinalysis Basic - ( 2018 16:35 )    Color: Yellow / Appearance: Clear / S.015 / pH: x  Gluc: x / Ketone: Negative  / Bili: Negative / Urobili: 0.2 mg/dL   Blood: x / Protein: Negative mg/dL / Nitrite: Negative   Leuk Esterase: Negative / RBC: x / WBC x   Sq Epi: x / Non Sq Epi: x / Bacteria: x        RADIOLOGY & ADDITIONAL STUDIES:    Assesment:

## 2018-07-05 NOTE — DISCHARGE NOTE ADULT - PLAN OF CARE
Removal of Stone as Outpatient Prednisone 40 mg for 5 days. Remove the stone outpatient. Resume your current medication and follow up with your PMD Medical managmenet You are currently not on any medications and you are currently asymptomatic. Follow up with your Pulmonologist for further recommendations regarding your COPD. Follow up Resume proper hygiene until you follow up with your Primary Care Doctor for further recommendations. Medical Management and follow up for stone removal You have been intubated on the 30th of June for severe upper airway obstruction secondary to inflammation of your salivary glands due to a stone. You were started on IV antibiotics and you required no hemodynamic support with a central line. You were also started on steroids to decrease the upper airway inflammation after ENT and Infectious Disease were consulted.  Prednisone taper over the next 4 days. Follow up with ENT for removal of the stone as outpatient. Medical Management

## 2018-07-05 NOTE — DISCHARGE NOTE ADULT - HOSPITAL COURSE
89F presented for left sided facial swelling, difficulty swallowing and muffled voice for 2 days and admitted for sialadenitis. Patient was intubated for airway protection due to swelling, and started on IV abx as well as steroids with good response and then extubated. CT Head andneck showed submandibular gland swelling and submandibular duct stone. Patient now stable for discharge on PO abx and steroids. 89F presented for left sided facial swelling, difficulty swallowing and muffled voice for 2 days and admitted for sialadenitis. Patient was intubated for airway protection due to swelling, and started on IV abx as well as steroids with good response and then extubated. CT Head andneck showed submandibular gland swelling and submandibular duct stone. Patient now stable for discharge on PO abx and steroids. Steroids will be tapered down and she will be sent on PO Augmentin as per ID. ENT will follow up with outpatient for stone removal after 10 days course of Augmentin is completed due to duct manipulation.

## 2018-07-05 NOTE — PHYSICAL THERAPY INITIAL EVALUATION ADULT - GENERAL OBSERVATIONS, REHAB EVAL
13:30-14:15 Pt encountered sitting in chair in NAD, +O2 2 LPM NC, +IV lock, spouse and son at b/s, Pt agreeable to PT.

## 2018-07-05 NOTE — DISCHARGE NOTE ADULT - PATIENT PORTAL LINK FT
You can access the [facility] Patient Portal, offered by [facility], by registering with the following website: [email or web address]

## 2018-07-05 NOTE — PROGRESS NOTE ADULT - ASSESSMENT
#Left Saladenitis  -CT evidence of salivary stone  - switch IV Unasyn and Clindamycin to PO  -Mya Dexamethasone: 2mg PO q 12 then the following 2 day 2mg once a day.  -IV hydration  -ENT f/u out pt for salivary stone management      #Urinary incontinence  - sxs of Urgency and Dysuria  -negative UA r/o infection    #PUD  -cont with IV protonix    #COPD  stable not wheezing    #DVT  -ppx with heparin #Left Saladenitis  -CT evidence of salivary stone  - switch IV Unasyn and Clindamycin to PO  -Mya Dexamethasone: 2mg PO q 12 then the following 2 day 2mg once a day.  -IV hydration  -ENT f/u out pt for salivary stone management      #Urinary incontinence  - sxs of Urgency and Dysuria  -negative UA r/o infection  - Bladder Scan negative  - Outpatient follow up.    #PUD  -cont with IV protonix    #COPD  stable not wheezing    #DVT  -ppx with heparin

## 2018-07-05 NOTE — PROGRESS NOTE ADULT - SUBJECTIVE AND OBJECTIVE BOX
JANINE SNOW  89y, Female      OVERNIGHT EVENTS:    no fevers, feels well. No complaints    VITALS:  T(F): 97.3, Max: 97.3 (18 @ 06:39)  HR: 57  BP: 142/66  RR: 18Vital Signs Last 24 Hrs  T(C): 36.3 (2018 06:39), Max: 36.3 (2018 06:39)  T(F): 97.3 (2018 06:39), Max: 97.3 (2018 06:39)  HR: 57 (2018 06:39) (51 - 57)  BP: 142/66 (2018 06:39) (142/66 - 166/67)  BP(mean): --  RR: 18 (2018 06:39) (18 - 18)  SpO2: --    TESTS & MEASUREMENTS:                        11.5   7.57  )-----------( 275      ( 2018 06:50 )             35.9     07-    140  |  100  |  34<H>  ----------------------------<  137<H>  4.4   |  24  |  0.7    Ca    8.8      2018 06:50  Mg     2.4     07-04    TPro  6.4  /  Alb  3.5  /  TBili  0.6  /  DBili  x   /  AST  12  /  ALT  10  /  AlkPhos  43  07-04    LIVER FUNCTIONS - ( 2018 06:50 )  Alb: 3.5 g/dL / Pro: 6.4 g/dL / ALK PHOS: 43 U/L / ALT: 10 U/L / AST: 12 U/L / GGT: x             Culture - Blood (collected 18 @ 04:47)  Source: .Blood None  Preliminary Report (18 @ 16:01):    No growth to date.      Urinalysis Basic - ( 2018 16:35 )    Color: Yellow / Appearance: Clear / S.015 / pH: x  Gluc: x / Ketone: Negative  / Bili: Negative / Urobili: 0.2 mg/dL   Blood: x / Protein: Negative mg/dL / Nitrite: Negative   Leuk Esterase: Negative / RBC: x / WBC x   Sq Epi: x / Non Sq Epi: x / Bacteria: x          RADIOLOGY & ADDITIONAL TESTS:    ANTIBIOTICS:  ampicillin/sulbactam  IVPB 3 Gram(s) IV Intermittent every 6 hours  clindamycin IVPB 600 milliGRAM(s) IV Intermittent every 8 hours

## 2018-07-05 NOTE — DISCHARGE NOTE ADULT - OTHER SIGNIFICANT FINDINGS
CT Neck Soft Tissue w/ IV Cont (06.29.18 @ 19:41)  Left submandibular gland sialoadenitis with a left submandibular duct   stone. Extensive inflammatory changes within the left side of the neck,   left floor of mouth, and left pharynx.

## 2018-07-05 NOTE — PROGRESS NOTE ADULT - SUBJECTIVE AND OBJECTIVE BOX
Patient is a 89y old Female with L sialoadenitis 2* obstructing sialolith. Patient seen and examined at bedside. No events overnight. Feels significantly better since admission. Dipika soft diet.    VS: T(F): 97.3, Max: 97.3 (18 @ 06:39)  HR: 57 (51 - 57)  BP: 142/66 (142/66 - 166/67)  RR: 18  GEN: Awake, alert, NAD. No drooling. Tolerating secretions.  HEENT: FOM soft, NT, no elevation, neck without erythema, mild swelling/TTP L SM gland, neck supple, posterior OP pink, uvula midline    LABS/IMAGIN.5   7.57  )-----------( 275      ( 2018 06:50 )             35.9     07-04    140  |  100  |  34<H>  ----------------------------<  137<H>  4.4   |  24  |  0.7    Ca    8.8      2018 06:50  Mg     2.4     07-04    TPro  6.4  /  Alb  3.5  /  TBili  0.6  /  DBili  x   /  AST  12  /  ALT  10  /  AlkPhos  43  07-04    Urinalysis Basic - ( 2018 16:35 )  Color: Yellow / Appearance: Clear / S.015 / pH: x  Gluc: x / Ketone: Negative  / Bili: Negative / Urobili: 0.2 mg/dL   Blood: x / Protein: Negative mg/dL / Nitrite: Negative   Leuk Esterase: Negative / RBC: x / WBC x   Sq Epi: x / Non Sq Epi: x / Bacteria: x

## 2018-07-05 NOTE — PROGRESS NOTE ADULT - ASSESSMENT
- IMPRESSION    L Sialadenitis/ upper airway obstruction/ better s/p extubation  COPD  PUD  better    PLAN:    - taper and dc steroids, prednisone 40 for 5 days  - check pox ra  - recall as needed

## 2018-07-05 NOTE — DISCHARGE NOTE ADULT - PROVIDER TOKENS
TOKEN:'[id number]',FREE:[LAST:[lee],FIRST:[jeevan],PHONE:[phone]:[(   )    -]] TOKEN:'[id number]',FREE:[LAST:[lee],FIRST:[jeevan],PHONE:[phone]],FAX:[(   )    -]],TOKEN:'[id number]'

## 2018-07-05 NOTE — PROGRESS NOTE ADULT - ASSESSMENT
IMPRESSION:  Left sialadenitis which on PE is better.  Left peritonsillarly abscess.  Clinically asymptomatic.    RECOMMENDATIONS:  D/C iv antibiotics.  Po Augmentin 875 mg q12h for 14 more days.    Recall prn please.

## 2018-07-05 NOTE — PHYSICAL THERAPY INITIAL EVALUATION ADULT - TINETTI BALANCE TEST, REHAB EVAL
Total score 12/28, Based on this exam, pt is high risk for falls. Total score 12/28. Based on this exam, pt is high risk for falls.

## 2018-07-05 NOTE — DISCHARGE NOTE ADULT - ADDITIONAL INSTRUCTIONS
Needs outpatient followup with ENT with Dr. Resendez for possible stone removal.  Follow up with PMD within 1-2 weeks.  Consider urology appointment for incontinence. Needs outpatient follow-up with ENT with Dr. Resendez for possible stone removal.  Follow up with PMD within 1-2 weeks.

## 2018-07-05 NOTE — CONSULT NOTE ADULT - ASSESSMENT
89F with peritonsillar swelling and need for emergent intubation for compromised airway, surgery consult was placed in case emergent tracheostomy was needed    Patient is s/p successful intubation, no surgical intervention required at this point
IMPRESSION: Rehab of  debility secondary to fascial abscess; bilateral knee OA    PRECAUTIONS: [x  ] Cardiac  [  ] Respiratory  [  ] Seizures [  ] Contact Isolation  [  ] Droplet Isolation  [  ] Other    Weight Bearing Status:     RECOMMENDATION:    Out of Bed to Chair     DVT/Decubiti Prophylaxis    REHAB PLAN:     [xx   ] Bedside P/T 3-5 times a week   [   ]   Bedside O/T  2-3 times a week             [   ] No Rehab Therapy Indicated                   [   ]  Speech Therapy   Conditioning/ROM                                    ADL  Bed Mobility                                               Conditioning/ROM  Transfers                                                     Bed Mobility  Sitting /Standing Balance                         Transfers                                        Gait Training                                               Sitting/Standing Balance  Stair Training [   ]Applicable                    Home equipment Eval                                                                        Splinting  [   ] Only      GOALS:   ADL   [ x  ]   Independent                    Transfers  [  x ] Independent                          Ambulation  [ x  ] Independent     [ xx   ] With device                            [   ]  CG                                                         [   ]  CG                                                                  [   ] CG                            [    ] Min A                                                   [   ] Min A                                                              [   ] Min  A          DISCHARGE PLAN:   [   ]  Good candidate for Intensive Rehabilitation/Hospital based-4A [facility]                                             Will tolerate 3hrs Intensive Rehab Daily                                       [ xx   ]  Short Term Rehab in Skilled Nursing Facility                                       [    ]  Home with Outpatient or  services                                         [    ]  Possible Candidate for Intensive Hospital based Rehab
IMPRESSION:  Left sialadenitis.  Left peritonsillarly abscess.  Doubt acute epiglottitis    RECOMMENDATIONS:  Clindamycin 600 mg iv q8h.  Unasyn 3 gm iv q6h.  F/u with ENT

## 2018-07-05 NOTE — PROGRESS NOTE ADULT - SUBJECTIVE AND OBJECTIVE BOX
OVERNIGHT EVENTS: feels better, sitting on chair, comfortable    Vital Signs Last 24 Hrs  T(C): 36.3 (2018 06:39), Max: 36.3 (2018 06:39)  T(F): 97.3 (2018 06:39), Max: 97.3 (2018 06:39)  HR: 57 (2018 06:39) (51 - 57)  BP: 142/66 (2018 06:39) (142/66 - 166/67)  BP(mean): --  RR: 18 (2018 06:39) (18 - 18)  SpO2: 97% (2018 07:45) (97% - 97%)    PHYSICAL EXAMINATION:    GENERAL: The patient is awake and alert in no apparent distress.     HEENT: Head is normocephalic and atraumatic. Extraocular muscles are intact. Mucous membranes are moist.    NECK: Supple.    LUNGS: good air entry    HEART: Regular rate and rhythm without murmur.    ABDOMEN: Soft, nontender, and nondistended.      EXTREMITIES: Without any cyanosis, clubbing, rash, lesions or edema.    NEUROLOGIC: Grossly intact.    SKIN: No ulceration or induration present.      LABS:                        11.5   7.57  )-----------( 275      ( 2018 06:50 )             35.9     07-04    140  |  100  |  34<H>  ----------------------------<  137<H>  4.4   |  24  |  0.7    Ca    8.8      2018 06:50  Mg     2.4     07-04    TPro  6.4  /  Alb  3.5  /  TBili  0.6  /  DBili  x   /  AST  12  /  ALT  10  /  AlkPhos  43  07-04    PT/INR - ( 2018 06:50 )   PT: 12.00 sec;   INR: 1.11 ratio         PTT - ( 2018 06:50 )  PTT:27.3 sec  Urinalysis Basic - ( 2018 16:35 )    Color: Yellow / Appearance: Clear / S.015 / pH: x  Gluc: x / Ketone: Negative  / Bili: Negative / Urobili: 0.2 mg/dL   Blood: x / Protein: Negative mg/dL / Nitrite: Negative   Leuk Esterase: Negative / RBC: x / WBC x   Sq Epi: x / Non Sq Epi: x / Bacteria: x                        18 @ 07:01  -  18 @ 12:42  --------------------------------------------------------  IN: 100 mL / OUT: 0 mL / NET: 100 mL        MICROBIOLOGY:      MEDICATIONS  (STANDING):  ampicillin/sulbactam  IVPB 3 Gram(s) IV Intermittent every 6 hours  clindamycin IVPB 600 milliGRAM(s) IV Intermittent every 8 hours  dexamethasone  Injectable 4 milliGRAM(s) IV Push every 8 hours  docusate sodium 100 milliGRAM(s) Oral three times a day  enoxaparin Injectable 40 milliGRAM(s) SubCutaneous every 24 hours  melatonin 3 milliGRAM(s) Oral at bedtime  pantoprazole    Tablet 40 milliGRAM(s) Oral before breakfast    MEDICATIONS  (PRN):  aluminum hydroxide/magnesium hydroxide/simethicone Suspension 30 milliLiter(s) Oral every 4 hours PRN Dyspepsia  ibuprofen  Tablet 400 milliGRAM(s) Oral every 6 hours PRN Mild pain  ondansetron Injectable 4 milliGRAM(s) IV Push every 6 hours PRN Nausea  senna 2 Tablet(s) Oral at bedtime PRN Constipation      RADIOLOGY & ADDITIONAL STUDIES:

## 2018-07-05 NOTE — PHYSICAL THERAPY INITIAL EVALUATION ADULT - ADDITIONAL COMMENTS
Pt primarily uses straight cane for indoor/outdoor ambulation, however uses rollator with long distances. Pt primarily uses SC for indoor/outdoor ambulation. However uses rollator with long distances.

## 2018-07-05 NOTE — DISCHARGE NOTE ADULT - CARE PROVIDER_API CALL
Sami Campos), Otolaryngology  [address] [place] [address]  Phone: [phone]  Fax: [phone]    jeevan howard  Phone: [phone]  Fax: (       - Sami Campos), Otolaryngology  [address] [facility]  [address] [place] [address]  Phone: [phone]  Fax: [phone]    jeevan howard  Phone: [phone]  Fax: (   )    -    Medhat Brewster), Critical Care Medicine; Internal Medicine; Pulmonary Disease; Sleep Medicine  [address] [place] [address]  Phone: [phone]  Fax: [phone]

## 2018-07-05 NOTE — PROGRESS NOTE ADULT - SUBJECTIVE AND OBJECTIVE BOX
Patient was seen and examined. Spoke with RN. Chart reviewed.  No events overnight. Feeling much better today  Vital Signs Last 24 Hrs  T(F): 97.3 (2018 06:39), Max: 97.3 (2018 06:39)  HR: 57 (2018 06:39) (51 - 57)  BP: 142/66 (2018 06:39) (142/66 - 166/67)  SpO2: --  MEDICATIONS  (STANDING):  ampicillin/sulbactam  IVPB 3 Gram(s) IV Intermittent every 6 hours  clindamycin IVPB 600 milliGRAM(s) IV Intermittent every 8 hours  dexamethasone  Injectable 4 milliGRAM(s) IV Push every 8 hours  docusate sodium 100 milliGRAM(s) Oral three times a day  enoxaparin Injectable 40 milliGRAM(s) SubCutaneous every 24 hours  melatonin 3 milliGRAM(s) Oral at bedtime  pantoprazole    Tablet 40 milliGRAM(s) Oral before breakfast    MEDICATIONS  (PRN):  aluminum hydroxide/magnesium hydroxide/simethicone Suspension 30 milliLiter(s) Oral every 4 hours PRN Dyspepsia  ibuprofen  Tablet 400 milliGRAM(s) Oral every 6 hours PRN Mild pain  ondansetron Injectable 4 milliGRAM(s) IV Push every 6 hours PRN Nausea  senna 2 Tablet(s) Oral at bedtime PRN Constipation    Labs:                        11.5   7.57  )-----------( 275      ( 2018 06:50 )             35.9     2018 06:50    140    |  100    |  34     ----------------------------<  137    4.4     |  24     |  0.7    2018 17:48    142    |  103    |  40     ----------------------------<  137    4.8     |  24     |  0.8      Ca    8.8        2018 06:50  Ca    8.7        2018 17:48  Mg     2.4       2018 06:50    TPro  6.4    /  Alb  3.5    /  TBili  0.6    /  DBili  x      /  AST  12     /  ALT  10     /  AlkPhos  43     2018 06:50    PT/INR - ( 2018 06:50 )   PT: 12.00 sec;   INR: 1.11 ratio         PTT - ( 2018 06:50 )  PTT:27.3 sec  Urinalysis Basic - ( 2018 16:35 )    Color: Yellow / Appearance: Clear / S.015 / pH: x  Gluc: x / Ketone: Negative  / Bili: Negative / Urobili: 0.2 mg/dL   Blood: x / Protein: Negative mg/dL / Nitrite: Negative   Leuk Esterase: Negative / RBC: x / WBC x   Sq Epi: x / Non Sq Epi: x / Bacteria: x        General: comfortable, NAD  Neurology: A&Ox3, nonfocal  Head:  Normocephalic, atraumatic  ENT:  Mucosa moist, no ulcerations  Neck:  Supple, no JVD,   Skin: no breakdowns (as per RN)  Resp: CTA B/L  CV: RRR, S1S2,   GI: Soft, NT, bowel sounds  MS: No edema, + peripheral pulses, FROM all 4 extremity      A/P:  89 y.o female with sialoadenitis 2* sialolith - clinically improving.    ·	cont abx- ID f/u for duration  ·	can wean decadron  ·	soft diet/full liquid as tolerated  if swelling continues to go down, may be able to remove stone as IP, otherwise pt will f/u OP   -ENT f/u    PT/rehab  OOB to chair    DC planning when cleared by ENT and ID- perhasp later today or tommorrow   DVT prophylaxis  Decubitus prevention- all measures as per RN protocol  Please call or text me with any questions or updates

## 2018-07-05 NOTE — PHYSICAL THERAPY INITIAL EVALUATION ADULT - LIVES WITH, PROFILE
Lives with spouse in  with ~5 AMANDA and L sided handrail, 1 flt inside however pt stays on 1st floor./spouse

## 2018-07-06 LAB
CULTURE RESULTS: SIGNIFICANT CHANGE UP
SPECIMEN SOURCE: SIGNIFICANT CHANGE UP

## 2018-07-06 PROCEDURE — 99233 SBSQ HOSP IP/OBS HIGH 50: CPT

## 2018-07-06 RX ORDER — HYDRALAZINE HCL 50 MG
25 TABLET ORAL ONCE
Refills: 0 | Status: COMPLETED | OUTPATIENT
Start: 2018-07-06 | End: 2018-07-06

## 2018-07-06 RX ADMIN — ENOXAPARIN SODIUM 40 MILLIGRAM(S): 100 INJECTION SUBCUTANEOUS at 11:07

## 2018-07-06 RX ADMIN — Medication 100 MILLIGRAM(S): at 05:15

## 2018-07-06 RX ADMIN — Medication 25 MILLIGRAM(S): at 09:16

## 2018-07-06 RX ADMIN — Medication 1 TABLET(S): at 05:15

## 2018-07-06 RX ADMIN — Medication 3 MILLIGRAM(S): at 22:12

## 2018-07-06 RX ADMIN — PANTOPRAZOLE SODIUM 40 MILLIGRAM(S): 20 TABLET, DELAYED RELEASE ORAL at 05:15

## 2018-07-06 RX ADMIN — Medication 40 MILLIGRAM(S): at 05:14

## 2018-07-06 RX ADMIN — Medication 1 TABLET(S): at 18:40

## 2018-07-06 NOTE — PROGRESS NOTE ADULT - SUBJECTIVE AND OBJECTIVE BOX
SUBJECTIVE:    Patient is a 89y old Female who presents with a chief complaint of Left sided facial swelling , difficulty swallowing and muffled voice for 2 days (2018 16:25)    Currently admitted to medicine with the primary diagnosis of Salivary gland abscess     Today is hospital day 7d. This morning she is resting comfortably in bed and reports no new issues or overnight events.     PAST MEDICAL & SURGICAL HISTORY  Osteoarthritis  Diverticulosis  Hiatal hernia  PUD (peptic ulcer disease)  Chronic obstructive pulmonary disease  S/P laparoscopic-assisted sigmoidectomy    SOCIAL HISTORY:  Negative for smoking/alcohol/drug use.     ALLERGIES:  No Known Allergies    MEDICATIONS:  STANDING MEDICATIONS  amoxicillin  875 milliGRAM(s)/clavulanate 1 Tablet(s) Oral two times a day  docusate sodium 100 milliGRAM(s) Oral three times a day  enoxaparin Injectable 40 milliGRAM(s) SubCutaneous every 24 hours  hydrALAZINE 25 milliGRAM(s) Oral once  melatonin 3 milliGRAM(s) Oral at bedtime  pantoprazole    Tablet 40 milliGRAM(s) Oral before breakfast  predniSONE   Tablet 40 milliGRAM(s) Oral daily    PRN MEDICATIONS  aluminum hydroxide/magnesium hydroxide/simethicone Suspension 30 milliLiter(s) Oral every 4 hours PRN  ibuprofen  Tablet 400 milliGRAM(s) Oral every 6 hours PRN  ondansetron Injectable 4 milliGRAM(s) IV Push every 6 hours PRN  senna 2 Tablet(s) Oral at bedtime PRN    VITALS:   T(F): 97.7  HR: 61  BP: 180/90  RR: 18  SpO2: 94%    LABS:            Urinalysis Basic - ( 2018 16:35 )    Color: Yellow / Appearance: Clear / S.015 / pH: x  Gluc: x / Ketone: Negative  / Bili: Negative / Urobili: 0.2 mg/dL   Blood: x / Protein: Negative mg/dL / Nitrite: Negative   Leuk Esterase: Negative / RBC: x / WBC x   Sq Epi: x / Non Sq Epi: x / Bacteria: x                RADIOLOGY:      PHYSICAL EXAM:  GEN: No acute distress  LUNGS: Clear to auscultation bilaterally   HEART: S1/S2 present. RRR.   ABD: Soft, non-tender, non-distended. Bowel sounds present  EXT: NC/NC/NE/2+PP/BIRCH  NEURO: AAOX3 SUBJECTIVE:    Patient is a 89y old Female who presents with a chief complaint of Left sided facial swelling , difficulty swallowing and muffled voice for 2 days (2018 16:25)    Currently admitted to medicine with the primary diagnosis of Salivary gland stone.     Today is hospital day 7d. This morning she is resting comfortably in bed and reports no new issues or overnight events.     PAST MEDICAL & SURGICAL HISTORY  Osteoarthritis  Diverticulosis  Hiatal hernia  PUD (peptic ulcer disease)  Chronic obstructive pulmonary disease  S/P laparoscopic-assisted sigmoidectomy    SOCIAL HISTORY:  Negative for smoking/alcohol/drug use.     ALLERGIES:  No Known Allergies    MEDICATIONS:  STANDING MEDICATIONS  amoxicillin  875 milliGRAM(s)/clavulanate 1 Tablet(s) Oral two times a day  docusate sodium 100 milliGRAM(s) Oral three times a day  enoxaparin Injectable 40 milliGRAM(s) SubCutaneous every 24 hours  hydrALAZINE 25 milliGRAM(s) Oral once  melatonin 3 milliGRAM(s) Oral at bedtime  pantoprazole    Tablet 40 milliGRAM(s) Oral before breakfast  predniSONE   Tablet 40 milliGRAM(s) Oral daily    PRN MEDICATIONS  aluminum hydroxide/magnesium hydroxide/simethicone Suspension 30 milliLiter(s) Oral every 4 hours PRN  ibuprofen  Tablet 400 milliGRAM(s) Oral every 6 hours PRN  ondansetron Injectable 4 milliGRAM(s) IV Push every 6 hours PRN  senna 2 Tablet(s) Oral at bedtime PRN    VITALS:   T(F): 97.7  HR: 61  BP: 180/90  RR: 18  SpO2: 94%    LABS:            Urinalysis Basic - ( 2018 16:35 )    Color: Yellow / Appearance: Clear / S.015 / pH: x  Gluc: x / Ketone: Negative  / Bili: Negative / Urobili: 0.2 mg/dL   Blood: x / Protein: Negative mg/dL / Nitrite: Negative   Leuk Esterase: Negative / RBC: x / WBC x   Sq Epi: x / Non Sq Epi: x / Bacteria: x                RADIOLOGY:      PHYSICAL EXAM:  GEN: No acute distress  LUNGS: Clear to auscultation bilaterally   HEART: S1/S2 present. RRR.   ABD: Soft, non-tender, non-distended. Bowel sounds present  EXT: NC/NC/NE/2+PP/BIRCH  NEURO: AAOX3

## 2018-07-06 NOTE — PROGRESS NOTE ADULT - ATTENDING COMMENTS
improving sialadenitis. stone still in canal, about 1/2 inch from papilla.   continue medical treatment, f/u with DR Campos as o/p
7/1abx / unasyn .cleocin///    f/u for possible  drainage   protonix

## 2018-07-06 NOTE — PROGRESS NOTE ADULT - SUBJECTIVE AND OBJECTIVE BOX
Patient was seen and examined. Spoke with RN. Chart reviewed.  No events overnight.  Vital Signs Last 24 Hrs  T(F): 97.7 (2018 06:00), Max: 97.7 (2018 06:00)  HR: 61 (2018 06:00) (57 - 62)  BP: 180/90 (2018 06:44) (169/96 - 186/74)  SpO2: 94% (2018 14:00) (94% - 94%)  MEDICATIONS  (STANDING):  amoxicillin  875 milliGRAM(s)/clavulanate 1 Tablet(s) Oral two times a day  docusate sodium 100 milliGRAM(s) Oral three times a day  enoxaparin Injectable 40 milliGRAM(s) SubCutaneous every 24 hours  hydrALAZINE 25 milliGRAM(s) Oral once  melatonin 3 milliGRAM(s) Oral at bedtime  pantoprazole    Tablet 40 milliGRAM(s) Oral before breakfast  predniSONE   Tablet 40 milliGRAM(s) Oral daily    MEDICATIONS  (PRN):  aluminum hydroxide/magnesium hydroxide/simethicone Suspension 30 milliLiter(s) Oral every 4 hours PRN Dyspepsia  ibuprofen  Tablet 400 milliGRAM(s) Oral every 6 hours PRN Mild pain  ondansetron Injectable 4 milliGRAM(s) IV Push every 6 hours PRN Nausea  senna 2 Tablet(s) Oral at bedtime PRN Constipation    Labs:            Urinalysis Basic - ( 2018 16:35 )    Color: Yellow / Appearance: Clear / S.015 / pH: x  Gluc: x / Ketone: Negative  / Bili: Negative / Urobili: 0.2 mg/dL   Blood: x / Protein: Negative mg/dL / Nitrite: Negative   Leuk Esterase: Negative / RBC: x / WBC x   Sq Epi: x / Non Sq Epi: x / Bacteria: x        General: comfortable, NAD  Neurology: A&Ox3, nonfocal  Head:  Normocephalic, atraumatic  ENT:  Mucosa moist, no ulcerations  Neck:  Supple, no JVD,   Skin: no breakdowns (as per RN)  Resp: CTA B/L  CV: RRR, S1S2,   GI: Soft, NT, bowel sounds  MS: No edema, + peripheral pulses, FROM all 4 extremity      A/P:  89y old Female with L sialoadenitis 2* obstructing sialolith -- clinically improving.    PO abx as per ID  Taper prednsione    pt's daughter to speak to ENT regarding timing of procedure- inpt vs oupt    DC when cleared by ENT    DVT prophylaxis  Decubitus prevention- all measures as per RN protocol  Please call or text me with any questions or updates

## 2018-07-06 NOTE — PROGRESS NOTE ADULT - SUBJECTIVE AND OBJECTIVE BOX
PT is a 89 y.o female with sialoadenitis 2* sialolith - supposed to go to SNF and continue PO abx at home and F/U as OP for removal of stone. Pt's daughter initially refusing this plan today, wanted to speak with ENT prior to her leaving. Daughter now at bedside, called by nursing to speak with her. Daughter was unaware of plan made on Tuesday (which was discussed with pts son,  and grandson). Explained to daughter that the stone has migrated and procedure to remove it may include dilation, which we do not routinely keep this equipment inpatient. Also, due to the severe infection pt had, it is safer to complete the antibiotic course before manipulating the stone and the duct. PT's daughter understands plan and ok with D/C to SNF and OP F/U. Pt's daughter given card with information and direct line to our  so she can schedule the appointment at her convenience after the antibiotic course complete.     Please recall with any questions. RN aware.

## 2018-07-06 NOTE — PROGRESS NOTE ADULT - ASSESSMENT
#Left Saladenitis  -CT evidence of salivary stone  - cont. Unasyn and Clindamycin to PO  -Mya Dexamethasone: 2mg PO q 12 then the following 2 day 2mg once a day.  -IV hydration  -ENT f/u out pt for salivary stone management      #Urinary incontinence  - sxs of Urgency and Dysuria  -negative UA r/o infection  - Bladder Scan negative  - Outpatient follow up.    #PUD  -cont with IV protonix    #COPD  stable not wheezing    #DVT  -ppx with heparin

## 2018-07-06 NOTE — PROGRESS NOTE ADULT - SUBJECTIVE AND OBJECTIVE BOX
Pt is a 89 y.o female with sialoadenitis 2* sialolith - seen and examined at bedside. Called by medical resident to come back to speak with family, as they now do not agree with outpatient follow up for stone removal. PT (with family present) on Tuesday was seen by Dr Campos, who explained the reasoning behind taking the stone out as OP (prefers to have full course of antibiotic, swelling to continue to decrease), and that this procedure is normally done under local anesthesia in the office. Pt and family were agreeable to plan and patient's info was given to our office  to contact them for follow up. Now - daughter refusing to take patient to SNF until stone removed. I went up to speak with the daughter at bedside, she had left. Pt's  and daughters  were at bedside and attempted to call her on her cell phone multiple times with no answer. Told the family and nurse to call me if they got in contact with the daughter.    Resident called again asking for plan after discussion - made resident aware that the daughter was not at bedside and was not able to be contacted, therefore I was unable to discuss plan with them. Medicine needs to clarify with family if they still want to go to SNF - cannot guarantee that stone will be able to be removed during this admission since the plan was OP F/U. IF patient still her, will have ENT attending come and speak to patient again.

## 2018-07-07 RX ADMIN — Medication 100 MILLIGRAM(S): at 13:01

## 2018-07-07 RX ADMIN — Medication 1 TABLET(S): at 17:11

## 2018-07-07 RX ADMIN — Medication 3 MILLIGRAM(S): at 21:35

## 2018-07-07 RX ADMIN — Medication 100 MILLIGRAM(S): at 05:11

## 2018-07-07 RX ADMIN — PANTOPRAZOLE SODIUM 40 MILLIGRAM(S): 20 TABLET, DELAYED RELEASE ORAL at 05:10

## 2018-07-07 RX ADMIN — ENOXAPARIN SODIUM 40 MILLIGRAM(S): 100 INJECTION SUBCUTANEOUS at 11:53

## 2018-07-07 RX ADMIN — Medication 1 TABLET(S): at 05:11

## 2018-07-07 RX ADMIN — Medication 40 MILLIGRAM(S): at 05:10

## 2018-07-07 NOTE — PROGRESS NOTE ADULT - SUBJECTIVE AND OBJECTIVE BOX
JANINE SNOW 89y Female  MRN#: [id number]     SUBJECTIVE  Patient is a 89y old Female who presents with a chief complaint of Left sided facial swelling , difficulty swallowing and muffled voice for 2 days (05 Jul 2018 16:25)  Currently admitted to medicine with the primary diagnosis of Sialadenitis.    OBJECTIVE  PAST MEDICAL & SURGICAL HISTORY  Osteoarthritis  Diverticulosis  Hiatal hernia  PUD (peptic ulcer disease)  Chronic obstructive pulmonary disease  S/P laparoscopic-assisted sigmoidectomy    ALLERGIES:  No Known Allergies    MEDICATIONS:  STANDING MEDICATIONS  amoxicillin  875 milliGRAM(s)/clavulanate 1 Tablet(s) Oral two times a day  docusate sodium 100 milliGRAM(s) Oral three times a day  enoxaparin Injectable 40 milliGRAM(s) SubCutaneous every 24 hours  melatonin 3 milliGRAM(s) Oral at bedtime  pantoprazole    Tablet 40 milliGRAM(s) Oral before breakfast  predniSONE   Tablet 40 milliGRAM(s) Oral daily    PRN MEDICATIONS  aluminum hydroxide/magnesium hydroxide/simethicone Suspension 30 milliLiter(s) Oral every 4 hours PRN  ibuprofen  Tablet 400 milliGRAM(s) Oral every 6 hours PRN  ondansetron Injectable 4 milliGRAM(s) IV Push every 6 hours PRN  senna 2 Tablet(s) Oral at bedtime PRN      VITAL SIGNS: Last 24 Hours  T(C): 35.7 (07 Jul 2018 20:40), Max: 35.9 (07 Jul 2018 05:00)  T(F): 96.3 (07 Jul 2018 20:40), Max: 96.7 (07 Jul 2018 05:00)  HR: 57 (07 Jul 2018 20:40) (56 - 77)  BP: 193/83 (07 Jul 2018 20:40) (137/78 - 193/83)  BP(mean): --  RR: 18 (07 Jul 2018 20:40) (18 - 18)  SpO2: --        PHYSICAL EXAM:    GENERAL: NAD, well-developed, AAOx3  HEENT:  Atraumatic, Normocephalic. EOMI, PERRLA, conjunctiva and sclera clear, No JVD  PULMONARY: Clear to auscultation bilaterally; No wheeze  CARDIOVASCULAR: Regular rate and rhythm; No murmurs, rubs, or gallops  GASTROINTESTINAL: Soft, Nontender, Nondistended; Bowel sounds present  MUSCULOSKELETAL:  2+ Peripheral Pulses, No clubbing, cyanosis, or edema  NEUROLOGY: non-focal  SKIN: No rashes or lesions        Assessment and Plan:   · Assessment		    A/P:  89 y.o female with sialoadenitis 2* sialolith - clinically improving.    ·	cont abx- ID f/u for duration  ·	can wean decadron  ·	soft diet/full liquid as tolerated  if swelling continues to go down, may be able to remove stone as IP, otherwise pt will f/u OP   -ENT f/u    PT/rehab  OOB to chair    DC planning when cleared by ENT and ID-   DVT prophylaxis

## 2018-07-07 NOTE — PROGRESS NOTE ADULT - SUBJECTIVE AND OBJECTIVE BOX
Patient was seen and examined. Spoke with RN. Chart reviewed.    No events overnight.  Vital Signs Last 24 Hrs  T(F): 96.7 (07 Jul 2018 05:00), Max: 97.2 (06 Jul 2018 20:17)  HR: 56 (07 Jul 2018 05:00) (56 - 64)  BP: 166/72 (07 Jul 2018 05:00) (141/62 - 166/72)  SpO2: --  MEDICATIONS  (STANDING):  amoxicillin  875 milliGRAM(s)/clavulanate 1 Tablet(s) Oral two times a day  docusate sodium 100 milliGRAM(s) Oral three times a day  enoxaparin Injectable 40 milliGRAM(s) SubCutaneous every 24 hours  melatonin 3 milliGRAM(s) Oral at bedtime  pantoprazole    Tablet 40 milliGRAM(s) Oral before breakfast  predniSONE   Tablet 40 milliGRAM(s) Oral daily    MEDICATIONS  (PRN):  aluminum hydroxide/magnesium hydroxide/simethicone Suspension 30 milliLiter(s) Oral every 4 hours PRN Dyspepsia  ibuprofen  Tablet 400 milliGRAM(s) Oral every 6 hours PRN Mild pain  ondansetron Injectable 4 milliGRAM(s) IV Push every 6 hours PRN Nausea  senna 2 Tablet(s) Oral at bedtime PRN Constipation    Labs:                  Radiology:    General: comfortable, NAD  Neurology: A&Ox3, nonfocal  Head:  Normocephalic, atraumatic  ENT:  Mucosa moist, no ulcerations  Neck:  Supple, no JVD,   Skin: no breakdowns (as per RN)  Resp: CTA B/L  CV: RRR, S1S2,   GI: Soft, NT, bowel sounds  MS: No edema, + peripheral pulses, FROM all 4 extremity

## 2018-07-07 NOTE — PROGRESS NOTE ADULT - ASSESSMENT
A/P:  89 y.o female with sialoadenitis 2* sialolith - clinically improving.    ·	cont abx- ID f/u for duration  ·	can wean decadron  ·	soft diet/full liquid as tolerated  if swelling continues to go down, may be able to remove stone as IP, otherwise pt will f/u OP   -ENT f/u    PT/rehab  OOB to chair    DC planning when cleared by ENT and ID-   DVT prophylaxis

## 2018-07-07 NOTE — CHART NOTE - NSCHARTNOTEFT_GEN_A_CORE
Registered Dietitian Follow-Up     Patient Profile Reviewed                           Yes [x]   No []     Nutrition History Previously Obtained        Yes [x]  No []       Pertinent Subjective Information:  -Pt OOB in chair at time of assessment. Reports decreased appetite and decreasing PO intake ~30% meals. Currently Nectar thick liquids ordered, however per SLP reccs on 7/3 pt cleared for Dysphagia III with THIN liquids. Communicated diet adjustment with LIP as pt c/o dissatisfaction with nectar thickened liquids and states she is drinking thin liquids. See RD reccs below.      Pertinent Medical Interventions: S/p extubation- pt. transferred from ICU. Pain in throat and odynophagia ongoing- on abx. Sialoadenitis 2* sialolith- overall clinically improving, surgical removal to be completed as outpt. SLP evaluated pt. rec dysphagia 3 soft nectar consistency liquid diet.       Diet order: dysphagia 3 soft nectar consistency liquids     Anthropometrics:  - Ht. 149.8cm  - Wt. 64kg (7/3)  - %wt change-wt stable 61-64kg during admission thus far  - BMI 28.5  - IBW 97.5lbs      Pertinent Lab Data: no new labs since 7/4      Pertinent Meds: lovenox, prednisone, Protonix, Colace, Zofran, senna      Physical Findings:  - Appearance: AAO, OOB in chair   - GI function: denies symptoms, last BM- 7/7  - Tubes: no tubes noted  - Oral/Mouth cavity: reports ageusia; Sialoadenitis. SLP reccs 7/3: Dysphagia III Mechanical soft consistency, cut up meats with thin liquids  - Skin: intact (BS 17)     Nutrition Requirements (from RD note on 7/4)  Weight Used: dosing 64kg     Estimated Energy Needs    Continue [x] 1159-1256kcal (MSJ x 1.2-1.3 AF)     Estimated Protein Needs    Continue [x] 64-76g (1-1.2g/kg CBW)     Estimated Fluid Needs        Continue [x]  1ml/kcal or per LIP     Nutrient Intake: pt. reports 50% PO at meals     [x] Previous Nutrition Diagnosis: Inadequate protein energy intake            [x] Ongoing          [] Resolved    Nutrition Intervention:   1. meals and snacks  2. medical food supplement   3. nutrition related medication management     Rec:   1. Dysphagia III Mechanical soft, thin liquids   2. Add Ensure Enlive TID (strawberry).   3. Consider adding daily appetite stimulant as pt c/o persistent poor appetite.      Goal/Expected Outcome: In 3 days pt. to consume at least 50-75% PO and supplement      Indicator/Monitoring: RD will monitor diet order, energy intake, body composition, NFPF (appetite, swallowing difficulty). Registered Dietitian Follow-Up     Patient Profile Reviewed                           Yes [x]   No []     Nutrition History Previously Obtained        Yes [x]  No []       Pertinent Subjective Information:  -Pt OOB in chair at time of assessment. Reports decreased appetite and decreasing PO intake ~30% meals. Currently Nectar thick liquids ordered, however per SLP reccs on 7/3 pt cleared for Dysphagia III with THIN liquids. Communicated diet adjustment with LIP as pt c/o dissatisfaction with nectar thickened liquids and states she is drinking thin liquids. See RD reccs below.      Pertinent Medical Interventions: S/p extubation- pt. transferred from ICU. Pain in throat and odynophagia ongoing- on abx. Sialoadenitis 2* sialolith- overall clinically improving, surgical removal to be completed as outpt. SLP evaluated pt. rec dysphagia 3 soft nectar consistency liquid diet.       Diet order: dysphagia 3 soft nectar consistency liquids     Anthropometrics:  - Ht. 149.8cm  - Wt. 64kg (7/3)  - %wt change-wt stable 61-64kg during admission thus far  - BMI 28.5  - IBW 97.5lbs      Pertinent Lab Data: no new labs since 7/4      Pertinent Meds: lovenox, prednisone, Protonix, Colace, Zofran, senna      Physical Findings:  - Appearance: AAO, OOB in chair   - GI function: denies symptoms, last BM- 7/7  - Tubes: no tubes noted  - Oral/Mouth cavity: reports ageusia; Sialoadenitis. SLP reccs 7/3: Dysphagia III Mechanical soft consistency, cut up meats with thin liquids  - Skin: intact (BS 17)     Nutrition Requirements (from RD note on 7/4)  Weight Used: dosing 64kg     Estimated Energy Needs    Continue [x] 1159-1256kcal (MSJ x 1.2-1.3 AF)     Estimated Protein Needs    Continue [x] 64-76g (1-1.2g/kg CBW)     Estimated Fluid Needs        Continue [x]  1ml/kcal or per LIP     Nutrient Intake: pt. reports 50% PO at meals     [x] Previous Nutrition Diagnosis: Inadequate protein energy intake            [x] Ongoing          [] Resolved    Nutrition Intervention:   1. meals and snacks  2. medical food supplement   3. nutrition related medication management     Rec:   1. Dysphagia III Mechanical soft, thin liquids   2. Add Ensure Enlive BID (strawberry).   3. Consider adding daily appetite stimulant as pt c/o persistent poor appetite.      Goal/Expected Outcome: In 3 days pt. to consume at least 50-75% PO and supplement      Indicator/Monitoring: RD will monitor diet order, energy intake, body composition, NFPF (appetite, swallowing difficulty).

## 2018-07-07 NOTE — CHART NOTE - NSCHARTNOTESELECT_GEN_ALL_CORE
Nutrition Event/Event Note
Event Note
Event Note
PACU ANESTHESIA ADMISSION NOTE/Transfer Note
Transfer Note

## 2018-07-08 RX ADMIN — PANTOPRAZOLE SODIUM 40 MILLIGRAM(S): 20 TABLET, DELAYED RELEASE ORAL at 06:13

## 2018-07-08 RX ADMIN — Medication 1 TABLET(S): at 05:28

## 2018-07-08 RX ADMIN — Medication 40 MILLIGRAM(S): at 05:27

## 2018-07-08 RX ADMIN — Medication 3 MILLIGRAM(S): at 21:19

## 2018-07-08 RX ADMIN — ENOXAPARIN SODIUM 40 MILLIGRAM(S): 100 INJECTION SUBCUTANEOUS at 15:32

## 2018-07-08 RX ADMIN — Medication 1 TABLET(S): at 17:20

## 2018-07-09 VITALS
TEMPERATURE: 96 F | HEART RATE: 65 BPM | RESPIRATION RATE: 16 BRPM | SYSTOLIC BLOOD PRESSURE: 144 MMHG | DIASTOLIC BLOOD PRESSURE: 63 MMHG

## 2018-07-09 RX ADMIN — Medication 1 TABLET(S): at 05:57

## 2018-07-09 RX ADMIN — Medication 40 MILLIGRAM(S): at 05:57

## 2018-07-09 RX ADMIN — PANTOPRAZOLE SODIUM 40 MILLIGRAM(S): 20 TABLET, DELAYED RELEASE ORAL at 06:12

## 2018-07-09 RX ADMIN — ENOXAPARIN SODIUM 40 MILLIGRAM(S): 100 INJECTION SUBCUTANEOUS at 11:10

## 2018-07-09 NOTE — PROGRESS NOTE ADULT - PROVIDER SPECIALTY LIST ADULT
CCU
CCU
Critical Care
Critical Care
ENT
Infectious Disease
Internal Medicine
Pulmonology
Pulmonology
CCU
Critical Care
ENT
Internal Medicine
Infectious Disease

## 2018-07-09 NOTE — PROGRESS NOTE ADULT - SUBJECTIVE AND OBJECTIVE BOX
JANINE SNOW 89y Female  MRN#: [id number]   CODE STATUS:________      SUBJECTIVE  Patient is a 89y old Female who presents with a chief complaint of Left sided facial swelling , difficulty swallowing and muffled voice for 2 days (05 Jul 2018 16:25)    Currently admitted to medicine with the primary diagnosis of Sialadenitis     Today is hospital day 10d. This morning she is resting comfortably in bed and reports no new issues or overnight events.       OBJECTIVE  PAST MEDICAL & SURGICAL HISTORY  Osteoarthritis  Diverticulosis  Hiatal hernia  PUD (peptic ulcer disease)  Chronic obstructive pulmonary disease  S/P laparoscopic-assisted sigmoidectomy    ALLERGIES:  No Known Allergies    MEDICATIONS:  STANDING MEDICATIONS  amoxicillin  875 milliGRAM(s)/clavulanate 1 Tablet(s) Oral two times a day  docusate sodium 100 milliGRAM(s) Oral three times a day  enoxaparin Injectable 40 milliGRAM(s) SubCutaneous every 24 hours  melatonin 3 milliGRAM(s) Oral at bedtime  pantoprazole    Tablet 40 milliGRAM(s) Oral before breakfast  predniSONE   Tablet 40 milliGRAM(s) Oral daily    PRN MEDICATIONS  aluminum hydroxide/magnesium hydroxide/simethicone Suspension 30 milliLiter(s) Oral every 4 hours PRN  ibuprofen  Tablet 400 milliGRAM(s) Oral every 6 hours PRN  ondansetron Injectable 4 milliGRAM(s) IV Push every 6 hours PRN  senna 2 Tablet(s) Oral at bedtime PRN      VITAL SIGNS: Last 24 Hours  T(C): 35.7 (09 Jul 2018 13:14), Max: 36.1 (08 Jul 2018 20:09)  T(F): 96.2 (09 Jul 2018 13:14), Max: 97 (08 Jul 2018 20:09)  HR: 65 (09 Jul 2018 13:14) (55 - 72)  BP: 144/63 (09 Jul 2018 13:14) (134/64 - 152/97)  BP(mean): --  RR: 16 (09 Jul 2018 13:14) (16 - 18)  SpO2: --    LABS:                        RADIOLOGY:      PHYSICAL EXAM:    GENERAL: NAD, well-developed, AAOx3  HEENT:  Atraumatic, Normocephalic. EOMI, PERRLA, conjunctiva and sclera clear, No JVD  PULMONARY: Clear to auscultation bilaterally; No wheeze  CARDIOVASCULAR: Regular rate and rhythm; No murmurs, rubs, or gallops  GASTROINTESTINAL: Soft, Nontender, Nondistended; Bowel sounds present  MUSCULOSKELETAL:  2+ Peripheral Pulses, No clubbing, cyanosis, or edema  NEUROLOGY: non-focal  SKIN: No rashes or lesions      ADMISSION SUMMARY  Patient is a 89y old Female who presents with a chief complaint of Left sided facial swelling , difficulty swallowing and muffled voice for 2 days (05 Jul 2018 16:25)    Currently admitted to medicine with the primary diagnosis of Sialadenitis     Today is hospital day 10d. This morning she is resting comfortably in bed and reports no new issues or overnight events.       ASSESSMENT & PLAN    #Left Saladenitis  -CT evidence of salivary stone  -cont. Unasyn and Clindamycin to PO  -Mya Dexamethasone: 2mg PO q 12 then the following 2 day 2mg once a day.  -IV hydration  -ENT f/u out pt for salivary stone management    #Urinary incontinence  - sxs of Urgency and Dysuria  -negative UA r/o infection  - Bladder Scan negative  - Outpatient follow up.    #PUD  -cont with IV protonix    #COPD  stable not wheezing    #DVT  -ppx with heparin    #Disposition  -DC to SNF when bed available

## 2018-07-09 NOTE — PROGRESS NOTE ADULT - SUBJECTIVE AND OBJECTIVE BOX
SUBJECTIVE:    Patient is a 89y old Female who presents with a chief complaint of Left sided facial swelling , difficulty swallowing and muffled voice for 2 days (05 Jul 2018 16:25)    Currently admitted to medicine with the primary diagnosis of Sialadenitis     Today is hospital day 10d. This morning she is resting comfortably in bed and reports no new issues or overnight events.     PAST MEDICAL & SURGICAL HISTORY  Osteoarthritis  Diverticulosis  Hiatal hernia  PUD (peptic ulcer disease)  Chronic obstructive pulmonary disease  S/P laparoscopic-assisted sigmoidectomy    SOCIAL HISTORY:  Negative for smoking/alcohol/drug use.     ALLERGIES:  No Known Allergies    MEDICATIONS:  STANDING MEDICATIONS  amoxicillin  875 milliGRAM(s)/clavulanate 1 Tablet(s) Oral two times a day  docusate sodium 100 milliGRAM(s) Oral three times a day  enoxaparin Injectable 40 milliGRAM(s) SubCutaneous every 24 hours  melatonin 3 milliGRAM(s) Oral at bedtime  pantoprazole    Tablet 40 milliGRAM(s) Oral before breakfast  predniSONE   Tablet 40 milliGRAM(s) Oral daily    PRN MEDICATIONS  aluminum hydroxide/magnesium hydroxide/simethicone Suspension 30 milliLiter(s) Oral every 4 hours PRN  ibuprofen  Tablet 400 milliGRAM(s) Oral every 6 hours PRN  ondansetron Injectable 4 milliGRAM(s) IV Push every 6 hours PRN  senna 2 Tablet(s) Oral at bedtime PRN    VITALS:   T(F): 96.9  HR: 55  BP: 134/64  RR: 18  SpO2: --    LABS:                        RADIOLOGY:    PHYSICAL EXAM:  GEN: No acute distress  LUNGS: Clear to auscultation bilaterally   HEART: S1/S2 present. RRR.   ABD: Soft, non-tender, non-distended. Bowel sounds present  EXT: NC/NC/NE/2+PP/BIRCH  NEURO: AAOX3 SUBJECTIVE:    Patient is a 89y old Female who presents with a chief complaint of Left sided facial swelling , difficulty swallowing and muffled voice for 2 days (05 Jul 2018 16:25)    Currently admitted to medicine with the primary diagnosis of Sialadenitis     Today is hospital day 10d. This morning she is resting comfortably in bed and reports no new issues or overnight events.     PAST MEDICAL & SURGICAL HISTORY  Osteoarthritis  Diverticulosis  Hiatal hernia  PUD (peptic ulcer disease)  Chronic obstructive pulmonary disease  S/P laparoscopic-assisted sigmoidectomy    SOCIAL HISTORY:  Negative for smoking/alcohol/drug use.     ALLERGIES:  No Known Allergies    MEDICATIONS:  STANDING MEDICATIONS  amoxicillin  875 milliGRAM(s)/clavulanate 1 Tablet(s) Oral two times a day  docusate sodium 100 milliGRAM(s) Oral three times a day  enoxaparin Injectable 40 milliGRAM(s) SubCutaneous every 24 hours  melatonin 3 milliGRAM(s) Oral at bedtime  pantoprazole    Tablet 40 milliGRAM(s) Oral before breakfast  predniSONE   Tablet 40 milliGRAM(s) Oral daily    PRN MEDICATIONS  aluminum hydroxide/magnesium hydroxide/simethicone Suspension 30 milliLiter(s) Oral every 4 hours PRN  ibuprofen  Tablet 400 milliGRAM(s) Oral every 6 hours PRN  ondansetron Injectable 4 milliGRAM(s) IV Push every 6 hours PRN  senna 2 Tablet(s) Oral at bedtime PRN    VITALS:   T(F): 96.9  HR: 55  BP: 134/64  RR: 18  SpO2: --    LABS:      CBC Hgb=11.5  gluc= 137        RADIOLOGY:  < from: Xray Chest 1 View- PORTABLE-Routine (07.03.18 @ 05:16) >  Impression:      Stable small left pleural effusion.    < end of copied text >      PHYSICAL EXAM:  GEN: No acute distress  LUNGS: Clear to auscultation bilaterally   HEART: S1/S2 present. RRR.   ABD: Soft, non-tender, non-distended. Bowel sounds present  EXT: NC/NC/NE/2+PP/BIRCH  NEURO: AAOX3

## 2018-07-09 NOTE — PROGRESS NOTE ADULT - SUBJECTIVE AND OBJECTIVE BOX
Patient was seen and examined. Spoke with RN. Chart reviewed.    No events overnight.  Vital Signs Last 24 Hrs  T(F): 96.9 (09 Jul 2018 05:00), Max: 97 (08 Jul 2018 20:09)  HR: 55 (09 Jul 2018 05:00) (55 - 77)  BP: 134/64 (09 Jul 2018 05:00) (120/68 - 152/97)  SpO2: --  MEDICATIONS  (STANDING):  amoxicillin  875 milliGRAM(s)/clavulanate 1 Tablet(s) Oral two times a day  docusate sodium 100 milliGRAM(s) Oral three times a day  enoxaparin Injectable 40 milliGRAM(s) SubCutaneous every 24 hours  melatonin 3 milliGRAM(s) Oral at bedtime  pantoprazole    Tablet 40 milliGRAM(s) Oral before breakfast  predniSONE   Tablet 40 milliGRAM(s) Oral daily    MEDICATIONS  (PRN):  aluminum hydroxide/magnesium hydroxide/simethicone Suspension 30 milliLiter(s) Oral every 4 hours PRN Dyspepsia  ibuprofen  Tablet 400 milliGRAM(s) Oral every 6 hours PRN Mild pain  ondansetron Injectable 4 milliGRAM(s) IV Push every 6 hours PRN Nausea  senna 2 Tablet(s) Oral at bedtime PRN Constipation    Labs:                  Radiology:    General: comfortable, NAD  Neurology: A&Ox3, nonfocal  Head:  Normocephalic, atraumatic  ENT:  Mucosa moist, no ulcerations  Neck:  Supple, no JVD, left neck swelling  Skin: no breakdowns (as per RN)  Resp: CTA B/L  CV: RRR, S1S2,   GI: Soft, NT, bowel sounds  MS: No edema, + peripheral pulses, FROM all 4 extremity

## 2018-07-09 NOTE — PROGRESS NOTE ADULT - ASSESSMENT
Assessment and Plan:   · Assessment		  #Left Saladenitis  -CT evidence of salivary stone  - cont. Unasyn and Clindamycin to PO  -Mya Dexamethasone: 2mg PO q 12 then the following 2 day 2mg once a day.  -IV hydration dc  -ENT f/u out pt for salivary stone management      #Urinary incontinence  - sxs of Urgency and Dysuria  -negative UA r/o infection  - Bladder Scan negative  - Outpatient follow up.    #PUD  -cont with IV protonix    #COPDable not wheezing    #DVT  -ppx with heparin  Dc to snf if bed available  Dc to snf if bed available today

## 2018-07-10 ENCOUNTER — OUTPATIENT (OUTPATIENT)
Dept: OUTPATIENT SERVICES | Facility: HOSPITAL | Age: 83
LOS: 1 days | Discharge: HOME | End: 2018-07-10

## 2018-07-10 DIAGNOSIS — Z90.49 ACQUIRED ABSENCE OF OTHER SPECIFIED PARTS OF DIGESTIVE TRACT: Chronic | ICD-10-CM

## 2018-07-10 DIAGNOSIS — R79.9 ABNORMAL FINDING OF BLOOD CHEMISTRY, UNSPECIFIED: ICD-10-CM

## 2018-07-14 ENCOUNTER — OUTPATIENT (OUTPATIENT)
Dept: OUTPATIENT SERVICES | Facility: HOSPITAL | Age: 83
LOS: 1 days | Discharge: HOME | End: 2018-07-14

## 2018-07-14 DIAGNOSIS — Z90.49 ACQUIRED ABSENCE OF OTHER SPECIFIED PARTS OF DIGESTIVE TRACT: Chronic | ICD-10-CM

## 2018-07-15 DIAGNOSIS — K11.3 ABSCESS OF SALIVARY GLAND: ICD-10-CM

## 2018-07-15 DIAGNOSIS — R07.0 PAIN IN THROAT: ICD-10-CM

## 2018-07-15 DIAGNOSIS — J98.8 OTHER SPECIFIED RESPIRATORY DISORDERS: ICD-10-CM

## 2018-07-15 DIAGNOSIS — K59.00 CONSTIPATION, UNSPECIFIED: ICD-10-CM

## 2018-07-15 DIAGNOSIS — K11.21 ACUTE SIALOADENITIS: ICD-10-CM

## 2018-07-15 DIAGNOSIS — M17.0 BILATERAL PRIMARY OSTEOARTHRITIS OF KNEE: ICD-10-CM

## 2018-07-15 DIAGNOSIS — R32 UNSPECIFIED URINARY INCONTINENCE: ICD-10-CM

## 2018-07-15 DIAGNOSIS — L03.221 CELLULITIS OF NECK: ICD-10-CM

## 2018-07-15 DIAGNOSIS — E87.0 HYPEROSMOLALITY AND HYPERNATREMIA: ICD-10-CM

## 2018-07-15 DIAGNOSIS — K11.5 SIALOLITHIASIS: ICD-10-CM

## 2018-07-15 DIAGNOSIS — J44.9 CHRONIC OBSTRUCTIVE PULMONARY DISEASE, UNSPECIFIED: ICD-10-CM

## 2018-07-15 DIAGNOSIS — J36 PERITONSILLAR ABSCESS: ICD-10-CM

## 2018-07-15 DIAGNOSIS — I73.9 PERIPHERAL VASCULAR DISEASE, UNSPECIFIED: ICD-10-CM

## 2018-07-16 DIAGNOSIS — R19.7 DIARRHEA, UNSPECIFIED: ICD-10-CM

## 2018-08-03 ENCOUNTER — APPOINTMENT (OUTPATIENT)
Dept: OTOLARYNGOLOGY | Facility: CLINIC | Age: 83
End: 2018-08-03

## 2018-08-03 VITALS
SYSTOLIC BLOOD PRESSURE: 112 MMHG | HEIGHT: 58 IN | BODY MASS INDEX: 30.44 KG/M2 | WEIGHT: 145 LBS | DIASTOLIC BLOOD PRESSURE: 70 MMHG

## 2018-08-07 ENCOUNTER — APPOINTMENT (OUTPATIENT)
Dept: CARDIOLOGY | Facility: CLINIC | Age: 83
End: 2018-08-07

## 2018-08-08 ENCOUNTER — APPOINTMENT (OUTPATIENT)
Dept: CARDIOLOGY | Facility: CLINIC | Age: 83
End: 2018-08-08

## 2018-08-08 VITALS — HEIGHT: 58 IN | WEIGHT: 138 LBS | BODY MASS INDEX: 28.97 KG/M2

## 2018-08-08 VITALS — DIASTOLIC BLOOD PRESSURE: 80 MMHG | HEART RATE: 96 BPM | SYSTOLIC BLOOD PRESSURE: 130 MMHG | RESPIRATION RATE: 18 BRPM

## 2018-08-08 RX ORDER — PANTOPRAZOLE 40 MG/1
40 TABLET, DELAYED RELEASE ORAL
Refills: 0 | Status: DISCONTINUED | COMMUNITY
End: 2018-08-08

## 2019-02-08 ENCOUNTER — APPOINTMENT (OUTPATIENT)
Dept: CARDIOLOGY | Facility: CLINIC | Age: 84
End: 2019-02-08

## 2019-02-08 VITALS — HEIGHT: 58 IN | WEIGHT: 147 LBS | BODY MASS INDEX: 30.86 KG/M2

## 2019-02-08 VITALS — DIASTOLIC BLOOD PRESSURE: 80 MMHG | RESPIRATION RATE: 18 BRPM | SYSTOLIC BLOOD PRESSURE: 130 MMHG | HEART RATE: 76 BPM

## 2019-02-08 RX ORDER — AMOXICILLIN AND CLAVULANATE POTASSIUM 500; 125 MG/1; MG/1
500-125 TABLET, FILM COATED ORAL
Qty: 20 | Refills: 0 | Status: DISCONTINUED | COMMUNITY
Start: 2018-03-15 | End: 2019-02-08

## 2019-02-08 RX ORDER — VALACYCLOVIR 500 MG/1
500 TABLET, FILM COATED ORAL
Qty: 42 | Refills: 0 | Status: DISCONTINUED | COMMUNITY
Start: 2017-11-30 | End: 2019-02-08

## 2019-02-08 RX ORDER — AMOXICILLIN 500 MG/1
500 CAPSULE ORAL
Qty: 21 | Refills: 0 | Status: DISCONTINUED | COMMUNITY
Start: 2018-04-05 | End: 2019-02-08

## 2019-02-08 RX ORDER — PANTOPRAZOLE 20 MG/1
20 TABLET, DELAYED RELEASE ORAL
Refills: 0 | Status: ACTIVE | COMMUNITY

## 2019-02-08 RX ORDER — GABAPENTIN 100 MG/1
100 CAPSULE ORAL
Qty: 60 | Refills: 0 | Status: DISCONTINUED | COMMUNITY
Start: 2017-12-09 | End: 2019-02-08

## 2019-02-08 RX ORDER — CIPROFLOXACIN HYDROCHLORIDE 500 MG/1
500 TABLET, FILM COATED ORAL
Qty: 14 | Refills: 0 | Status: DISCONTINUED | COMMUNITY
Start: 2018-04-14 | End: 2019-02-08

## 2019-02-08 NOTE — PHYSICAL EXAM
[General Appearance - Well Developed] : well developed [Normal Appearance] : normal appearance [Well Groomed] : well groomed [General Appearance - Well Nourished] : well nourished [No Deformities] : no deformities [General Appearance - In No Acute Distress] : no acute distress [Normal Conjunctiva] : the conjunctiva exhibited no abnormalities [Eyelids - No Xanthelasma] : the eyelids demonstrated no xanthelasmas [Normal Oral Mucosa] : normal oral mucosa [No Oral Pallor] : no oral pallor [No Oral Cyanosis] : no oral cyanosis [Normal Jugular Venous A Waves Present] : normal jugular venous A waves present [Normal Jugular Venous V Waves Present] : normal jugular venous V waves present [No Jugular Venous Yoo A Waves] : no jugular venous yoo A waves [Respiration, Rhythm And Depth] : normal respiratory rhythm and effort [Exaggerated Use Of Accessory Muscles For Inspiration] : no accessory muscle use [Auscultation Breath Sounds / Voice Sounds] : lungs were clear to auscultation bilaterally [Heart Rate And Rhythm] : heart rate and rhythm were normal [Systolic grade ___/6] : A grade [unfilled]/6 systolic murmur was heard. [Bowel Sounds] : normal bowel sounds [Abdomen Soft] : soft [Abdomen Tenderness] : non-tender [Abdomen Mass (___ Cm)] : no abdominal mass palpated [Abnormal Walk] : normal gait [Nail Clubbing] : no clubbing of the fingernails [Cyanosis, Localized] : no localized cyanosis [Petechial Hemorrhages (___cm)] : no petechial hemorrhages [Skin Color & Pigmentation] : normal skin color and pigmentation [] : no rash [No Venous Stasis] : no venous stasis [Skin Lesions] : no skin lesions [No Skin Ulcers] : no skin ulcer [No Xanthoma] : no  xanthoma was observed [Oriented To Time, Place, And Person] : oriented to person, place, and time

## 2019-04-05 ENCOUNTER — INPATIENT (INPATIENT)
Facility: HOSPITAL | Age: 84
LOS: 4 days | Discharge: NURSING, REHAB OR LONG-TERM CARE | End: 2019-04-10
Attending: INTERNAL MEDICINE | Admitting: INTERNAL MEDICINE
Payer: MEDICARE

## 2019-04-05 VITALS
DIASTOLIC BLOOD PRESSURE: 99 MMHG | HEART RATE: 77 BPM | SYSTOLIC BLOOD PRESSURE: 216 MMHG | TEMPERATURE: 98 F | WEIGHT: 149.91 LBS | OXYGEN SATURATION: 92 % | RESPIRATION RATE: 18 BRPM

## 2019-04-05 DIAGNOSIS — R11.2 NAUSEA WITH VOMITING, UNSPECIFIED: ICD-10-CM

## 2019-04-05 DIAGNOSIS — K27.9 PEPTIC ULCER, SITE UNSPECIFIED, UNSPECIFIED AS ACUTE OR CHRONIC, WITHOUT HEMORRHAGE OR PERFORATION: ICD-10-CM

## 2019-04-05 DIAGNOSIS — J44.9 CHRONIC OBSTRUCTIVE PULMONARY DISEASE, UNSPECIFIED: ICD-10-CM

## 2019-04-05 DIAGNOSIS — R53.1 WEAKNESS: ICD-10-CM

## 2019-04-05 DIAGNOSIS — R19.7 DIARRHEA, UNSPECIFIED: ICD-10-CM

## 2019-04-05 DIAGNOSIS — M19.90 UNSPECIFIED OSTEOARTHRITIS, UNSPECIFIED SITE: ICD-10-CM

## 2019-04-05 DIAGNOSIS — Z90.49 ACQUIRED ABSENCE OF OTHER SPECIFIED PARTS OF DIGESTIVE TRACT: Chronic | ICD-10-CM

## 2019-04-05 LAB
ALBUMIN SERPL ELPH-MCNC: 4.3 G/DL — SIGNIFICANT CHANGE UP (ref 3.5–5.2)
ALLERGY+IMMUNOLOGY DIAG STUDY NOTE: SIGNIFICANT CHANGE UP
ALP SERPL-CCNC: 70 U/L — SIGNIFICANT CHANGE UP (ref 30–115)
ALT FLD-CCNC: 30 U/L — SIGNIFICANT CHANGE UP (ref 0–41)
ANION GAP SERPL CALC-SCNC: 16 MMOL/L — HIGH (ref 7–14)
APPEARANCE UR: CLEAR — SIGNIFICANT CHANGE UP
APTT BLD: 26.8 SEC — LOW (ref 27–39.2)
AST SERPL-CCNC: 36 U/L — SIGNIFICANT CHANGE UP (ref 0–41)
BACTERIA # UR AUTO: ABNORMAL
BASE EXCESS BLDV CALC-SCNC: -0.8 MMOL/L — SIGNIFICANT CHANGE UP (ref -2–2)
BASOPHILS # BLD AUTO: 0.01 K/UL — SIGNIFICANT CHANGE UP (ref 0–0.2)
BASOPHILS NFR BLD AUTO: 0.2 % — SIGNIFICANT CHANGE UP (ref 0–1)
BILIRUB SERPL-MCNC: 0.7 MG/DL — SIGNIFICANT CHANGE UP (ref 0.2–1.2)
BILIRUB UR-MCNC: NEGATIVE — SIGNIFICANT CHANGE UP
BUN SERPL-MCNC: 21 MG/DL — HIGH (ref 10–20)
CA-I SERPL-SCNC: 1.14 MMOL/L — SIGNIFICANT CHANGE UP (ref 1.12–1.3)
CALCIUM SERPL-MCNC: 8.7 MG/DL — SIGNIFICANT CHANGE UP (ref 8.5–10.1)
CHLORIDE SERPL-SCNC: 101 MMOL/L — SIGNIFICANT CHANGE UP (ref 98–110)
CO2 SERPL-SCNC: 24 MMOL/L — SIGNIFICANT CHANGE UP (ref 17–32)
COD CRY URNS QL: NEGATIVE — SIGNIFICANT CHANGE UP
COLOR SPEC: YELLOW — SIGNIFICANT CHANGE UP
CREAT SERPL-MCNC: 0.9 MG/DL — SIGNIFICANT CHANGE UP (ref 0.7–1.5)
DIFF PNL FLD: ABNORMAL
EOSINOPHIL # BLD AUTO: 0.01 K/UL — SIGNIFICANT CHANGE UP (ref 0–0.7)
EOSINOPHIL NFR BLD AUTO: 0.2 % — SIGNIFICANT CHANGE UP (ref 0–8)
EPI CELLS # UR: ABNORMAL /HPF
GAS PNL BLDV: 140 MMOL/L — SIGNIFICANT CHANGE UP (ref 136–145)
GAS PNL BLDV: SIGNIFICANT CHANGE UP
GLUCOSE SERPL-MCNC: 157 MG/DL — HIGH (ref 70–99)
GLUCOSE UR QL: NEGATIVE MG/DL — SIGNIFICANT CHANGE UP
GRAN CASTS # UR COMP ASSIST: NEGATIVE — SIGNIFICANT CHANGE UP
HCO3 BLDV-SCNC: 25 MMOL/L — SIGNIFICANT CHANGE UP (ref 22–29)
HCT VFR BLD CALC: 39.1 % — SIGNIFICANT CHANGE UP (ref 37–47)
HCT VFR BLDA CALC: 51.8 % — HIGH (ref 34–44)
HGB BLD CALC-MCNC: 16.9 G/DL — SIGNIFICANT CHANGE UP (ref 14–18)
HGB BLD-MCNC: 12.7 G/DL — SIGNIFICANT CHANGE UP (ref 12–16)
HOROWITZ INDEX BLDV+IHG-RTO: 21 — SIGNIFICANT CHANGE UP
HYALINE CASTS # UR AUTO: ABNORMAL /LPF
IMM GRANULOCYTES NFR BLD AUTO: 0.7 % — HIGH (ref 0.1–0.3)
INR BLD: 1.05 RATIO — SIGNIFICANT CHANGE UP (ref 0.65–1.3)
KETONES UR-MCNC: ABNORMAL
LACTATE BLDV-MCNC: 1.1 MMOL/L — SIGNIFICANT CHANGE UP (ref 0.5–1.6)
LACTATE SERPL-SCNC: 1.2 MMOL/L — SIGNIFICANT CHANGE UP (ref 0.5–2.2)
LEUKOCYTE ESTERASE UR-ACNC: ABNORMAL
LIDOCAIN IGE QN: 40 U/L — SIGNIFICANT CHANGE UP (ref 7–60)
LYMPHOCYTES # BLD AUTO: 0.59 K/UL — LOW (ref 1.2–3.4)
LYMPHOCYTES # BLD AUTO: 13 % — LOW (ref 20.5–51.1)
MCHC RBC-ENTMCNC: 27.5 PG — SIGNIFICANT CHANGE UP (ref 27–31)
MCHC RBC-ENTMCNC: 32.5 G/DL — SIGNIFICANT CHANGE UP (ref 32–37)
MCV RBC AUTO: 84.8 FL — SIGNIFICANT CHANGE UP (ref 81–99)
METAMYELOCYTES # FLD: 1 % — HIGH (ref 0–0)
MONOCYTES # BLD AUTO: 0.29 K/UL — SIGNIFICANT CHANGE UP (ref 0.1–0.6)
MONOCYTES NFR BLD AUTO: 6.4 % — SIGNIFICANT CHANGE UP (ref 1.7–9.3)
NEUTROPHILS # BLD AUTO: 3.6 K/UL — SIGNIFICANT CHANGE UP (ref 1.4–6.5)
NEUTROPHILS NFR BLD AUTO: 79.5 % — HIGH (ref 42.2–75.2)
NITRITE UR-MCNC: NEGATIVE — SIGNIFICANT CHANGE UP
NRBC # BLD: 0 /100 WBCS — SIGNIFICANT CHANGE UP (ref 0–0)
NRBC # BLD: 0 /100 — SIGNIFICANT CHANGE UP (ref 0–0)
PCO2 BLDV: 43 MMHG — SIGNIFICANT CHANGE UP (ref 41–51)
PH BLDV: 7.37 — SIGNIFICANT CHANGE UP (ref 7.26–7.43)
PH UR: 7 — SIGNIFICANT CHANGE UP (ref 5–8)
PLAT MORPH BLD: NORMAL — SIGNIFICANT CHANGE UP
PLATELET # BLD AUTO: 222 K/UL — SIGNIFICANT CHANGE UP (ref 130–400)
PLATELET COUNT - ESTIMATE: NORMAL — SIGNIFICANT CHANGE UP
PO2 BLDV: 53 MMHG — HIGH (ref 20–40)
POTASSIUM BLDV-SCNC: 3.9 MMOL/L — SIGNIFICANT CHANGE UP (ref 3.3–5.6)
POTASSIUM SERPL-MCNC: 4.3 MMOL/L — SIGNIFICANT CHANGE UP (ref 3.5–5)
POTASSIUM SERPL-SCNC: 4.3 MMOL/L — SIGNIFICANT CHANGE UP (ref 3.5–5)
PROT SERPL-MCNC: 7.7 G/DL — SIGNIFICANT CHANGE UP (ref 6–8)
PROT UR-MCNC: 100 MG/DL
PROTHROM AB SERPL-ACNC: 12.1 SEC — SIGNIFICANT CHANGE UP (ref 9.95–12.87)
RBC # BLD: 4.61 M/UL — SIGNIFICANT CHANGE UP (ref 4.2–5.4)
RBC # FLD: 15.9 % — HIGH (ref 11.5–14.5)
RBC BLD AUTO: NORMAL — SIGNIFICANT CHANGE UP
RBC CASTS # UR COMP ASSIST: ABNORMAL /HPF
SAO2 % BLDV: 85 % — SIGNIFICANT CHANGE UP
SODIUM SERPL-SCNC: 141 MMOL/L — SIGNIFICANT CHANGE UP (ref 135–146)
SP GR SPEC: 1.01 — SIGNIFICANT CHANGE UP (ref 1.01–1.03)
TRI-PHOS CRY UR QL COMP ASSIST: NEGATIVE — SIGNIFICANT CHANGE UP
TROPONIN T SERPL-MCNC: <0.01 NG/ML — SIGNIFICANT CHANGE UP
TYPE + AB SCN PNL BLD: SIGNIFICANT CHANGE UP
URATE CRY FLD QL MICRO: NEGATIVE — SIGNIFICANT CHANGE UP
UROBILINOGEN FLD QL: 0.2 MG/DL — SIGNIFICANT CHANGE UP (ref 0.2–0.2)
VARIANT LYMPHS # BLD: 1 % — SIGNIFICANT CHANGE UP (ref 0–5)
WBC # BLD: 4.53 K/UL — LOW (ref 4.8–10.8)
WBC # FLD AUTO: 4.53 K/UL — LOW (ref 4.8–10.8)
WBC UR QL: SIGNIFICANT CHANGE UP /HPF

## 2019-04-05 PROCEDURE — 74177 CT ABD & PELVIS W/CONTRAST: CPT | Mod: 26

## 2019-04-05 PROCEDURE — 99285 EMERGENCY DEPT VISIT HI MDM: CPT

## 2019-04-05 PROCEDURE — 71045 X-RAY EXAM CHEST 1 VIEW: CPT | Mod: 26

## 2019-04-05 PROCEDURE — 76705 ECHO EXAM OF ABDOMEN: CPT | Mod: 26

## 2019-04-05 PROCEDURE — 70450 CT HEAD/BRAIN W/O DYE: CPT | Mod: 26

## 2019-04-05 RX ORDER — BUDESONIDE AND FORMOTEROL FUMARATE DIHYDRATE 160; 4.5 UG/1; UG/1
2 AEROSOL RESPIRATORY (INHALATION)
Refills: 0 | Status: DISCONTINUED | OUTPATIENT
Start: 2019-04-05 | End: 2019-04-10

## 2019-04-05 RX ORDER — HEPARIN SODIUM 5000 [USP'U]/ML
5000 INJECTION INTRAVENOUS; SUBCUTANEOUS EVERY 12 HOURS
Refills: 0 | Status: DISCONTINUED | OUTPATIENT
Start: 2019-04-05 | End: 2019-04-10

## 2019-04-05 RX ORDER — INFLUENZA VIRUS VACCINE 15; 15; 15; 15 UG/.5ML; UG/.5ML; UG/.5ML; UG/.5ML
0.5 SUSPENSION INTRAMUSCULAR ONCE
Refills: 0 | Status: COMPLETED | OUTPATIENT
Start: 2019-04-05 | End: 2019-04-05

## 2019-04-05 RX ORDER — IPRATROPIUM/ALBUTEROL SULFATE 18-103MCG
3 AEROSOL WITH ADAPTER (GRAM) INHALATION EVERY 6 HOURS
Refills: 0 | Status: DISCONTINUED | OUTPATIENT
Start: 2019-04-05 | End: 2019-04-10

## 2019-04-05 RX ORDER — IBUPROFEN 200 MG
200 TABLET ORAL EVERY 8 HOURS
Refills: 0 | Status: DISCONTINUED | OUTPATIENT
Start: 2019-04-05 | End: 2019-04-10

## 2019-04-05 RX ORDER — SODIUM CHLORIDE 9 MG/ML
1000 INJECTION INTRAMUSCULAR; INTRAVENOUS; SUBCUTANEOUS ONCE
Refills: 0 | Status: COMPLETED | OUTPATIENT
Start: 2019-04-05 | End: 2019-04-05

## 2019-04-05 RX ORDER — ONDANSETRON 8 MG/1
4 TABLET, FILM COATED ORAL ONCE
Refills: 0 | Status: COMPLETED | OUTPATIENT
Start: 2019-04-05 | End: 2019-04-05

## 2019-04-05 RX ORDER — SODIUM CHLORIDE 9 MG/ML
1000 INJECTION INTRAMUSCULAR; INTRAVENOUS; SUBCUTANEOUS
Refills: 0 | Status: DISCONTINUED | OUTPATIENT
Start: 2019-04-05 | End: 2019-04-07

## 2019-04-05 RX ORDER — ACETAMINOPHEN 500 MG
650 TABLET ORAL EVERY 6 HOURS
Refills: 0 | Status: DISCONTINUED | OUTPATIENT
Start: 2019-04-05 | End: 2019-04-10

## 2019-04-05 RX ORDER — PANTOPRAZOLE SODIUM 20 MG/1
40 TABLET, DELAYED RELEASE ORAL
Refills: 0 | Status: DISCONTINUED | OUTPATIENT
Start: 2019-04-05 | End: 2019-04-10

## 2019-04-05 RX ORDER — TIOTROPIUM BROMIDE 18 UG/1
1 CAPSULE ORAL; RESPIRATORY (INHALATION) DAILY
Refills: 0 | Status: DISCONTINUED | OUTPATIENT
Start: 2019-04-05 | End: 2019-04-10

## 2019-04-05 RX ORDER — ONDANSETRON 8 MG/1
4 TABLET, FILM COATED ORAL
Refills: 0 | Status: DISCONTINUED | OUTPATIENT
Start: 2019-04-05 | End: 2019-04-10

## 2019-04-05 RX ORDER — SODIUM CHLORIDE 9 MG/ML
500 INJECTION INTRAMUSCULAR; INTRAVENOUS; SUBCUTANEOUS ONCE
Refills: 0 | Status: COMPLETED | OUTPATIENT
Start: 2019-04-05 | End: 2019-04-05

## 2019-04-05 RX ADMIN — ONDANSETRON 4 MILLIGRAM(S): 8 TABLET, FILM COATED ORAL at 17:15

## 2019-04-05 RX ADMIN — ONDANSETRON 4 MILLIGRAM(S): 8 TABLET, FILM COATED ORAL at 23:40

## 2019-04-05 RX ADMIN — ONDANSETRON 4 MILLIGRAM(S): 8 TABLET, FILM COATED ORAL at 21:00

## 2019-04-05 RX ADMIN — SODIUM CHLORIDE 2000 MILLILITER(S): 9 INJECTION INTRAMUSCULAR; INTRAVENOUS; SUBCUTANEOUS at 17:16

## 2019-04-05 RX ADMIN — SODIUM CHLORIDE 1000 MILLILITER(S): 9 INJECTION INTRAMUSCULAR; INTRAVENOUS; SUBCUTANEOUS at 20:01

## 2019-04-05 RX ADMIN — SODIUM CHLORIDE 500 MILLILITER(S): 9 INJECTION INTRAMUSCULAR; INTRAVENOUS; SUBCUTANEOUS at 21:00

## 2019-04-05 NOTE — H&P ADULT - NSHPREVIEWOFSYSTEMS_GEN_ALL_CORE
REVIEW OF SYSTEMS:    CONSTITUTIONAL: + weakness, but no fevers or chills. pt nable to ambulate  EYES/ENT: No visual changes;  No vertigo or throat pain   NECK: No pain or stiffness  RESPIRATORY: No cough, wheezing, hemoptysis; No shortness of breath  CARDIOVASCULAR: No chest pain or palpitations  GASTROINTESTINA: + nausea, vomiting.   GENITOURINARY: No dysuria, frequency or hematuria  NEUROLOGICAL: No numbness or weakness  SKIN: No itching, rashes

## 2019-04-05 NOTE — ED PROVIDER NOTE - PHYSICAL EXAMINATION
CONSTITUTIONAL: Well-developed; well-nourished; appears fatigued, keeping eyes closed but responds to verbal stimuli.   SKIN: warm, dry.  HEAD: Normocephalic; atraumatic.  EYES: PERRL, EOMI, no conjunctival erythema.  NECK: Supple; non tender.  CARD: S1, S2 normal; no murmurs, gallops, or rubs. Regular rate and rhythm.   RESP: No wheezes, rales or rhonchi.  ABD: soft, nondistended, mildly tender in the RUQ.   EXT: Normal ROM.  No clubbing, cyanosis or edema. DP pulses 2+ bilaterally.   NEURO: Alert, oriented, moving all four extremities. Sensation to light touch intact.   PSYCH: Cooperative, appropriate.

## 2019-04-05 NOTE — H&P ADULT - NSHPPHYSICALEXAM_GEN_ALL_CORE
GENERAL:  89y/o Female NAD, resting comfortably. + generalized weakness  HEAD:  Atraumatic, Normocephalic  EYES: EOMI, PERRLA, conjunctiva and sclera clear  NECK: Supple, No JVD, no cervical lymphadenopathy, non-tender  CHEST/LUNG: Clear to auscultation bilaterally; No wheeze, rhonchi, or rales  HEART: Regular rate and rhythm; S1&S2  ABDOMEN: Soft, Nontender, Nondistended x 4 quadrants; Bowel sounds present  EXTREMITIES:   Peripheral Pulses Present, No clubbing, no cyanosis, or no edema, no calf tenderness  PSYCH: AAOx3, cooperative, appropriate  NEUROLOGY: WNL  SKIN: WNL

## 2019-04-05 NOTE — ED PROVIDER NOTE - ATTENDING CONTRIBUTION TO CARE
generalized weakness and abd pain.  sx a/w N/V.  VS noted.  lower abd is diffusely ttp w/o R/G.  await lab, CT.

## 2019-04-05 NOTE — ED PROVIDER NOTE - OBJECTIVE STATEMENT
Patient is a 89 yo F w/ hx of COPD, sigmoidectomy s/p perforated diverticulitis p/w vomiting and weakness. Patient was treated with Abx for cough and congestion last week, then developed diarrhea; today, patient has been vomiting multiple times, has not been passing gas. Patient denies abdominal pain, fevers; last bowel movement was yesterday. Patient also endorses dry cough but denies SOB, chest pain. Denies dysuria or urinary frequency. Family bedside states patient appears weak and has a headache last week. Today patient denies headaches, denies changes in vision, denies numbness/tingling and focal weakness.

## 2019-04-05 NOTE — ED PROVIDER NOTE - NS ED ROS FT
Constitutional: No fevers.   Eyes:  No visual changes, eye pain or discharge.  ENMT:  No hearing changes, pain, no sore throat or runny nose, no difficulty swallowing  Cardiac:  No chest pain, SOB or edema.  Respiratory:  dry cough. +COPD.   GI:  +vomiting.   :  No dysuria, frequency or burning.  MS:  No myalgia, muscle weakness, joint pain or back pain.  Neuro:  No headache or weakness.  No LOC.  Skin:  No skin rash.   Endocrine: No history of thyroid disease or diabetes.

## 2019-04-05 NOTE — ED PROVIDER NOTE - CARE PLAN
Principal Discharge DX:	Weakness  Secondary Diagnosis:	Nausea & vomiting  Secondary Diagnosis:	Diarrhea

## 2019-04-05 NOTE — H&P ADULT - NSICDXPASTMEDICALHX_GEN_ALL_CORE_FT
PAST MEDICAL HISTORY:  Chronic obstructive pulmonary disease albuterol neb  o2 nc    Diverticulosis as per hx    Hiatal hernia as per hx    Osteoarthritis as per hx    PUD (peptic ulcer disease) protonix

## 2019-04-05 NOTE — H&P ADULT - NSHPLABSRESULTS_GEN_ALL_CORE
Vital Signs Last 24 Hrs  T(C): 36.5 (2019 17:20), Max: 36.5 (2019 17:20)  T(F): 97.7 (2019 17:20), Max: 97.7 (2019 17:20)  HR: 78 (2019 20:38) (77 - 84)  BP: 146/76 (2019 20:38) (146/76 - 216/99)  BP(mean): --  RR: 18 (2019 20:38) (18 - 18)  SpO2: 97% (2019 20:38) (92% - 97%)                        12.7   4.53  )-----------( 222      ( 2019 16:58 )             39.1           141  |  101  |  21<H>  ----------------------------<  157<H>  4.3   |  24  |  0.9    Ca    8.7      2019 16:58    TPro  7.7  /  Alb  4.3  /  TBili  0.7  /  DBili  x   /  AST  36  /  ALT  30  /  AlkPhos  70  04-05              Urinalysis Basic - ( 2019 19:30 )    Color: Yellow / Appearance: Clear / S.015 / pH: x  Gluc: x / Ketone: Trace  / Bili: Negative / Urobili: 0.2 mg/dL   Blood: x / Protein: 100 mg/dL / Nitrite: Negative   Leuk Esterase: Trace / RBC: x / WBC x   Sq Epi: x / Non Sq Epi: x / Bacteria: x        PT/INR - ( 2019 16:58 )   PT: 12.10 sec;   INR: 1.05 ratio         PTT - ( 2019 16:58 )  PTT:26.8 sec    Lactate Trend   @ 16:58 Lactate:1.2       CARDIAC MARKERS ( 2019 16:58 )  x     / <0.01 ng/mL / x     / x     / x            CAPILLARY BLOOD GLUCOSE

## 2019-04-05 NOTE — H&P ADULT - ASSESSMENT
· Chief Complaint: The patient is a 89y Female complaining of weakness.	  · HPI Objective Statement: Patient is a 89 yo F w/ hx of COPD, sigmoidectomy s/p perforated diverticulitis p/w vomiting and weakness. Patient was treated with Abx for cough and congestion last week, then developed diarrhea; today, patient has been vomiting multiple times, has not been passing gas. Patient denies abdominal pain, fevers; last bowel movement was yesterday. Patient also endorses dry cough but denies SOB, chest pain. Denies dysuria or urinary frequency. Family bedside states patient appears weak and has a headache last week. Today patient denies headaches, denies changes in vision, denies numbness/tingling and focal weakness.

## 2019-04-05 NOTE — H&P ADULT - PROBLEM SELECTOR PLAN 2
sbo was r/o as per  abdo ct  zofran prn sbo was r/o as per  abdo ct  zofran prn  ivf  npo to be reeval in AM if not nauseous anymore

## 2019-04-05 NOTE — ED PROVIDER NOTE - CLINICAL SUMMARY MEDICAL DECISION MAKING FREE TEXT BOX
pt signed out to me by dr. esquivel. labs and imaging reviewed/ patient reports generalized weakness. unable to ambulate. still nauseas. will admit. unsafe for dc. pt lives alone. cannot ambulate in the ed due to general weakness.

## 2019-04-05 NOTE — H&P ADULT - HISTORY OF PRESENT ILLNESS
· Chief Complaint: The patient is a 89y Female complaining of weakness.	  · HPI Objective Statement: Patient is a 89 yo F w/ hx of COPD, sigmoidectomy s/p perforated diverticulitis p/w vomiting and weakness. Patient was treated with Abx for cough and congestion last week, then developed diarrhea; today, patient has been vomiting multiple times, has not been passing gas. Patient denies abdominal pain, fevers; last bowel movement was yesterday. Patient also endorses dry cough but denies SOB, chest pain. Denies dysuria or urinary frequency. Family bedside states patient appears weak and has a headache last week. Today patient denies headaches, denies changes in vision, denies numbness/tingling and focal weakness.

## 2019-04-05 NOTE — ED ADULT NURSE NOTE - NSIMPLEMENTINTERV_GEN_ALL_ED
Implemented All Fall with Harm Risk Interventions:  [place] to call system. Call bell, personal items and telephone within reach. Instruct patient to call for assistance. Room bathroom lighting operational. Non-slip footwear when patient is off stretcher. Physically safe environment: no spills, clutter or unnecessary equipment. Stretcher in lowest position, wheels locked, appropriate side rails in place. Provide visual cue, wrist band, yellow gown, etc. Monitor gait and stability. Monitor for mental status changes and reorient to person, place, and time. Review medications for side effects contributing to fall risk. Reinforce activity limits and safety measures with patient and family. Provide visual clues: red socks.

## 2019-04-06 RX ADMIN — Medication 3 MILLILITER(S): at 20:35

## 2019-04-06 RX ADMIN — Medication 3 MILLILITER(S): at 13:09

## 2019-04-06 RX ADMIN — Medication 3 MILLILITER(S): at 07:46

## 2019-04-06 RX ADMIN — Medication 100 MILLIGRAM(S): at 13:09

## 2019-04-06 RX ADMIN — HEPARIN SODIUM 5000 UNIT(S): 5000 INJECTION INTRAVENOUS; SUBCUTANEOUS at 07:02

## 2019-04-06 RX ADMIN — SODIUM CHLORIDE 75 MILLILITER(S): 9 INJECTION INTRAMUSCULAR; INTRAVENOUS; SUBCUTANEOUS at 14:04

## 2019-04-06 RX ADMIN — Medication 100 MILLIGRAM(S): at 17:31

## 2019-04-06 RX ADMIN — SODIUM CHLORIDE 75 MILLILITER(S): 9 INJECTION INTRAMUSCULAR; INTRAVENOUS; SUBCUTANEOUS at 00:34

## 2019-04-06 RX ADMIN — HEPARIN SODIUM 5000 UNIT(S): 5000 INJECTION INTRAVENOUS; SUBCUTANEOUS at 17:30

## 2019-04-06 NOTE — PHYSICAL THERAPY INITIAL EVALUATION ADULT - GENERAL OBSERVATIONS, REHAB EVAL
10: 20 to 10: 45. Chart reviewed, ok to be seen by PT as confirmed with RN, pt received semireclined in bed, NAD, no c/o pain, but reports feeling weak and tired., +IV drip

## 2019-04-06 NOTE — PROGRESS NOTE ADULT - SUBJECTIVE AND OBJECTIVE BOX
Patient is a 89y old  Female who presents with a chief complaint of weakness/ (2019 20:47)      HPI:  · Chief Complaint: The patient is a 89y Female complaining of weakness.	  · HPI Objective Statement: Patient is a 89 yo F w/ hx of COPD, sigmoidectomy s/p perforated diverticulitis p/w vomiting and weakness. Patient was treated with Abx for cough and congestion last week, then developed diarrhea; today, patient has been vomiting multiple times, has not been passing gas. Patient denies abdominal pain, fevers; last bowel movement was yesterday. Patient also endorses dry cough but denies SOB, chest pain. Denies dysuria or urinary frequency. Family bedside states patient appears weak and has a headache last week. Today patient denies headaches, denies changes in vision, denies numbness/tingling and focal weakness. (2019 20:47)      INTERVAL HPI/OVERNIGHT EVENTS:  T(C): 36.2 (19 @ 14:20), Max: 36.5 (19 @ 17:20)  HR: 99 (19 @ 14:20) (72 - 99)  BP: 141/65 (19 @ 14:20) (141/65 - 189/88)  RR: 16 (19 @ 14:20) (16 - 18)  SpO2: 97% (19 @ 09:52) (93% - 98%)  Wt(kg): --  I&O's Summary    2019 07:01  -  2019 16:52  --------------------------------------------------------  IN: 1000 mL / OUT: 0 mL / NET: 1000 mL          PHYSICAL EXAM  HEAD:  Atraumatic, Normocephalic  EYES: EOMI, PERRLA, conjunctiva and sclera clear  ENMT: No tonsillar erythema, exudates, or enlargement; Moist mucous membranes, Good dentition, No lesions  NECK: Supple, No JVD, Normal thyroid  CHEST/LUNG: Clear to percussion bilaterally; No rales, rhonchi, wheezing, or rubs  HEART: Regular rate and rhythm; No murmurs, rubs, or gallops  ABDOMEN: Soft, Nontender, Nondistended; Bowel sounds present  EXTREMITIES:  2+ Peripheral Pulses, No clubbing, cyanosis, or edema  LYMPH: No lymphadenopathy noted  SKIN: No rashes or lesions  neuro awake tremor hand  MEDICATIONS  (STANDING):  ALBUTerol/ipratropium for Nebulization 3 milliLiter(s) Nebulizer every 6 hours  buDESOnide 160 MICROgram(s)/formoterol 4.5 MICROgram(s) Inhaler 2 Puff(s) Inhalation two times a day  heparin  Injectable 5000 Unit(s) SubCutaneous every 12 hours  influenza   Vaccine 0.5 milliLiter(s) IntraMuscular once  pantoprazole    Tablet 40 milliGRAM(s) Oral before breakfast  sodium chloride 0.9%. 1000 milliLiter(s) (75 mL/Hr) IV Continuous <Continuous>  tiotropium 18 MICROgram(s) Capsule 1 Capsule(s) Inhalation daily    MEDICATIONS  (PRN):  acetaminophen   Tablet .. 650 milliGRAM(s) Oral every 6 hours PRN Temp greater or equal to 38C (100.4F), Mild Pain (1 - 3)  aluminum hydroxide/magnesium hydroxide/simethicone Suspension 30 milliLiter(s) Oral every 4 hours PRN Dyspepsia  benzonatate 100 milliGRAM(s) Oral three times a day PRN Cough  ibuprofen  Tablet. 200 milliGRAM(s) Oral every 8 hours PRN Moderate Pain (4 - 6)  ondansetron Injectable 4 milliGRAM(s) IV Push four times a day PRN Nausea and/or Vomiting      LABS:                        12.7   4.53  )-----------( 222      ( 2019 16:58 )             39.1     -    141  |  101  |  21<H>  ----------------------------<  157<H>  4.3   |  24  |  0.9    Ca    8.7      2019 16:58    TPro  7.7  /  Alb  4.3  /  TBili  0.7  /  DBili  x   /  AST  36  /  ALT  30  /  AlkPhos  70  04-05    PT/INR - ( 2019 16:58 )   PT: 12.10 sec;   INR: 1.05 ratio         PTT - ( 2019 16:58 )  PTT:26.8 sec  Urinalysis Basic - ( 2019 19:30 )    Color: Yellow / Appearance: Clear / S.015 / pH: x  Gluc: x / Ketone: Trace  / Bili: Negative / Urobili: 0.2 mg/dL   Blood: x / Protein: 100 mg/dL / Nitrite: Negative   Leuk Esterase: Trace / RBC: 3-5 /HPF / WBC 3-5 /HPF   Sq Epi: x / Non Sq Epi: Few /HPF / Bacteria: Few      CAPILLARY BLOOD GLUCOSE                  RADIOLOGY & ADDITIONAL TESTS:    Imaging Personally Reviewed:  [ ] YES  [ ] NO    Consultant(s) Notes Reviewed:  [ ] YES  [ ] NO    Palliative Care:    Assessment & Plan:   HEALTH ISSUES - PROBLEM Dx:  PUD (peptic ulcer disease): PUD (peptic ulcer disease)  Chronic obstructive pulmonary disease: Chronic obstructive pulmonary disease  Osteoarthritis: Osteoarthritis  Diarrhea: Diarrhea  Nausea & vomiting: Nausea &amp; vomiting  Weakness: Weakness  ct ap early diverticulitis    plan------------    GI and neurology consult  IVF  not on IV antibiotic  if spike fever or increase wbc will start antibiotic     DVT ppx     Care Discussed with Consultants/Other Providers [ ] YES  [ ] NO

## 2019-04-06 NOTE — PHYSICAL THERAPY INITIAL EVALUATION ADULT - CRITERIA FOR SKILLED THERAPEUTIC INTERVENTIONS
impairments found/rehab potential/therapy frequency/predicted duration of therapy intervention/anticipated discharge recommendation

## 2019-04-07 LAB
AMMONIA BLD-MCNC: 20 UMOL/L — SIGNIFICANT CHANGE UP (ref 11–55)
ANION GAP SERPL CALC-SCNC: 12 MMOL/L — SIGNIFICANT CHANGE UP (ref 7–14)
BUN SERPL-MCNC: 12 MG/DL — SIGNIFICANT CHANGE UP (ref 10–20)
CALCIUM SERPL-MCNC: 8.4 MG/DL — LOW (ref 8.5–10.1)
CHLORIDE SERPL-SCNC: 109 MMOL/L — SIGNIFICANT CHANGE UP (ref 98–110)
CO2 SERPL-SCNC: 23 MMOL/L — SIGNIFICANT CHANGE UP (ref 17–32)
CREAT SERPL-MCNC: 0.9 MG/DL — SIGNIFICANT CHANGE UP (ref 0.7–1.5)
CULTURE RESULTS: SIGNIFICANT CHANGE UP
FOLATE SERPL-MCNC: 18.3 NG/ML — SIGNIFICANT CHANGE UP
GLUCOSE SERPL-MCNC: 93 MG/DL — SIGNIFICANT CHANGE UP (ref 70–99)
HCT VFR BLD CALC: 37.8 % — SIGNIFICANT CHANGE UP (ref 37–47)
HGB BLD-MCNC: 11.7 G/DL — LOW (ref 12–16)
MCHC RBC-ENTMCNC: 27.1 PG — SIGNIFICANT CHANGE UP (ref 27–31)
MCHC RBC-ENTMCNC: 31 G/DL — LOW (ref 32–37)
MCV RBC AUTO: 87.7 FL — SIGNIFICANT CHANGE UP (ref 81–99)
NRBC # BLD: 0 /100 WBCS — SIGNIFICANT CHANGE UP (ref 0–0)
PLATELET # BLD AUTO: 265 K/UL — SIGNIFICANT CHANGE UP (ref 130–400)
POTASSIUM SERPL-MCNC: 4.2 MMOL/L — SIGNIFICANT CHANGE UP (ref 3.5–5)
POTASSIUM SERPL-SCNC: 4.2 MMOL/L — SIGNIFICANT CHANGE UP (ref 3.5–5)
RBC # BLD: 4.31 M/UL — SIGNIFICANT CHANGE UP (ref 4.2–5.4)
RBC # FLD: 16.1 % — HIGH (ref 11.5–14.5)
SODIUM SERPL-SCNC: 144 MMOL/L — SIGNIFICANT CHANGE UP (ref 135–146)
SPECIMEN SOURCE: SIGNIFICANT CHANGE UP
TSH SERPL-MCNC: 0.55 UIU/ML — SIGNIFICANT CHANGE UP (ref 0.27–4.2)
VIT B12 SERPL-MCNC: 305 PG/ML — SIGNIFICANT CHANGE UP (ref 232–1245)
WBC # BLD: 5.88 K/UL — SIGNIFICANT CHANGE UP (ref 4.8–10.8)
WBC # FLD AUTO: 5.88 K/UL — SIGNIFICANT CHANGE UP (ref 4.8–10.8)

## 2019-04-07 RX ORDER — AZITHROMYCIN 500 MG/1
500 TABLET, FILM COATED ORAL EVERY 24 HOURS
Refills: 0 | Status: DISCONTINUED | OUTPATIENT
Start: 2019-04-07 | End: 2019-04-10

## 2019-04-07 RX ORDER — CEFTRIAXONE 500 MG/1
1 INJECTION, POWDER, FOR SOLUTION INTRAMUSCULAR; INTRAVENOUS EVERY 24 HOURS
Refills: 0 | Status: DISCONTINUED | OUTPATIENT
Start: 2019-04-07 | End: 2019-04-10

## 2019-04-07 RX ADMIN — HEPARIN SODIUM 5000 UNIT(S): 5000 INJECTION INTRAVENOUS; SUBCUTANEOUS at 05:30

## 2019-04-07 RX ADMIN — SODIUM CHLORIDE 75 MILLILITER(S): 9 INJECTION INTRAMUSCULAR; INTRAVENOUS; SUBCUTANEOUS at 03:37

## 2019-04-07 RX ADMIN — AZITHROMYCIN 255 MILLIGRAM(S): 500 TABLET, FILM COATED ORAL at 18:58

## 2019-04-07 RX ADMIN — Medication 3 MILLILITER(S): at 20:11

## 2019-04-07 RX ADMIN — CEFTRIAXONE 100 GRAM(S): 500 INJECTION, POWDER, FOR SOLUTION INTRAMUSCULAR; INTRAVENOUS at 18:20

## 2019-04-07 RX ADMIN — Medication 3 MILLILITER(S): at 07:57

## 2019-04-07 RX ADMIN — HEPARIN SODIUM 5000 UNIT(S): 5000 INJECTION INTRAVENOUS; SUBCUTANEOUS at 18:19

## 2019-04-07 RX ADMIN — PANTOPRAZOLE SODIUM 40 MILLIGRAM(S): 20 TABLET, DELAYED RELEASE ORAL at 05:31

## 2019-04-07 NOTE — CONSULT NOTE ADULT - SUBJECTIVE AND OBJECTIVE BOX
GI HPI:  Patient is a 89y old  Female who presents with a chief complaint of weakness/ (06 Apr 2019 16:52)  Pt is sitting up in a chair in no sig distress, with breakfast in front of her  Tolerating po diet with no vomiting or diarrhea as per RN today      Hospital course:  · Chief Complaint: The patient is a 89y Female complaining of weakness.	  · HPI Objective Statement: Patient is a 89 yo F w/ hx of COPD, sigmoidectomy s/p perforated diverticulitis p/w vomiting and weakness. Patient was treated with Abx for cough and congestion last week, then developed diarrhea; today, patient has been vomiting multiple times, has not been passing gas. Patient denies abdominal pain, fevers; last bowel movement was yesterday. Patient also endorses dry cough but denies SOB, chest pain. Denies dysuria or urinary frequency. Family bedside states patient appears weak and has a headache last week. Today patient denies headaches, denies changes in vision, denies numbness/tingling and focal weakness. (05 Apr 2019 20:47)      PAST MEDICAL & SURGICAL HISTORY  Osteoarthritis: as per hx  Diverticulosis: as per hx  Hiatal hernia: as per hx  PUD (peptic ulcer disease): protonix  Chronic obstructive pulmonary disease: albuterol neb  o2 nc  S/P laparoscopic-assisted sigmoidectomy: as per hx      FAMILY HISTORY:  FAMILY HISTORY:      SOCIAL HISTORY:  smoker:   Alcohol:  Drug:    ALLERGIES:  No Known Allergies      MEDICATIONS:  MEDICATIONS  (STANDING):  ALBUTerol/ipratropium for Nebulization 3 milliLiter(s) Nebulizer every 6 hours  buDESOnide 160 MICROgram(s)/formoterol 4.5 MICROgram(s) Inhaler 2 Puff(s) Inhalation two times a day  heparin  Injectable 5000 Unit(s) SubCutaneous every 12 hours  influenza   Vaccine 0.5 milliLiter(s) IntraMuscular once  pantoprazole    Tablet 40 milliGRAM(s) Oral before breakfast  sodium chloride 0.9%. 1000 milliLiter(s) (75 mL/Hr) IV Continuous <Continuous>  tiotropium 18 MICROgram(s) Capsule 1 Capsule(s) Inhalation daily    MEDICATIONS  (PRN):  acetaminophen   Tablet .. 650 milliGRAM(s) Oral every 6 hours PRN Temp greater or equal to 38C (100.4F), Mild Pain (1 - 3)  aluminum hydroxide/magnesium hydroxide/simethicone Suspension 30 milliLiter(s) Oral every 4 hours PRN Dyspepsia  benzonatate 100 milliGRAM(s) Oral three times a day PRN Cough  ibuprofen  Tablet. 200 milliGRAM(s) Oral every 8 hours PRN Moderate Pain (4 - 6)  ondansetron Injectable 4 milliGRAM(s) IV Push four times a day PRN Nausea and/or Vomiting      HOME MEDICATIONS:  Home Medications:  omeprazole:  (05 Apr 2019 22:46)      ROS:     REVIEW OF SYSTEMS  General:  No fevers  Eyes:  No reported pain   ENT:  No sore throat   NECK: No stiffness   CV:  No chest pain   Resp:  No shortness of breath  GI:  See HPI  :  No dysuria  Muscle:  No weakness  Neuro:  No tingling  Endocrine:  No polyuria  Heme:  No ecchymosis          VITALS:   T(F): 97.5 (04-07 @ 06:00), Max: 97.9 (04-06 @ 22:17)  HR: 86 (04-07 @ 06:00) (72 - 99)  BP: 129/62 (04-07 @ 06:00) (122/86 - 216/99)  BP(mean): --  RR: 16 (04-07 @ 06:00) (16 - 18)  SpO2: 97% (04-06 @ 09:52) (92% - 98%)    I&O's Summary    06 Apr 2019 07:01  -  07 Apr 2019 07:00  --------------------------------------------------------  IN: 1000 mL / OUT: 0 mL / NET: 1000 mL        PHYSICAL EXAM:  EYES: No scleral icterus   LUNG: Clear to auscultation bilaterally; No rales, rhonchi, wheezing, or rubs  HEART: RRR; No murmurs  ABDOMEN: Soft, +BS, no sig tenderness, No guarding, No Lowery Sign   Rectal Exam:     LABS:                        12.7   4.53  )-----------( 222      ( 05 Apr 2019 16:58 )             39.1     PT/INR - ( 05 Apr 2019 16:58 )  INR: 1.05          PTT - ( 05 Apr 2019 16:58 )  PTT:26.8<L>  LIVER FUNCTIONS - ( 05 Apr 2019 16:58 )  Alb: 4.3 g/dL / Pro: 7.7 g/dL / ALK PHOS: 70 U/L / ALT: 30 U/L / AST: 36 U/L / GGT: x           04-05    141  |  101  |  21<H>  ----------------------------<  157<H>  4.3   |  24  |  0.9    Ca    8.7      05 Apr 2019 16:58      CARDIAC MARKERS ( 05 Apr 2019 16:58 )  x     / <0.01 ng/mL / x     / x     / x              Previous EGD:    Previous colonoscopy:       RADIOLOGY:    CT from 4/5 reviewed

## 2019-04-07 NOTE — PROGRESS NOTE ADULT - SUBJECTIVE AND OBJECTIVE BOX
SUBJECTIVE:    Patient is a 89y old Female who presents with a chief complaint of weakness/ (2019 08:50)    Currently admitted to medicine with the primary diagnosis of Weakness     Today is hospital day 2d. This morning she is resting comfortably in bed and reports no new issues or overnight events.     PAST MEDICAL & SURGICAL HISTORY  Osteoarthritis: as per hx  Diverticulosis: as per hx  Hiatal hernia: as per hx  PUD (peptic ulcer disease): protonix  Chronic obstructive pulmonary disease: albuterol neb  o2 nc  S/P laparoscopic-assisted sigmoidectomy: as per hx    SOCIAL HISTORY:  Negative for smoking/alcohol/drug use.     ALLERGIES:  No Known Allergies    MEDICATIONS:  STANDING MEDICATIONS  ALBUTerol/ipratropium for Nebulization 3 milliLiter(s) Nebulizer every 6 hours  buDESOnide 160 MICROgram(s)/formoterol 4.5 MICROgram(s) Inhaler 2 Puff(s) Inhalation two times a day  heparin  Injectable 5000 Unit(s) SubCutaneous every 12 hours  influenza   Vaccine 0.5 milliLiter(s) IntraMuscular once  pantoprazole    Tablet 40 milliGRAM(s) Oral before breakfast  sodium chloride 0.9%. 1000 milliLiter(s) IV Continuous <Continuous>  tiotropium 18 MICROgram(s) Capsule 1 Capsule(s) Inhalation daily    PRN MEDICATIONS  acetaminophen   Tablet .. 650 milliGRAM(s) Oral every 6 hours PRN  aluminum hydroxide/magnesium hydroxide/simethicone Suspension 30 milliLiter(s) Oral every 4 hours PRN  benzonatate 100 milliGRAM(s) Oral three times a day PRN  ibuprofen  Tablet. 200 milliGRAM(s) Oral every 8 hours PRN  ondansetron Injectable 4 milliGRAM(s) IV Push four times a day PRN    VITALS:   T(F): 97.5  HR: 86  BP: 129/62  RR: 16  SpO2: --    LABS:                        12.7   4.53  )-----------( 222      ( 2019 16:58 )             39.1     04-05    141  |  101  |  21<H>  ----------------------------<  157<H>  4.3   |  24  |  0.9    Ca    8.7      2019 16:58    TPro  7.7  /  Alb  4.3  /  TBili  0.7  /  DBili  x   /  AST  36  /  ALT  30  /  AlkPhos  70  04-05    PT/INR - ( 2019 16:58 )   PT: 12.10 sec;   INR: 1.05 ratio         PTT - ( 2019 16:58 )  PTT:26.8 sec  Urinalysis Basic - ( 2019 19:30 )    Color: Yellow / Appearance: Clear / S.015 / pH: x  Gluc: x / Ketone: Trace  / Bili: Negative / Urobili: 0.2 mg/dL   Blood: x / Protein: 100 mg/dL / Nitrite: Negative   Leuk Esterase: Trace / RBC: 3-5 /HPF / WBC 3-5 /HPF   Sq Epi: x / Non Sq Epi: Few /HPF / Bacteria: Few            Culture - Urine (collected 2019 20:43)  Source: .Urine Catheterized  Final Report (2019 07:37):    >=3 organisms. Probable collection contamination.    Culture - Blood (collected 2019 16:58)  Source: .Blood Blood  Preliminary Report (2019 01:01):    No growth to date.      CARDIAC MARKERS ( 2019 16:58 )  x     / <0.01 ng/mL / x     / x     / x          RADIOLOGY:    PHYSICAL EXAM:  GEN: No acute distress  LUNGS: Clear to auscultation bilaterally   HEART: Regular  ABD: Soft, non-tender, non-distended.  EXT: NC/NC/NE/2+PP/BIRCH/Skin Intact.   NEURO: dementia    Intravenous access:   NG tube:   Turner Catheter:

## 2019-04-07 NOTE — CONSULT NOTE ADULT - SUBJECTIVE AND OBJECTIVE BOX
Neurology Consult    Patient is a 89y old  Female who presents with a chief complaint of weakness/ (2019 10:03)    HPI:  · Chief Complaint: The patient is a 89y Female complaining of weakness.	  · HPI Objective Statement: Patient is a 89 yo F w/ hx of COPD, sigmoidectomy s/p perforated diverticulitis p/w vomiting and weakness. Patient was treated with Abx for cough and congestion last week, then developed diarrhea; today, patient has been vomiting multiple times, has not been passing gas. Patient denies abdominal pain, fevers; last bowel movement was yesterday. Patient also endorses dry cough but denies SOB, chest pain. Denies dysuria or urinary frequency. Family bedside states patient appears weak and has a headache last week. Today patient denies headaches, denies changes in vision, denies numbness/tingling and focal weakness. (2019 20:47)    PAST MEDICAL & SURGICAL HISTORY:  Osteoarthritis: as per hx  Diverticulosis: as per hx  Hiatal hernia: as per hx  PUD (peptic ulcer disease): protonix  Chronic obstructive pulmonary disease: albuterol neb  o2 nc  S/P laparoscopic-assisted sigmoidectomy: as per hx      FAMILY HISTORY:      Social History: (-) x 3    Allergies    No Known Allergies    Intolerances        MEDICATIONS  (STANDING):  ALBUTerol/ipratropium for Nebulization 3 milliLiter(s) Nebulizer every 6 hours  buDESOnide 160 MICROgram(s)/formoterol 4.5 MICROgram(s) Inhaler 2 Puff(s) Inhalation two times a day  heparin  Injectable 5000 Unit(s) SubCutaneous every 12 hours  influenza   Vaccine 0.5 milliLiter(s) IntraMuscular once  pantoprazole    Tablet 40 milliGRAM(s) Oral before breakfast  tiotropium 18 MICROgram(s) Capsule 1 Capsule(s) Inhalation daily    MEDICATIONS  (PRN):  acetaminophen   Tablet .. 650 milliGRAM(s) Oral every 6 hours PRN Temp greater or equal to 38C (100.4F), Mild Pain (1 - 3)  aluminum hydroxide/magnesium hydroxide/simethicone Suspension 30 milliLiter(s) Oral every 4 hours PRN Dyspepsia  benzonatate 100 milliGRAM(s) Oral three times a day PRN Cough  ibuprofen  Tablet. 200 milliGRAM(s) Oral every 8 hours PRN Moderate Pain (4 - 6)  ondansetron Injectable 4 milliGRAM(s) IV Push four times a day PRN Nausea and/or Vomiting      Review of systems:    Constitutional: as per HPI  Eyes: No eye pain or discharge  ENMT:  No difficulty hearing; No sinus or throat pain  Neck: No pain or stiffness  Respiratory: No cough, wheezing, chills or hemoptysis  Cardiovascular: No chest pain, palpitations, shortness of breath, dyspnea on exertion  Gastrointestinal: No abdominal pain, nausea, vomiting or hematemesis; No diarrhea or constipation.   Genitourinary: No dysuria, frequency, hematuria or incontinence  Neurological: As per HPI  Skin: No rashes or lesions   Endocrine: No heat or cold intolerance; No hair loss  Musculoskeletal: No joint pain or swelling  Psychiatric: No depression, anxiety, mood swings  Heme/Lymph: No easy bruising or bleeding gums    Vital Signs Last 24 Hrs  T(C): 36.4 (2019 06:00), Max: 36.6 (2019 22:17)  T(F): 97.5 (2019 06:00), Max: 97.9 (2019 22:17)  HR: 86 (2019 06:00) (86 - 99)  BP: 129/62 (2019 06:00) (122/86 - 141/65)  BP(mean): --  RR: 16 (2019 06:00) (16 - 16)  SpO2: --    Examination:  General:  Appearance is consistent with chronologic age.  No abnormal facies.  Gross skin survey within normal limits.    Cognitive/Language:  The patient is oriented to person, place, time and date.  Recent and remote memory intact.  Fund of knowledge is intact and normal.  Language with normal repetition, comprehension and naming.  Nondysarthric.    Eyes: intact VA, VFF.  EOMI w/o nystagmus, skew or reported double vision.  PERRL.  No ptosis/weakness of eyelid closure.    Face:  Facial sensation normal V1 - 3, no facial asymmetry.    Ears/Nose/Throat:  Hearing grossly intact b/l.  Palate elevates midline.  Tongue and uvula midline.   Motor examination:   Normal tone, bulk and range of motion.  No tenderness, twitching, tremors or involuntary movements.  Formal Muscle Strength Testing: (MRC grade R/L) 5/5 UE; 5/5 LE.  No observable drift.  Reflexes:   2+ b/l pectoralis, biceps, triceps, brachioradialis, patella and Achilles.  Plantar response downgoing b/l.  Jaw jerk, Renee, clonus absent.  Sensory examination:   Intact to light touch and pinprick, pain, temperature and proprioception and vibration in all extremities.  Cerebellum:   FTN/HKS intact with normal JANES in all limbs.  No dysmetria or dysdiadokinesia.  Gait narrow based and normal.    Respiratory:  no audible wheezing or inspiratory stridor.  no use of accessory muscles.   Cardiac: pulse palpable, no audible bruits  Abdomen: supple, no guarding, no TTP    Labs:   CBC Full  -  ( 2019 10:44 )  WBC Count : 5.88 K/uL  RBC Count : 4.31 M/uL  Hemoglobin : 11.7 g/dL  Hematocrit : 37.8 %  Platelet Count - Automated : 265 K/uL  Mean Cell Volume : 87.7 fL  Mean Cell Hemoglobin : 27.1 pg  Mean Cell Hemoglobin Concentration : 31.0 g/dL  Auto Neutrophil # : x  Auto Lymphocyte # : x  Auto Monocyte # : x  Auto Eosinophil # : x  Auto Basophil # : x  Auto Neutrophil % : x  Auto Lymphocyte % : x  Auto Monocyte % : x  Auto Eosinophil % : x  Auto Basophil % : x    04-    144  |  109  |  12  ----------------------------<  93  4.2   |  23  |  0.9    Ca    8.4<L>      2019 10:44    TPro  7.7  /  Alb  4.3  /  TBili  0.7  /  DBili  x   /  AST  36  /  ALT  30  /  AlkPhos  70  04-05    LIVER FUNCTIONS - ( 2019 16:58 )  Alb: 4.3 g/dL / Pro: 7.7 g/dL / ALK PHOS: 70 U/L / ALT: 30 U/L / AST: 36 U/L / GGT: x           PT/INR - ( 2019 16:58 )   PT: 12.10 sec;   INR: 1.05 ratio         PTT - ( 2019 16:58 )  PTT:26.8 sec  Urinalysis Basic - ( 2019 19:30 )    Color: Yellow / Appearance: Clear / S.015 / pH: x  Gluc: x / Ketone: Trace  / Bili: Negative / Urobili: 0.2 mg/dL   Blood: x / Protein: 100 mg/dL / Nitrite: Negative   Leuk Esterase: Trace / RBC: 3-5 /HPF / WBC 3-5 /HPF   Sq Epi: x / Non Sq Epi: Few /HPF / Bacteria: Few          [facility]  [facility]     19 @ 12:47 Neurology Consult    Patient is a 89y old  Female who presents with a chief complaint of weakness/ (2019 10:03)    HPI:  Patient is a 89 yo F w/ hx of COPD, sigmoidectomy s/p perforated diverticulitis p/w vomiting and weakness currently admitted for worsening lethargy and failure to thrive.  According to daughter patient has been having intractable productive cough started on antibiotics recently by PMD.  has been feeling more withdrawn with decreased appetite and emotional lability after her  passed away 6 months ago.  Has been noncompliant with medications.  Tremors noted by family on admission only.  Denies tremors prior to hospitalization.  Has had a few episodes of falling while using walker but family denies any incoordination, vertigo or ataxia.  No bradykinesia.  No drop attacks.  Other than antibiotics, family denies any new medications.  Family denies any diarrhea or vomiting.      PAST MEDICAL & SURGICAL HISTORY:  Osteoarthritis: as per hx  Diverticulosis: as per hx  Hiatal hernia: as per hx  PUD (peptic ulcer disease): protonix  Chronic obstructive pulmonary disease: albuterol neb  o2 nc  S/P laparoscopic-assisted sigmoidectomy: as per hx    FAMILY HISTORY:    Social History: (-) x 3    Allergies    No Known Allergies    Intolerances    MEDICATIONS  (STANDING):  ALBUTerol/ipratropium for Nebulization 3 milliLiter(s) Nebulizer every 6 hours  buDESOnide 160 MICROgram(s)/formoterol 4.5 MICROgram(s) Inhaler 2 Puff(s) Inhalation two times a day  heparin  Injectable 5000 Unit(s) SubCutaneous every 12 hours  influenza   Vaccine 0.5 milliLiter(s) IntraMuscular once  pantoprazole    Tablet 40 milliGRAM(s) Oral before breakfast  tiotropium 18 MICROgram(s) Capsule 1 Capsule(s) Inhalation daily    MEDICATIONS  (PRN):  acetaminophen   Tablet .. 650 milliGRAM(s) Oral every 6 hours PRN Temp greater or equal to 38C (100.4F), Mild Pain (1 - 3)  aluminum hydroxide/magnesium hydroxide/simethicone Suspension 30 milliLiter(s) Oral every 4 hours PRN Dyspepsia  benzonatate 100 milliGRAM(s) Oral three times a day PRN Cough  ibuprofen  Tablet. 200 milliGRAM(s) Oral every 8 hours PRN Moderate Pain (4 - 6)  ondansetron Injectable 4 milliGRAM(s) IV Push four times a day PRN Nausea and/or Vomiting    Review of systems:    Constitutional: as per HPI  Eyes: No eye pain or discharge  ENMT:  No difficulty hearing; No sinus or throat pain  Neck: No pain or stiffness  Respiratory: as per HPI  Cardiovascular: No chest pain, palpitations, shortness of breath, dyspnea on exertion  Gastrointestinal: No abdominal pain, nausea, vomiting or hematemesis; No diarrhea or constipation.   Genitourinary: No dysuria, frequency, hematuria or incontinence  Neurological: As per HPI  Skin: No rashes or lesions   Endocrine: No heat or cold intolerance; No hair loss  Musculoskeletal: No joint pain or swelling  Psychiatric: as per HPI  Heme/Lymph: No easy bruising or bleeding gums    Vital Signs Last 24 Hrs  T(C): 36.4 (2019 06:00), Max: 36.6 (2019 22:17)  T(F): 97.5 (2019 06:00), Max: 97.9 (2019 22:17)  HR: 86 (2019 06:00) (86 - 99)  BP: 129/62 (2019 06:00) (122/86 - 141/65)  BP(mean): --  RR: 16 (2019 06:00) (16 - 16)  SpO2: --    Examination:  General:  Appearance is consistent with chronologic age.  No abnormal facies.  Gross skin survey within normal limits.    Cognitive/Language:  The patient is oriented to person, place.  Recent and remote memory intact.  Fund of knowledge is intact and normal.  Language with normal repetition, comprehension and naming.  Nondysarthric.    Eyes: intact VA, VFF.  EOMI w/o nystagmus, skew or reported double vision.  PERRL.  No ptosis/weakness of eyelid closure.    Face:  Facial sensation normal V1 - 3, no facial asymmetry.    Ears/Nose/Throat:  Hearing grossly intact b/l.  Palate elevates midline.  Tongue and uvula midline.   Motor examination:   Normal tone, bulk and range of motion.  slight high frequency low amplitude tremor.  no bradykinesia or cogwheeling.  slight asterixis  Formal Muscle Strength Testing: (MRC grade R/L) 5/5 UE; 5/5 LE.  No observable drift.  Reflexes:   2+ b/l pectoralis, biceps, triceps, brachioradialis, patella and Achilles.  Plantar response downgoing b/l.  Jaw jerk, Renee, clonus absent.  Sensory examination:   Intact to light touch and pinprick, pain, temperature and proprioception and vibration in all extremities.  Cerebellum:   FTN/HKS intact with normal JANES in all limbs.  No dysmetria or dysdiadokinesia.  Gait N/a.    Respiratory:  productive cough  Cardiac: pulse palpable, no audible bruits  Abdomen: supple, no guarding, no TTP    Labs:   CBC Full  -  ( 2019 10:44 )  WBC Count : 5.88 K/uL  RBC Count : 4.31 M/uL  Hemoglobin : 11.7 g/dL  Hematocrit : 37.8 %  Platelet Count - Automated : 265 K/uL  Mean Cell Volume : 87.7 fL  Mean Cell Hemoglobin : 27.1 pg  Mean Cell Hemoglobin Concentration : 31.0 g/dL        144  |  109  |  12  ----------------------------<  93  4.2   |  23  |  0.9    Ca    8.4<L>      2019 10:44    TPro  7.7  /  Alb  4.3  /  TBili  0.7  /  DBili  x   /  AST  36  /  ALT  30  /  AlkPhos  70  04-05    LIVER FUNCTIONS - ( 2019 16:58 )  Alb: 4.3 g/dL / Pro: 7.7 g/dL / ALK PHOS: 70 U/L / ALT: 30 U/L / AST: 36 U/L / GGT: x           PT/INR - ( 2019 16:58 )   PT: 12.10 sec;   INR: 1.05 ratio       PTT - ( 2019 16:58 )  PTT:26.8 sec  Urinalysis Basic - ( 2019 19:30 )    Color: Yellow / Appearance: Clear / S.015 / pH: x  Gluc: x / Ketone: Trace  / Bili: Negative / Urobili: 0.2 mg/dL   Blood: x / Protein: 100 mg/dL / Nitrite: Negative   Leuk Esterase: Trace / RBC: 3-5 /HPF / WBC 3-5 /HPF   Sq Epi: x / Non Sq Epi: Few /HPF / Bacteria: Few

## 2019-04-08 RX ADMIN — Medication 3 MILLILITER(S): at 07:16

## 2019-04-08 RX ADMIN — AZITHROMYCIN 255 MILLIGRAM(S): 500 TABLET, FILM COATED ORAL at 18:07

## 2019-04-08 RX ADMIN — Medication 3 MILLILITER(S): at 20:00

## 2019-04-08 RX ADMIN — PANTOPRAZOLE SODIUM 40 MILLIGRAM(S): 20 TABLET, DELAYED RELEASE ORAL at 05:18

## 2019-04-08 RX ADMIN — HEPARIN SODIUM 5000 UNIT(S): 5000 INJECTION INTRAVENOUS; SUBCUTANEOUS at 17:23

## 2019-04-08 RX ADMIN — CEFTRIAXONE 100 GRAM(S): 500 INJECTION, POWDER, FOR SOLUTION INTRAMUSCULAR; INTRAVENOUS at 17:22

## 2019-04-08 RX ADMIN — HEPARIN SODIUM 5000 UNIT(S): 5000 INJECTION INTRAVENOUS; SUBCUTANEOUS at 05:18

## 2019-04-08 RX ADMIN — Medication 100 MILLIGRAM(S): at 18:07

## 2019-04-08 NOTE — CONSULT NOTE ADULT - SUBJECTIVE AND OBJECTIVE BOX
Reviewed and referred to chart notes.    pt is laert ox3 and comfortable in the chair. reports to be feeling fine. she does not voice any psych complaints. admits feeling sad and depressed episodically, brief, not intense, context related response, no loss of interest noted, she is interested in activities with family. no vegetative sx of depression.     no prior psych hx.    alert ox3 no mood mostly stable no anxiety no evidence of psychosis  no threat to self or others. i/j fair

## 2019-04-08 NOTE — CONSULT NOTE ADULT - SUBJECTIVE AND OBJECTIVE BOX
89 yo F with h/o sigmoid resection for diverticulitis and Crohn's at age 75.  No problems until recently when she developed cough and vomitting.  Vomiting has actually resolved - pt not eating that well at this time

## 2019-04-08 NOTE — PROGRESS NOTE ADULT - SUBJECTIVE AND OBJECTIVE BOX
Patient is a 89y old  Female who presents with a chief complaint of weakness/ (07 Apr 2019 12:46)      HPI:  · Chief Complaint: The patient is a 89y Female complaining of weakness.	  · HPI Objective Statement: Patient is a 89 yo F w/ hx of COPD, sigmoidectomy s/p perforated diverticulitis p/w vomiting and weakness. Patient was treated with Abx for cough and congestion last week, then developed diarrhea; today, patient has been vomiting multiple times, has not been passing gas. Patient denies abdominal pain, fevers; last bowel movement was yesterday. Patient also endorses dry cough but denies SOB, chest pain. Denies dysuria or urinary frequency. Family bedside states patient appears weak and has a headache last week. Today patient denies headaches, denies changes in vision, denies numbness/tingling and focal weakness. (05 Apr 2019 20:47)      INTERVAL HPI/OVERNIGHT EVENTS:  T(C): 36.1 (04-08-19 @ 06:00), Max: 36.4 (04-07-19 @ 13:56)  HR: 77 (04-08-19 @ 06:00) (77 - 93)  BP: 179/72 (04-08-19 @ 06:00) (128/59 - 183/80)  RR: 16 (04-08-19 @ 06:00) (16 - 16)  SpO2: --  Wt(kg): --  I&O's Summary        PHYSICAL EXAM:  GENERAL: NAD, well-groomed, well-developed  HEAD:  Atraumatic, Normocephalic  EYES: EOMI, PERRLA, conjunctiva and sclera clear  ENMT: No tonsillar erythema, exudates, or enlargement; Moist mucous membranes, Good dentition, No lesions  NECK: Supple, No JVD, Normal thyroi  CHEST/LUNG: Clear to percussion bilaterally; No rales, rhonchi, wheezing, or rubs  HEART: Regular rate and rhythm; No murmurs, rubs, or gallops  ABDOMEN: Soft, Nontender, Nondistended; Bowel sounds present  EXTREMITIES:  2+ Peripheral Pulses, No clubbing, cyanosis, or edema  LYMPH: No lymphadenopathy noted  SKIN: No rashes or lesions    MEDICATIONS  (STANDING):  ALBUTerol/ipratropium for Nebulization 3 milliLiter(s) Nebulizer every 6 hours  azithromycin  IVPB 500 milliGRAM(s) IV Intermittent every 24 hours  buDESOnide 160 MICROgram(s)/formoterol 4.5 MICROgram(s) Inhaler 2 Puff(s) Inhalation two times a day  cefTRIAXone   IVPB 1 Gram(s) IV Intermittent every 24 hours  heparin  Injectable 5000 Unit(s) SubCutaneous every 12 hours  pantoprazole    Tablet 40 milliGRAM(s) Oral before breakfast  tiotropium 18 MICROgram(s) Capsule 1 Capsule(s) Inhalation daily    MEDICATIONS  (PRN):  acetaminophen   Tablet .. 650 milliGRAM(s) Oral every 6 hours PRN Temp greater or equal to 38C (100.4F), Mild Pain (1 - 3)  aluminum hydroxide/magnesium hydroxide/simethicone Suspension 30 milliLiter(s) Oral every 4 hours PRN Dyspepsia  benzonatate 100 milliGRAM(s) Oral three times a day PRN Cough  ibuprofen  Tablet. 200 milliGRAM(s) Oral every 8 hours PRN Moderate Pain (4 - 6)  ondansetron Injectable 4 milliGRAM(s) IV Push four times a day PRN Nausea and/or Vomiting      LABS:                        11.7   5.88  )-----------( 265      ( 07 Apr 2019 10:44 )             37.8     04-07    144  |  109  |  12  ----------------------------<  93  4.2   |  23  |  0.9    Ca    8.4<L>      07 Apr 2019 10:44          CAPILLARY BLOOD GLUCOSE          04-05 @ 20:43   >=3 organisms. Probable collection contamination.  --  --  04-05 @ 16:58   No growth to date.  --  --          RADIOLOGY & ADDITIONAL TESTS:  chest x ray right upper lobe opacity    Imaging Personally Reviewed:  [xx ] YES  [ ] NO      Consultant(s) Notes Reviewed:  [xx ] YES  [ ] NO  GI and Neurology       Assessment & Plan:   HEALTH ISSUES - PROBLEM Dx:  PUD (peptic ulcer disease): PUD (peptic ulcer disease)  Chronic obstructive pulmonary disease: Chronic obstructive pulmonary disease  Osteoarthritis: Osteoarthritis  Diarrhea: Diarrhea  Nausea & vomiting: Nausea &amp; vomiting  Weakness: Weakness  *right upper lobe opacity  *Tremor-- metabolic TSH B12 ammonia normal   *mild diverticulitis     plan---------------------------    continue IV antibiotic  pulmonary consult  d/w family            DVT ppx     Care Discussed with Consultants/Other Providers [ ] YES  [ ] NO Patient is a 89y old  Female who presents with a chief complaint of weakness/ (07 Apr 2019 12:46)      HPI:  · Chief Complaint: The patient is a 89y Female complaining of weakness.	  · HPI Objective Statement: Patient is a 89 yo F w/ hx of COPD, sigmoidectomy s/p perforated diverticulitis p/w vomiting and weakness. Patient was treated with Abx for cough and congestion last week, then developed diarrhea; today, patient has been vomiting multiple times, has not been passing gas. Patient denies abdominal pain, fevers; last bowel movement was yesterday. Patient also endorses dry cough but denies SOB, chest pain. Denies dysuria or urinary frequency. Family bedside states patient appears weak and has a headache last week. Today patient denies headaches, denies changes in vision, denies numbness/tingling and focal weakness. (05 Apr 2019 20:47)      INTERVAL HPI/OVERNIGHT EVENTS:  T(C): 36.1 (04-08-19 @ 06:00), Max: 36.4 (04-07-19 @ 13:56)  HR: 77 (04-08-19 @ 06:00) (77 - 93)  BP: 179/72 (04-08-19 @ 06:00) (128/59 - 183/80)  RR: 16 (04-08-19 @ 06:00) (16 - 16)  SpO2: --  Wt(kg): --  I&O's Summary        PHYSICAL EXAM:  GENERAL: NAD, well-groomed, well-developed  HEAD:  Atraumatic, Normocephalic  EYES: EOMI, PERRLA, conjunctiva and sclera clear  ENMT: No tonsillar erythema, exudates, or enlargement; Moist mucous membranes, Good dentition, No lesions  NECK: Supple, No JVD, Normal thyroi  CHEST/LUNG: Clear to percussion bilaterally; No rales, rhonchi, wheezing, or rubs  HEART: Regular rate and rhythm; No murmurs, rubs, or gallops  ABDOMEN: Soft, Nontender, Nondistended; Bowel sounds present  EXTREMITIES:  2+ Peripheral Pulses, No clubbing, cyanosis, or edema  LYMPH: No lymphadenopathy noted  SKIN: No rashes or lesions    MEDICATIONS  (STANDING):  ALBUTerol/ipratropium for Nebulization 3 milliLiter(s) Nebulizer every 6 hours  azithromycin  IVPB 500 milliGRAM(s) IV Intermittent every 24 hours  buDESOnide 160 MICROgram(s)/formoterol 4.5 MICROgram(s) Inhaler 2 Puff(s) Inhalation two times a day  cefTRIAXone   IVPB 1 Gram(s) IV Intermittent every 24 hours  heparin  Injectable 5000 Unit(s) SubCutaneous every 12 hours  pantoprazole    Tablet 40 milliGRAM(s) Oral before breakfast  tiotropium 18 MICROgram(s) Capsule 1 Capsule(s) Inhalation daily    MEDICATIONS  (PRN):  acetaminophen   Tablet .. 650 milliGRAM(s) Oral every 6 hours PRN Temp greater or equal to 38C (100.4F), Mild Pain (1 - 3)  aluminum hydroxide/magnesium hydroxide/simethicone Suspension 30 milliLiter(s) Oral every 4 hours PRN Dyspepsia  benzonatate 100 milliGRAM(s) Oral three times a day PRN Cough  ibuprofen  Tablet. 200 milliGRAM(s) Oral every 8 hours PRN Moderate Pain (4 - 6)  ondansetron Injectable 4 milliGRAM(s) IV Push four times a day PRN Nausea and/or Vomiting      LABS:                        11.7   5.88  )-----------( 265      ( 07 Apr 2019 10:44 )             37.8     04-07    144  |  109  |  12  ----------------------------<  93  4.2   |  23  |  0.9    Ca    8.4<L>      07 Apr 2019 10:44          CAPILLARY BLOOD GLUCOSE          04-05 @ 20:43   >=3 organisms. Probable collection contamination.  --  --  04-05 @ 16:58   No growth to date.  --  --          RADIOLOGY & ADDITIONAL TESTS:  chest x ray right upper lobe opacity    Imaging Personally Reviewed:  [xx ] YES  [ ] NO      Consultant(s) Notes Reviewed:  [xx ] YES  [ ] NO  GI and Neurology       Assessment & Plan:   HEALTH ISSUES - PROBLEM Dx:  PUD (peptic ulcer disease): PUD (peptic ulcer disease)  Chronic obstructive pulmonary disease: Chronic obstructive pulmonary disease  Osteoarthritis: Osteoarthritis  Diarrhea: Diarrhea  Nausea & vomiting: Nausea &amp; vomiting  Weakness: Weakness  *right upper lobe opacity  *Tremor-- metabolic TSH B12 ammonia normal   *mild diverticulitis     plan---------------------------    continue IV antibiotic  pulmonary consult  d/w family  Liliana all lab and all question answer given my office number can call me any time           DVT ppx     Care Discussed with Consultants/Other Providers [ ] YES  [ ] NO

## 2019-04-09 PROCEDURE — 71045 X-RAY EXAM CHEST 1 VIEW: CPT | Mod: 26

## 2019-04-09 RX ADMIN — HEPARIN SODIUM 5000 UNIT(S): 5000 INJECTION INTRAVENOUS; SUBCUTANEOUS at 17:03

## 2019-04-09 RX ADMIN — PANTOPRAZOLE SODIUM 40 MILLIGRAM(S): 20 TABLET, DELAYED RELEASE ORAL at 05:17

## 2019-04-09 RX ADMIN — HEPARIN SODIUM 5000 UNIT(S): 5000 INJECTION INTRAVENOUS; SUBCUTANEOUS at 05:17

## 2019-04-09 RX ADMIN — CEFTRIAXONE 100 GRAM(S): 500 INJECTION, POWDER, FOR SOLUTION INTRAMUSCULAR; INTRAVENOUS at 17:01

## 2019-04-09 RX ADMIN — AZITHROMYCIN 255 MILLIGRAM(S): 500 TABLET, FILM COATED ORAL at 19:22

## 2019-04-09 RX ADMIN — Medication 200 MILLIGRAM(S): at 10:49

## 2019-04-09 RX ADMIN — Medication 0.1 MILLIGRAM(S): at 06:05

## 2019-04-09 NOTE — PROGRESS NOTE ADULT - SUBJECTIVE AND OBJECTIVE BOX
Addendum to earlier CONSULTATION note    Xray Chest 1 View AP/PA (04.09.19 @ 14:56)   No radiographic evidence of acute cardiopulmonary disease.      IMPRESSION:  No right lung opacity as was reported on the prior chest x ray  The findings on the prior study were due to patient positioning  No further testing or intervention advised    OK for DC home from a pulmonary perspective

## 2019-04-09 NOTE — CONSULT NOTE ADULT - ASSESSMENT
Pt is a 89 YOF with mild diverticulitis, currently appears stable  - continue po diet as tolerated  - follow up labs  - PPI QD
IMPRESSION:  89 yo female non smoker with a history of COPD and known fibrotic and bronchiectatic changes in the upper lobe noted on CT of the neck from 2018 (as above)  Current CXR is rotated RPO and reporting possible RUL opacity which may be related to underlying fibrosis and RPO projection and confluence of vascular and parenchymal shadows  Mild cough related to history of COPD/bronchiectasis      PLAN:  Repeat CXR assuring AP projection without rotation  Consider CT if opacity persists on better quality CXR  Continue Symbicort Spiriva and nebulizer therapy as ordered  Aspiration precautions elevated HOB  O2 via NC if needed to keep saturation > 90%  DVT prophylaxis
no evidence of major depression    pt may not benefit from antidepressant medication at this time.
IMP;  Probable acute gastroenteritis    Plan continue to increase diet as tolerated.  If remains asymptomatic would try to be conservative in this 89 yo
89 yo F admitted for cough with FTT likely adjustment ds/depression currently with mild intention tremor likely toxic/metabolic.     Plan:  - check TSH, ammonia, B12, folate  - check for source of productive cough  - treat with antibiotics appropriately  - psychiatry eval for possible antidepressant  - PT  - call back as needed

## 2019-04-09 NOTE — CONSULT NOTE ADULT - SUBJECTIVE AND OBJECTIVE BOX
Patient is a 89y old female who presents with a chief complaint of weakness/ (08 Apr 2019 15:04)    HPI:  The patient is a 89 yo F w/ hx of COPD, sigmoidectomy s/p perforated diverticulitis p/w vomiting and weakness.   Patient was recently treated with Abx for cough and congestion one week PTA, then developed diarrhea.  On admission, patient had been vomiting multiple times, has not been passing gas.   Patient denied abdominal pain, fevers; last bowel movement was 1 day PTA.   Patient also reports dry cough but denies SOB, chest pain.   Denies dysuria or urinary frequency.   Family reported that the patient appears weak and has a headache last week.   On admission, patient denies headaches, denies changes in vision, denies numbness/tingling and focal weakness. (05 Apr 2019 20:47)    PAST MEDICAL & SURGICAL HISTORY:  Osteoarthritis: as per hx  Diverticulosis: as per hx  Hiatal hernia: as per hx  PUD (peptic ulcer disease): Protonix  Chronic obstructive pulmonary disease  S/P laparoscopic-assisted sigmoidectomy: as per hx    Smoking History:  Non smoker    Review of Systems:  Cough, dry, no dyspnea  No fever chills  No pain  No edema orthopnea  No HA  No dysuria    Allergies:  No Known Allergies    MEDICATIONS  (STANDING):  ALBUTerol/ipratropium for Nebulization 3 milliLiter(s) Nebulizer every 6 hours  azithromycin  IVPB 500 milliGRAM(s) IV Intermittent every 24 hours  buDESOnide 160 MICROgram(s)/formoterol 4.5 MICROgram(s) Inhaler 2 Puff(s) Inhalation two times a day  cefTRIAXone   IVPB 1 Gram(s) IV Intermittent every 24 hours  heparin  Injectable 5000 Unit(s) SubCutaneous every 12 hours  pantoprazole    Tablet 40 milliGRAM(s) Oral before breakfast  tiotropium 18 MICROgram(s) Capsule 1 Capsule(s) Inhalation daily    MEDICATIONS  (PRN):  acetaminophen   Tablet .. 650 milliGRAM(s) Oral every 6 hours PRN Temp greater or equal to 38C (100.4F), Mild Pain (1 - 3)  aluminum hydroxide/magnesium hydroxide/simethicone Suspension 30 milliLiter(s) Oral every 4 hours PRN Dyspepsia  benzonatate 100 milliGRAM(s) Oral three times a day PRN Cough  ibuprofen  Tablet. 200 milliGRAM(s) Oral every 8 hours PRN Moderate Pain (4 - 6)  ondansetron Injectable 4 milliGRAM(s) IV Push four times a day PRN Nausea and/or Vomiting    PHYSICAL EXAM  Vital Signs Last 24 Hrs  T(F): 98 (09 Apr 2019 06:01), Max: 98.1 (08 Apr 2019 22:06)  HR: 90 (09 Apr 2019 06:01) (89 - 96)  BP: 180/80 (09 Apr 2019 06:01) (145/69 - 180/80)  RR: 16 (09 Apr 2019 06:01) (16 - 16)  SpO2: -- 2 lpm NC    Awake and alert NAD  Neck supple, no LN  S1 and S2 no murmur  Lungs are clear without wheeze, rhonchi or rales  Abdomen is soft and non tender  There is no edema  Neurologically non focal    LABS:                        11.7   5.88  )-----------( 265      ( 07 Apr 2019 10:44 )             37.8                                               04-07    144  |  109  |  12  ----------------------------<  93  4.2   |  23  |  0.9    Ca    8.4<L>      07 Apr 2019 10:44    Blood Gas Profile - Venous (04.05.19 @ 16:55)    pH, Venous: 7.37    pCO2, Venous: 43 mmHg    pO2, Venous: 53 mmHg    HCO3, Venous: 25 mmoL/L    Base Excess, Venous: -0.8 mmoL/L    Oxygen Saturation, Venous: 85 %    FIO2, Venous: 21                             RADIOGRAPHS:  < from: CT Neck Soft Tissue w/ IV Cont (06.29.18 @ 19:41) >  Fibrotic changes and bronchiectasis within the bilateral upper lobes.  Left submandibular gland sialoadenitis with a left submandibular duct   stone. Extensive inflammatory changes within the left side of the neck,   left floor of mouth, and left pharynx.    < from: Xray Chest 1 View- PORTABLE-Urgent (04.05.19 @ 17:23) >  Rotated single view RPO projection  Right upper lobe opacity

## 2019-04-09 NOTE — PROGRESS NOTE ADULT - SUBJECTIVE AND OBJECTIVE BOX
SUBJECTIVE:    Patient is a 89y old Female who presents with a chief complaint of weakness/ (09 Apr 2019 06:31)    Currently admitted to medicine with the primary diagnosis of Weakness     Today is hospital day 4d. This morning she is resting comfortably in bed and reports no new issues or overnight events.     PAST MEDICAL & SURGICAL HISTORY  Osteoarthritis: as per hx  Diverticulosis: as per hx  Hiatal hernia: as per hx  PUD (peptic ulcer disease): protonix  Chronic obstructive pulmonary disease: albuterol neb  o2 nc  S/P laparoscopic-assisted sigmoidectomy: as per hx    SOCIAL HISTORY:  Negative for smoking/alcohol/drug use.     ALLERGIES:  No Known Allergies    MEDICATIONS:  STANDING MEDICATIONS  ALBUTerol/ipratropium for Nebulization 3 milliLiter(s) Nebulizer every 6 hours  azithromycin  IVPB 500 milliGRAM(s) IV Intermittent every 24 hours  buDESOnide 160 MICROgram(s)/formoterol 4.5 MICROgram(s) Inhaler 2 Puff(s) Inhalation two times a day  cefTRIAXone   IVPB 1 Gram(s) IV Intermittent every 24 hours  heparin  Injectable 5000 Unit(s) SubCutaneous every 12 hours  pantoprazole    Tablet 40 milliGRAM(s) Oral before breakfast  tiotropium 18 MICROgram(s) Capsule 1 Capsule(s) Inhalation daily    PRN MEDICATIONS  acetaminophen   Tablet .. 650 milliGRAM(s) Oral every 6 hours PRN  aluminum hydroxide/magnesium hydroxide/simethicone Suspension 30 milliLiter(s) Oral every 4 hours PRN  benzonatate 100 milliGRAM(s) Oral three times a day PRN  ibuprofen  Tablet. 200 milliGRAM(s) Oral every 8 hours PRN  ondansetron Injectable 4 milliGRAM(s) IV Push four times a day PRN    VITALS:   T(F): 98  HR: 90  BP: 180/80  RR: 16  SpO2: --    LABS:                        11.7   5.88  )-----------( 265      ( 07 Apr 2019 10:44 )             37.8     04-07    144  |  109  |  12  ----------------------------<  93  4.2   |  23  |  0.9    Ca    8.4<L>      07 Apr 2019 10:44                    RADIOLOGY:    PHYSICAL EXAM:  GEN: No acute distress  LUNGS: Clear to auscultation bilaterally   HEART: Regular  ABD: Soft, non-tender, non-distended.  EXT: NC/NC/NE/2+PP/BIRCH/Skin Intact.   NEURO: AAOX3    Intravenous access:   NG tube:   Turner Catheter:

## 2019-04-10 ENCOUNTER — TRANSCRIPTION ENCOUNTER (OUTPATIENT)
Age: 84
End: 2019-04-10

## 2019-04-10 VITALS — HEIGHT: 56 IN | WEIGHT: 149.91 LBS

## 2019-04-10 RX ORDER — BUDESONIDE AND FORMOTEROL FUMARATE DIHYDRATE 160; 4.5 UG/1; UG/1
2 AEROSOL RESPIRATORY (INHALATION)
Qty: 0 | Refills: 0 | DISCHARGE
Start: 2019-04-10

## 2019-04-10 RX ORDER — PANTOPRAZOLE SODIUM 20 MG/1
1 TABLET, DELAYED RELEASE ORAL
Qty: 0 | Refills: 0 | DISCHARGE
Start: 2019-04-10

## 2019-04-10 RX ORDER — TIOTROPIUM BROMIDE 18 UG/1
1 CAPSULE ORAL; RESPIRATORY (INHALATION)
Qty: 0 | Refills: 0 | DISCHARGE
Start: 2019-04-10

## 2019-04-10 RX ORDER — IPRATROPIUM/ALBUTEROL SULFATE 18-103MCG
3 AEROSOL WITH ADAPTER (GRAM) INHALATION
Qty: 0 | Refills: 0 | DISCHARGE
Start: 2019-04-10

## 2019-04-10 RX ADMIN — PANTOPRAZOLE SODIUM 40 MILLIGRAM(S): 20 TABLET, DELAYED RELEASE ORAL at 05:12

## 2019-04-10 RX ADMIN — HEPARIN SODIUM 5000 UNIT(S): 5000 INJECTION INTRAVENOUS; SUBCUTANEOUS at 05:12

## 2019-04-10 NOTE — DISCHARGE NOTE PROVIDER - CARE PROVIDER_API CALL
jeevan howard  Phone: (   )    -  Fax: (   )    -  Follow Up Time:     Seamus Morales (DO)  Critical Care Medicine; Internal Medicine; Pulmonary Disease  [address] [place] [address]  Phone: [phone]  Fax: [phone]  Follow Up Time:

## 2019-04-10 NOTE — DISCHARGE NOTE PROVIDER - PROVIDER TOKENS
FREE:[LAST:[lee],FIRST:[jeevan],PHONE:[(   )    -],FAX:[(   )    -]],PROVIDER:[TOKEN:[[id number]]]

## 2019-04-10 NOTE — PROGRESS NOTE ADULT - SUBJECTIVE AND OBJECTIVE BOX
SUBJECTIVE:    Patient is a 89y old Female who presents with a chief complaint of weakness/ (09 Apr 2019 17:07)    Currently admitted to medicine with the primary diagnosis of Weakness     Today is hospital day 5d. This morning she is resting comfortably in bed and reports no new issues or overnight events.   feeling well   PAST MEDICAL & SURGICAL HISTORY  Osteoarthritis: as per hx  Diverticulosis: as per hx  Hiatal hernia: as per hx  PUD (peptic ulcer disease): protonix  Chronic obstructive pulmonary disease: albuterol neb  o2 nc  S/P laparoscopic-assisted sigmoidectomy: as per hx    SOCIAL HISTORY:  Negative for smoking/alcohol/drug use.     ALLERGIES:  No Known Allergies    MEDICATIONS:  STANDING MEDICATIONS  ALBUTerol/ipratropium for Nebulization 3 milliLiter(s) Nebulizer every 6 hours  buDESOnide 160 MICROgram(s)/formoterol 4.5 MICROgram(s) Inhaler 2 Puff(s) Inhalation two times a day  heparin  Injectable 5000 Unit(s) SubCutaneous every 12 hours  pantoprazole    Tablet 40 milliGRAM(s) Oral before breakfast  tiotropium 18 MICROgram(s) Capsule 1 Capsule(s) Inhalation daily    PRN MEDICATIONS  acetaminophen   Tablet .. 650 milliGRAM(s) Oral every 6 hours PRN  aluminum hydroxide/magnesium hydroxide/simethicone Suspension 30 milliLiter(s) Oral every 4 hours PRN  benzonatate 100 milliGRAM(s) Oral three times a day PRN  guaiFENesin    Syrup 200 milliGRAM(s) Oral every 6 hours PRN  ibuprofen  Tablet. 200 milliGRAM(s) Oral every 8 hours PRN  ondansetron Injectable 4 milliGRAM(s) IV Push four times a day PRN    VITALS:   T(F): 96  HR: 85  BP: 171/81  RR: 16  SpO2: --    LABS:                        RADIOLOGY:  chest x ray --- no infiltrate   PHYSICAL EXAM:  GEN: No acute distress  LUNGS: Clear to auscultation bilaterally   HEART: Regular  ABD: Soft, non-tender, non-distended.  EXT: NC/NC/NE/2+PP/BIRCH/Skin Intact.   NEURO: AAOX3    Intravenous access:   NG tube:   Turner Catheter:

## 2019-04-10 NOTE — PROGRESS NOTE ADULT - PROVIDER SPECIALTY LIST ADULT
Internal Medicine
Pulmonology
Internal Medicine

## 2019-04-10 NOTE — DISCHARGE NOTE PROVIDER - NSDCCPCAREPLAN_GEN_ALL_CORE_FT
PRINCIPAL DISCHARGE DIAGNOSIS  Diagnosis: Weakness  Assessment and Plan of Treatment: discharge to SNF; c/w current meds.  Outpatient FU with PMD and Pulmonology on discharge      SECONDARY DISCHARGE DIAGNOSES  Diagnosis: Diarrhea  Assessment and Plan of Treatment:     Diagnosis: Nausea & vomiting  Assessment and Plan of Treatment:

## 2019-04-10 NOTE — DISCHARGE NOTE NURSING/CASE MANAGEMENT/SOCIAL WORK - NSDCPEWEB_GEN_ALL_CORE
[facility] for Tobacco Control website --- [email or web address] website --- [email or web address]

## 2019-04-10 NOTE — DISCHARGE NOTE PROVIDER - HOSPITAL COURSE
to be completed by attending · Chief Complaint: The patient is a 89y Female complaining of weakness.    · HPI Objective Statement: Patient is a 89 yo F w/ hx of COPD, sigmoidectomy s/p perforated diverticulitis p/w vomiting and weakness. Patient was treated with Abx for cough and congestion last week, then developed diarrhea; today, patient has been vomiting multiple times, has not been passing gas. Patient denies abdominal pain, fevers; last bowel movement was yesterday. Patient also endorses dry cough but denies SOB, chest pain. Denies dysuria or urinary frequency. Family bedside states patient appears weak and has a headache last week. Today patient denies headaches, denies changes in vision, denies numbness/tingling and focal weakness.        Physical Exam:    Physical Exam: GENERAL:  89y/o Female NAD, resting comfortably. + generalized weakness    	HEAD:  Atraumatic, Normocephalic    	EYES: EOMI, PERRLA, conjunctiva and sclera clear    	NECK: Supple, No JVD, no cervical lymphadenopathy, non-tender    	CHEST/LUNG: Clear to auscultation bilaterally; No wheeze, rhonchi, or rales    	HEART: Regular rate and rhythm; S1&S2    	ABDOMEN: Soft, Nontender, Nondistended x 4 quadrants; Bowel sounds present    	EXTREMITIES:   Peripheral Pulses Present, No clubbing, no cyanosis, or no edema, no calf tenderness    	PSYCH: AAOx3, cooperative, appropriate    	NEUROLOGY: WNL    SKIN: WNL    Problem/Plan - 1:    ·  Problem: Weakness.  Plan: poor po intake  consider dietary    pt consult    consider  consult.     Problem/Plan - 2:    ·  Problem: Nausea & vomiting.  Plan: sbo was r/o as per  abdo ct    zofran prn    ivf    npo to be reeval in AM if not nauseous anymore.        Problem/Plan - 3:    ·  Problem: Diarrhea.  Plan: diarrhea as per family but pt denies    guaic     contact    f/u result.         Problem/Plan - 4:    ·  Problem: Osteoarthritis.  Plan: motrin prn.         Problem/Plan - 5:    ·  Problem: Chronic obstructive pulmonary disease.  Plan: albuterol prn    o2 via nc.         Problem/Plan - 6:    Problem: PUD (peptic ulcer disease). Plan: protonix.        all above problem improved and discharge to rehab

## 2019-04-10 NOTE — DISCHARGE NOTE NURSING/CASE MANAGEMENT/SOCIAL WORK - NSDCPEEMAIL_GEN_ALL_CORE
[facility] for Tobacco Control email [email or web address]

## 2019-04-10 NOTE — PROGRESS NOTE ADULT - ASSESSMENT
HEALTH ISSUES - PROBLEM Dx:  PUD (peptic ulcer disease): PUD (peptic ulcer disease)  Chronic obstructive pulmonary disease: Chronic obstructive pulmonary disease  Osteoarthritis: Osteoarthritis  Diarrhea: Diarrhea  Nausea & vomiting: Nausea &amp; vomiting  Weakness: Weakness  no pneumonia    plan---------------  d/c antibiotic  waiting to go to rehab    d/w family
GI consult done mild diverticulitis stable no antibiotic  no evidence of  c diff -no diarrhea    plan------------------------------  continue current home meds  d/w son yesterday    wants her to go to [facility]  patient ready to be discharge son wants [facility] only
HEALTH ISSUES - PROBLEM Dx:  ?RUL opacity may be fibrosis                                                  pulmonary consult noted and appreciated  PUD (peptic ulcer disease): PUD (peptic ulcer disease)  Chronic obstructive pulmonary disease: Chronic obstructive pulmonary disease  Osteoarthritis: Osteoarthritis  Diarrhea: Diarrhea  Nausea & vomiting: Nausea &amp; vomiting  Weakness: Weakness      plan------------------    repeat chest x ninoksa  may not need antibiotic    disposition --REHAB

## 2019-04-10 NOTE — CHART NOTE - NSCHARTNOTEFT_GEN_A_CORE
Called by CM, bed available at SNF and authorization received.  d/w attending, ok to discharge.  c/w current meds and discharge med rec completed with attending.  Outpatient FU with Pulmonology and PMD in 1 week.

## 2019-04-10 NOTE — DISCHARGE NOTE NURSING/CASE MANAGEMENT/SOCIAL WORK - NSDCDPATPORTLINK_GEN_ALL_CORE
You can access the [facility] Patient Portal, offered by [facility], by registering with the following website: [email or web address]

## 2019-04-11 ENCOUNTER — OUTPATIENT (OUTPATIENT)
Dept: OUTPATIENT SERVICES | Facility: HOSPITAL | Age: 84
LOS: 1 days | Discharge: HOME | End: 2019-04-11

## 2019-04-11 DIAGNOSIS — Z90.49 ACQUIRED ABSENCE OF OTHER SPECIFIED PARTS OF DIGESTIVE TRACT: Chronic | ICD-10-CM

## 2019-04-11 LAB
CULTURE RESULTS: SIGNIFICANT CHANGE UP
SPECIMEN SOURCE: SIGNIFICANT CHANGE UP

## 2019-04-12 DIAGNOSIS — R79.9 ABNORMAL FINDING OF BLOOD CHEMISTRY, UNSPECIFIED: ICD-10-CM

## 2019-04-15 DIAGNOSIS — R53.81 OTHER MALAISE: ICD-10-CM

## 2019-04-15 DIAGNOSIS — K27.9 PEPTIC ULCER, SITE UNSPECIFIED, UNSPECIFIED AS ACUTE OR CHRONIC, WITHOUT HEMORRHAGE OR PERFORATION: ICD-10-CM

## 2019-04-15 DIAGNOSIS — Z90.49 ACQUIRED ABSENCE OF OTHER SPECIFIED PARTS OF DIGESTIVE TRACT: ICD-10-CM

## 2019-04-15 DIAGNOSIS — K57.92 DIVERTICULITIS OF INTESTINE, PART UNSPECIFIED, WITHOUT PERFORATION OR ABSCESS WITHOUT BLEEDING: ICD-10-CM

## 2019-04-15 DIAGNOSIS — J84.10 PULMONARY FIBROSIS, UNSPECIFIED: ICD-10-CM

## 2019-04-15 DIAGNOSIS — M19.90 UNSPECIFIED OSTEOARTHRITIS, UNSPECIFIED SITE: ICD-10-CM

## 2019-04-17 ENCOUNTER — OUTPATIENT (OUTPATIENT)
Dept: OUTPATIENT SERVICES | Facility: HOSPITAL | Age: 84
LOS: 1 days | Discharge: HOME | End: 2019-04-17

## 2019-04-17 DIAGNOSIS — R19.7 DIARRHEA, UNSPECIFIED: ICD-10-CM

## 2019-04-17 DIAGNOSIS — Z90.49 ACQUIRED ABSENCE OF OTHER SPECIFIED PARTS OF DIGESTIVE TRACT: Chronic | ICD-10-CM

## 2019-04-25 ENCOUNTER — OUTPATIENT (OUTPATIENT)
Dept: OUTPATIENT SERVICES | Facility: HOSPITAL | Age: 84
LOS: 1 days | Discharge: HOME | End: 2019-04-25

## 2019-04-25 DIAGNOSIS — R79.9 ABNORMAL FINDING OF BLOOD CHEMISTRY, UNSPECIFIED: ICD-10-CM

## 2019-04-25 DIAGNOSIS — Z90.49 ACQUIRED ABSENCE OF OTHER SPECIFIED PARTS OF DIGESTIVE TRACT: Chronic | ICD-10-CM

## 2019-06-05 ENCOUNTER — APPOINTMENT (OUTPATIENT)
Dept: CARDIOLOGY | Facility: CLINIC | Age: 84
End: 2019-06-05
Payer: MEDICARE

## 2019-06-05 VITALS — BODY MASS INDEX: 30.64 KG/M2 | HEIGHT: 58 IN | WEIGHT: 146 LBS

## 2019-06-05 VITALS — SYSTOLIC BLOOD PRESSURE: 140 MMHG | RESPIRATION RATE: 18 BRPM | DIASTOLIC BLOOD PRESSURE: 80 MMHG | HEART RATE: 80 BPM

## 2019-06-05 PROCEDURE — 93000 ELECTROCARDIOGRAM COMPLETE: CPT

## 2019-06-05 PROCEDURE — 99214 OFFICE O/P EST MOD 30 MIN: CPT

## 2019-06-05 RX ORDER — AMLODIPINE BESYLATE 5 MG/1
5 TABLET ORAL
Refills: 0 | Status: DISCONTINUED | COMMUNITY
End: 2019-06-05

## 2019-06-05 NOTE — PHYSICAL EXAM
[Normal Appearance] : normal appearance [General Appearance - Well Developed] : well developed [Well Groomed] : well groomed [General Appearance - Well Nourished] : well nourished [General Appearance - In No Acute Distress] : no acute distress [No Deformities] : no deformities [Eyelids - No Xanthelasma] : the eyelids demonstrated no xanthelasmas [Normal Conjunctiva] : the conjunctiva exhibited no abnormalities [No Oral Cyanosis] : no oral cyanosis [Normal Oral Mucosa] : normal oral mucosa [No Oral Pallor] : no oral pallor [No Jugular Venous Yoo A Waves] : no jugular venous yoo A waves [Normal Jugular Venous V Waves Present] : normal jugular venous V waves present [Normal Jugular Venous A Waves Present] : normal jugular venous A waves present [Heart Rate And Rhythm] : heart rate and rhythm were normal [Systolic grade ___/6] : A grade [unfilled]/6 systolic murmur was heard. [Respiration, Rhythm And Depth] : normal respiratory rhythm and effort [Exaggerated Use Of Accessory Muscles For Inspiration] : no accessory muscle use [Auscultation Breath Sounds / Voice Sounds] : lungs were clear to auscultation bilaterally [Abdomen Soft] : soft [Abdomen Tenderness] : non-tender [Bowel Sounds] : normal bowel sounds [Abdomen Mass (___ Cm)] : no abdominal mass palpated [Abnormal Walk] : normal gait [Nail Clubbing] : no clubbing of the fingernails [Cyanosis, Localized] : no localized cyanosis [Petechial Hemorrhages (___cm)] : no petechial hemorrhages [Skin Color & Pigmentation] : normal skin color and pigmentation [] : no rash [No Venous Stasis] : no venous stasis [Skin Lesions] : no skin lesions [No Skin Ulcers] : no skin ulcer [No Xanthoma] : no  xanthoma was observed [Oriented To Time, Place, And Person] : oriented to person, place, and time

## 2019-07-22 NOTE — PATIENT PROFILE ADULT - HAS THE PATIENT EXPERIENCED ANY OF THE FOLLOWING WITHIN THE WEEK PRIOR TO ADMISSION?
Formulation and discussion of sedation / procedure plans, risks, benefits, side effects and alternatives with patient and/or responsible adult completed.     Electronically signed by Devora Plascencia MD on 7/22/2019 at 11:58 AM no

## 2019-10-15 ENCOUNTER — CHART COPY (OUTPATIENT)
Age: 84
End: 2019-10-15

## 2019-10-15 ENCOUNTER — APPOINTMENT (OUTPATIENT)
Dept: CARDIOLOGY | Facility: CLINIC | Age: 84
End: 2019-10-15

## 2019-10-15 ENCOUNTER — APPOINTMENT (OUTPATIENT)
Dept: CARDIOLOGY | Facility: CLINIC | Age: 84
End: 2019-10-15
Payer: MEDICARE

## 2019-10-15 VITALS — HEART RATE: 96 BPM | RESPIRATION RATE: 18 BRPM | DIASTOLIC BLOOD PRESSURE: 84 MMHG | SYSTOLIC BLOOD PRESSURE: 136 MMHG

## 2019-10-15 VITALS — BODY MASS INDEX: 31.07 KG/M2 | WEIGHT: 148 LBS | HEIGHT: 58 IN

## 2019-10-15 DIAGNOSIS — Z87.11 PERSONAL HISTORY OF PEPTIC ULCER DISEASE: ICD-10-CM

## 2019-10-15 PROCEDURE — 99214 OFFICE O/P EST MOD 30 MIN: CPT

## 2019-10-15 PROCEDURE — 93000 ELECTROCARDIOGRAM COMPLETE: CPT

## 2019-10-15 NOTE — PHYSICAL EXAM
[General Appearance - Well Developed] : well developed [General Appearance - Well Nourished] : well nourished [Normal Appearance] : normal appearance [Well Groomed] : well groomed [Normal Conjunctiva] : the conjunctiva exhibited no abnormalities [General Appearance - In No Acute Distress] : no acute distress [No Deformities] : no deformities [Eyelids - No Xanthelasma] : the eyelids demonstrated no xanthelasmas [No Oral Pallor] : no oral pallor [Normal Oral Mucosa] : normal oral mucosa [Normal Jugular Venous V Waves Present] : normal jugular venous V waves present [No Oral Cyanosis] : no oral cyanosis [Normal Jugular Venous A Waves Present] : normal jugular venous A waves present [Irregularly Irregular] : the rhythm was irregularly irregular [No Jugular Venous Yoo A Waves] : no jugular venous yoo A waves [Respiration, Rhythm And Depth] : normal respiratory rhythm and effort [Systolic grade ___/6] : A grade [unfilled]/6 systolic murmur was heard. [Auscultation Breath Sounds / Voice Sounds] : lungs were clear to auscultation bilaterally [Bowel Sounds] : normal bowel sounds [Exaggerated Use Of Accessory Muscles For Inspiration] : no accessory muscle use [Abdomen Soft] : soft [Abdomen Tenderness] : non-tender [Abdomen Mass (___ Cm)] : no abdominal mass palpated [Abnormal Walk] : normal gait [Petechial Hemorrhages (___cm)] : no petechial hemorrhages [Cyanosis, Localized] : no localized cyanosis [Nail Clubbing] : no clubbing of the fingernails [Skin Color & Pigmentation] : normal skin color and pigmentation [] : no ischemic changes [Skin Lesions] : no skin lesions [No Venous Stasis] : no venous stasis [No Xanthoma] : no  xanthoma was observed [No Skin Ulcers] : no skin ulcer [Oriented To Time, Place, And Person] : oriented to person, place, and time

## 2020-08-20 ENCOUNTER — APPOINTMENT (OUTPATIENT)
Dept: CARDIOLOGY | Facility: CLINIC | Age: 85
End: 2020-08-20
Payer: MEDICARE

## 2020-08-20 VITALS — WEIGHT: 152 LBS | HEIGHT: 58 IN | TEMPERATURE: 95.8 F | BODY MASS INDEX: 31.91 KG/M2

## 2020-08-20 VITALS — RESPIRATION RATE: 18 BRPM | DIASTOLIC BLOOD PRESSURE: 80 MMHG | SYSTOLIC BLOOD PRESSURE: 120 MMHG | HEART RATE: 100 BPM

## 2020-08-20 PROCEDURE — 93000 ELECTROCARDIOGRAM COMPLETE: CPT

## 2020-08-20 PROCEDURE — 99214 OFFICE O/P EST MOD 30 MIN: CPT

## 2020-08-20 NOTE — PHYSICAL EXAM
[Normal Appearance] : normal appearance [General Appearance - Well Developed] : well developed [No Deformities] : no deformities [Well Groomed] : well groomed [General Appearance - Well Nourished] : well nourished [General Appearance - In No Acute Distress] : no acute distress [Normal Conjunctiva] : the conjunctiva exhibited no abnormalities [Eyelids - No Xanthelasma] : the eyelids demonstrated no xanthelasmas [No Oral Pallor] : no oral pallor [Normal Oral Mucosa] : normal oral mucosa [No Oral Cyanosis] : no oral cyanosis [Normal Jugular Venous V Waves Present] : normal jugular venous V waves present [Normal Jugular Venous A Waves Present] : normal jugular venous A waves present [No Jugular Venous Yoo A Waves] : no jugular venous yoo A waves [Irregularly Irregular] : the rhythm was irregularly irregular [Systolic grade ___/6] : A grade [unfilled]/6 systolic murmur was heard. [Respiration, Rhythm And Depth] : normal respiratory rhythm and effort [Exaggerated Use Of Accessory Muscles For Inspiration] : no accessory muscle use [Auscultation Breath Sounds / Voice Sounds] : lungs were clear to auscultation bilaterally [Abdomen Soft] : soft [Bowel Sounds] : normal bowel sounds [Abdomen Tenderness] : non-tender [Abdomen Mass (___ Cm)] : no abdominal mass palpated [Abnormal Walk] : normal gait [Nail Clubbing] : no clubbing of the fingernails [Cyanosis, Localized] : no localized cyanosis [Petechial Hemorrhages (___cm)] : no petechial hemorrhages [Skin Color & Pigmentation] : normal skin color and pigmentation [] : no rash [No Venous Stasis] : no venous stasis [Skin Lesions] : no skin lesions [No Skin Ulcers] : no skin ulcer [No Xanthoma] : no  xanthoma was observed [Oriented To Time, Place, And Person] : oriented to person, place, and time

## 2021-01-20 ENCOUNTER — APPOINTMENT (OUTPATIENT)
Dept: CARDIOLOGY | Facility: CLINIC | Age: 86
End: 2021-01-20
Payer: COMMERCIAL

## 2021-01-20 PROCEDURE — 93306 TTE W/DOPPLER COMPLETE: CPT

## 2021-01-20 PROCEDURE — 99072 ADDL SUPL MATRL&STAF TM PHE: CPT

## 2021-05-12 ENCOUNTER — RESULT CHARGE (OUTPATIENT)
Age: 86
End: 2021-05-12

## 2021-05-12 ENCOUNTER — APPOINTMENT (OUTPATIENT)
Dept: CARDIOLOGY | Facility: CLINIC | Age: 86
End: 2021-05-12
Payer: COMMERCIAL

## 2021-05-12 VITALS — WEIGHT: 152 LBS | BODY MASS INDEX: 31.91 KG/M2 | HEIGHT: 58 IN | TEMPERATURE: 97.2 F

## 2021-05-12 VITALS — SYSTOLIC BLOOD PRESSURE: 120 MMHG | DIASTOLIC BLOOD PRESSURE: 80 MMHG | HEART RATE: 80 BPM | RESPIRATION RATE: 18 BRPM

## 2021-05-12 DIAGNOSIS — I10 ESSENTIAL (PRIMARY) HYPERTENSION: ICD-10-CM

## 2021-05-12 DIAGNOSIS — I34.0 NONRHEUMATIC MITRAL (VALVE) INSUFFICIENCY: ICD-10-CM

## 2021-05-12 DIAGNOSIS — R94.31 ABNORMAL ELECTROCARDIOGRAM [ECG] [EKG]: ICD-10-CM

## 2021-05-12 DIAGNOSIS — Z86.79 PERSONAL HISTORY OF OTHER DISEASES OF THE CIRCULATORY SYSTEM: ICD-10-CM

## 2021-05-12 DIAGNOSIS — I48.0 PAROXYSMAL ATRIAL FIBRILLATION: ICD-10-CM

## 2021-05-12 DIAGNOSIS — J44.9 CHRONIC OBSTRUCTIVE PULMONARY DISEASE, UNSPECIFIED: ICD-10-CM

## 2021-05-12 DIAGNOSIS — I49.1 ATRIAL PREMATURE DEPOLARIZATION: ICD-10-CM

## 2021-05-12 DIAGNOSIS — K21.9 GASTRO-ESOPHAGEAL REFLUX DISEASE W/OUT ESOPHAGITIS: ICD-10-CM

## 2021-05-12 PROCEDURE — 93000 ELECTROCARDIOGRAM COMPLETE: CPT

## 2021-05-12 PROCEDURE — 99214 OFFICE O/P EST MOD 30 MIN: CPT

## 2021-05-12 PROCEDURE — 99072 ADDL SUPL MATRL&STAF TM PHE: CPT

## 2021-05-12 NOTE — PHYSICAL EXAM
[General Appearance - Well Developed] : well developed [Normal Appearance] : normal appearance [Well Groomed] : well groomed [General Appearance - Well Nourished] : well nourished [No Deformities] : no deformities [General Appearance - In No Acute Distress] : no acute distress [Normal Conjunctiva] : the conjunctiva exhibited no abnormalities [Eyelids - No Xanthelasma] : the eyelids demonstrated no xanthelasmas [Normal Oral Mucosa] : normal oral mucosa [No Oral Pallor] : no oral pallor [No Oral Cyanosis] : no oral cyanosis [Normal Jugular Venous A Waves Present] : normal jugular venous A waves present [Normal Jugular Venous V Waves Present] : normal jugular venous V waves present [No Jugular Venous Yoo A Waves] : no jugular venous yoo A waves [Irregularly Irregular] : the rhythm was irregularly irregular [Systolic grade ___/6] : A grade [unfilled]/6 systolic murmur was heard. [Respiration, Rhythm And Depth] : normal respiratory rhythm and effort [Exaggerated Use Of Accessory Muscles For Inspiration] : no accessory muscle use [Auscultation Breath Sounds / Voice Sounds] : lungs were clear to auscultation bilaterally [Bowel Sounds] : normal bowel sounds [Abdomen Soft] : soft [Abdomen Tenderness] : non-tender [Abdomen Mass (___ Cm)] : no abdominal mass palpated [Abnormal Walk] : normal gait [Nail Clubbing] : no clubbing of the fingernails [Cyanosis, Localized] : no localized cyanosis [Petechial Hemorrhages (___cm)] : no petechial hemorrhages [Skin Color & Pigmentation] : normal skin color and pigmentation [] : no rash [No Venous Stasis] : no venous stasis [Skin Lesions] : no skin lesions [No Skin Ulcers] : no skin ulcer [No Xanthoma] : no  xanthoma was observed [Oriented To Time, Place, And Person] : oriented to person, place, and time

## 2021-10-06 PROBLEM — I10 ESSENTIAL HYPERTENSION: Status: ACTIVE | Noted: 2019-06-05

## 2021-11-10 ENCOUNTER — APPOINTMENT (OUTPATIENT)
Dept: CARDIOLOGY | Facility: CLINIC | Age: 86
End: 2021-11-10

## 2021-12-08 ENCOUNTER — APPOINTMENT (OUTPATIENT)
Dept: NEUROLOGY | Facility: CLINIC | Age: 86
End: 2021-12-08

## 2022-02-09 NOTE — PROGRESS NOTE ADULT - SUBJECTIVE AND OBJECTIVE BOX
Patient was seen and examined. Spoke with RN. Chart reviewed.  No events overnight.  Vital Signs Last 24 Hrs  T(F): 96.1 (08 Jul 2018 05:00), Max: 96.6 (07 Jul 2018 14:46)  HR: 50 (08 Jul 2018 05:00) (50 - 77)  BP: 149/67 (08 Jul 2018 05:00) (137/78 - 193/83)  SpO2: --  MEDICATIONS  (STANDING):  amoxicillin  875 milliGRAM(s)/clavulanate 1 Tablet(s) Oral two times a day  docusate sodium 100 milliGRAM(s) Oral three times a day  enoxaparin Injectable 40 milliGRAM(s) SubCutaneous every 24 hours  melatonin 3 milliGRAM(s) Oral at bedtime  pantoprazole    Tablet 40 milliGRAM(s) Oral before breakfast  predniSONE   Tablet 40 milliGRAM(s) Oral daily    MEDICATIONS  (PRN):  aluminum hydroxide/magnesium hydroxide/simethicone Suspension 30 milliLiter(s) Oral every 4 hours PRN Dyspepsia  ibuprofen  Tablet 400 milliGRAM(s) Oral every 6 hours PRN Mild pain  ondansetron Injectable 4 milliGRAM(s) IV Push every 6 hours PRN Nausea  senna 2 Tablet(s) Oral at bedtime PRN Constipation      General: comfortable, NAD  Neurology: A&Ox3, nonfocal  Head:  Normocephalic, atraumatic  ENT:  Mucosa moist, no ulcerations  Neck:  Supple, no JVD,   Skin: no breakdowns (as per RN)  Resp: CTA B/L  CV: RRR, S1S2,   GI: Soft, NT, bowel sounds  MS: No edema, + peripheral pulses, FROM all 4 extremity      A/P:  89 y.o female with sialoadenitis 2* sialolith - clinically improving.    ·	cont abx- ID f/u for duration  ·	can wean prednisone as per ENT  ·	soft diet/full liquid as tolerated  -ENT f/u appreciated    PT/rehab  OOB to chair    DC planning   DVT prophylaxis  Decubitus prevention- all measures as per RN protocol  Please call or text me with any questions or updates Male

## 2022-11-22 NOTE — PRE-OP CHECKLIST - BMI (KG/M2)
Patient presents to IC with c/o lump to right breast x 2 days. She had right breast bx two weeks ago,No drainage or fever noted.
27.2

## 2023-04-20 ENCOUNTER — EMERGENCY (EMERGENCY)
Facility: HOSPITAL | Age: 88
LOS: 0 days | Discharge: HOME | End: 2023-04-21
Attending: STUDENT IN AN ORGANIZED HEALTH CARE EDUCATION/TRAINING PROGRAM
Payer: MEDICARE

## 2023-04-20 VITALS
HEART RATE: 89 BPM | DIASTOLIC BLOOD PRESSURE: 89 MMHG | RESPIRATION RATE: 22 BRPM | OXYGEN SATURATION: 99 % | TEMPERATURE: 99 F | SYSTOLIC BLOOD PRESSURE: 208 MMHG

## 2023-04-20 DIAGNOSIS — Z87.19 PERSONAL HISTORY OF OTHER DISEASES OF THE DIGESTIVE SYSTEM: ICD-10-CM

## 2023-04-20 DIAGNOSIS — M19.90 UNSPECIFIED OSTEOARTHRITIS, UNSPECIFIED SITE: ICD-10-CM

## 2023-04-20 DIAGNOSIS — R51.9 HEADACHE, UNSPECIFIED: ICD-10-CM

## 2023-04-20 DIAGNOSIS — Z87.891 PERSONAL HISTORY OF NICOTINE DEPENDENCE: ICD-10-CM

## 2023-04-20 DIAGNOSIS — Z87.11 PERSONAL HISTORY OF PEPTIC ULCER DISEASE: ICD-10-CM

## 2023-04-20 DIAGNOSIS — Z90.49 ACQUIRED ABSENCE OF OTHER SPECIFIED PARTS OF DIGESTIVE TRACT: Chronic | ICD-10-CM

## 2023-04-20 DIAGNOSIS — Z90.49 ACQUIRED ABSENCE OF OTHER SPECIFIED PARTS OF DIGESTIVE TRACT: ICD-10-CM

## 2023-04-20 DIAGNOSIS — J44.9 CHRONIC OBSTRUCTIVE PULMONARY DISEASE, UNSPECIFIED: ICD-10-CM

## 2023-04-20 LAB
ALBUMIN SERPL ELPH-MCNC: 4.1 G/DL — SIGNIFICANT CHANGE UP (ref 3.5–5.2)
ALP SERPL-CCNC: 73 U/L — SIGNIFICANT CHANGE UP (ref 30–115)
ALT FLD-CCNC: 6 U/L — SIGNIFICANT CHANGE UP (ref 0–41)
ANION GAP SERPL CALC-SCNC: 13 MMOL/L — SIGNIFICANT CHANGE UP (ref 7–14)
AST SERPL-CCNC: 16 U/L — SIGNIFICANT CHANGE UP (ref 0–41)
BASOPHILS # BLD AUTO: 0.07 K/UL — SIGNIFICANT CHANGE UP (ref 0–0.2)
BASOPHILS NFR BLD AUTO: 0.9 % — SIGNIFICANT CHANGE UP (ref 0–1)
BILIRUB SERPL-MCNC: 0.6 MG/DL — SIGNIFICANT CHANGE UP (ref 0.2–1.2)
BUN SERPL-MCNC: 23 MG/DL — HIGH (ref 10–20)
CALCIUM SERPL-MCNC: 9.2 MG/DL — SIGNIFICANT CHANGE UP (ref 8.4–10.5)
CHLORIDE SERPL-SCNC: 102 MMOL/L — SIGNIFICANT CHANGE UP (ref 98–110)
CO2 SERPL-SCNC: 25 MMOL/L — SIGNIFICANT CHANGE UP (ref 17–32)
CREAT SERPL-MCNC: 1.2 MG/DL — SIGNIFICANT CHANGE UP (ref 0.7–1.5)
EGFR: 42 ML/MIN/1.73M2 — LOW
EOSINOPHIL # BLD AUTO: 0.26 K/UL — SIGNIFICANT CHANGE UP (ref 0–0.7)
EOSINOPHIL NFR BLD AUTO: 3.2 % — SIGNIFICANT CHANGE UP (ref 0–8)
GLUCOSE SERPL-MCNC: 91 MG/DL — SIGNIFICANT CHANGE UP (ref 70–99)
HCT VFR BLD CALC: 32.9 % — LOW (ref 37–47)
HGB BLD-MCNC: 10.4 G/DL — LOW (ref 12–16)
IMM GRANULOCYTES NFR BLD AUTO: 0.5 % — HIGH (ref 0.1–0.3)
LYMPHOCYTES # BLD AUTO: 1.8 K/UL — SIGNIFICANT CHANGE UP (ref 1.2–3.4)
LYMPHOCYTES # BLD AUTO: 22.2 % — SIGNIFICANT CHANGE UP (ref 20.5–51.1)
MCHC RBC-ENTMCNC: 24.8 PG — LOW (ref 27–31)
MCHC RBC-ENTMCNC: 31.6 G/DL — LOW (ref 32–37)
MCV RBC AUTO: 78.3 FL — LOW (ref 81–99)
MONOCYTES # BLD AUTO: 0.62 K/UL — HIGH (ref 0.1–0.6)
MONOCYTES NFR BLD AUTO: 7.7 % — SIGNIFICANT CHANGE UP (ref 1.7–9.3)
NEUTROPHILS # BLD AUTO: 5.31 K/UL — SIGNIFICANT CHANGE UP (ref 1.4–6.5)
NEUTROPHILS NFR BLD AUTO: 65.5 % — SIGNIFICANT CHANGE UP (ref 42.2–75.2)
NRBC # BLD: 0 /100 WBCS — SIGNIFICANT CHANGE UP (ref 0–0)
PLATELET # BLD AUTO: 281 K/UL — SIGNIFICANT CHANGE UP (ref 130–400)
PMV BLD: 8.8 FL — SIGNIFICANT CHANGE UP (ref 7.4–10.4)
POTASSIUM SERPL-MCNC: 5 MMOL/L — SIGNIFICANT CHANGE UP (ref 3.5–5)
POTASSIUM SERPL-SCNC: 5 MMOL/L — SIGNIFICANT CHANGE UP (ref 3.5–5)
PROT SERPL-MCNC: 7 G/DL — SIGNIFICANT CHANGE UP (ref 6–8)
RBC # BLD: 4.2 M/UL — SIGNIFICANT CHANGE UP (ref 4.2–5.4)
RBC # FLD: 19.7 % — HIGH (ref 11.5–14.5)
SODIUM SERPL-SCNC: 140 MMOL/L — SIGNIFICANT CHANGE UP (ref 135–146)
WBC # BLD: 8.1 K/UL — SIGNIFICANT CHANGE UP (ref 4.8–10.8)
WBC # FLD AUTO: 8.1 K/UL — SIGNIFICANT CHANGE UP (ref 4.8–10.8)

## 2023-04-20 PROCEDURE — 80053 COMPREHEN METABOLIC PANEL: CPT

## 2023-04-20 PROCEDURE — 99284 EMERGENCY DEPT VISIT MOD MDM: CPT

## 2023-04-20 PROCEDURE — 85025 COMPLETE CBC W/AUTO DIFF WBC: CPT

## 2023-04-20 PROCEDURE — 36415 COLL VENOUS BLD VENIPUNCTURE: CPT

## 2023-04-20 PROCEDURE — 99284 EMERGENCY DEPT VISIT MOD MDM: CPT | Mod: 25

## 2023-04-20 PROCEDURE — 70450 CT HEAD/BRAIN W/O DYE: CPT | Mod: 26,MA

## 2023-04-20 PROCEDURE — 70450 CT HEAD/BRAIN W/O DYE: CPT | Mod: MA

## 2023-04-20 RX ORDER — ACETAMINOPHEN 500 MG
975 TABLET ORAL ONCE
Refills: 0 | Status: COMPLETED | OUTPATIENT
Start: 2023-04-20 | End: 2023-04-20

## 2023-04-20 RX ADMIN — Medication 975 MILLIGRAM(S): at 22:43

## 2023-04-20 NOTE — ED PROVIDER NOTE - PATIENT PORTAL LINK FT
You can access the FollowMyHealth Patient Portal offered by [facility] by registering at the following website: [email or web address]. By joining [employer]’s FollowMyHealth portal, you will also be able to view your health information using other applications (apps) compatible with our system.

## 2023-04-20 NOTE — ED ADULT NURSE NOTE - NSIMPLEMENTINTERV_GEN_ALL_ED
Implemented All Fall with Harm Risk Interventions:  [place] to call system. Call bell, personal items and telephone within reach. Instruct patient to call for assistance. Room bathroom lighting operational. Non-slip footwear when patient is off stretcher. Physically safe environment: no spills, clutter or unnecessary equipment. Stretcher in lowest position, wheels locked, appropriate side rails in place. Provide visual cue, wrist band, yellow gown, etc. Monitor gait and stability. Monitor for mental status changes and reorient to person, place, and time. Review medications for side effects contributing to fall risk. Reinforce activity limits and safety measures with patient and family. Provide visual clues: red socks.

## 2023-04-20 NOTE — ED PROVIDER NOTE - CLINICAL SUMMARY MEDICAL DECISION MAKING FREE TEXT BOX
Throughout ED observation period, pt remained clinically and hemodynamically stable.  no acute findings on imaging  no neuro complaints and serial exams nml  pt well appearing, no infectious complaints  no rash, but possible early zoster  no hearing complaints/vertigo/tinnitus Throughout ED observation period, pt remained clinically and hemodynamically stable.  no acute findings on imaging  no neuro complaints and serial exams nml  pt well appearing, no infectious complaints  no rash, but possible early zoster  no hearing complaints/vertigo/tinnitus  return precautions discussed w/ pt and son

## 2023-04-20 NOTE — ED PROVIDER NOTE - OBJECTIVE STATEMENT
patient c/o pain behind left ear since yesterday, constant, no radiating, no neck pain, no hearing loss,  no trauma

## 2023-04-20 NOTE — ED PROVIDER NOTE - ATTENDING APP SHARED VISIT CONTRIBUTION OF CARE
89 yo f hx copd  pt presents for L sided head pain. pain behind ear. no trauma, eye complaint, numbness, weakness, hearing change, recent dental procedure, tinnitus, vertigo, rash.    vss  gen- NAD, aaox3  vss  gen- NAD, aaox3  HENT- oropharynx clear, no drooling/stridor, tolerating secretions, normal voice, normal base of tongue, no facial swelling  Ears-   L ear- external exam normal- no erythema, ecchymosis, fluctuance, rash, vesicles, or laceration to external ear; ear canal w/o erythema, lesions or debris; TM w/o erythema, bulging or vesicles, cone of light normal; no tenderness or erythema to mastoid  card-rrr  lungs-ctab, no wheezing or rhonchi  abd-sntnd, no guarding or rebound  neuro- full str/sensation, cn ii-xii grossly intact, normal coordination and gait 89 yo f hx copd  pt presents for L sided head pain. pain behind ear. no trauma, eye complaint, numbness, weakness, hearing change, recent dental procedure, tinnitus, vertigo, rash.     vss  gen- NAD, aaox3  HENT- oropharynx clear, no drooling/stridor, tolerating secretions, normal voice, normal base of tongue, no facial swelling  Ears-   L ear- external exam normal- no erythema, ecchymosis, fluctuance, rash, vesicles, or laceration to external ear; ear canal w/o erythema, lesions or debris; TM w/o erythema, bulging or vesicles, cone of light normal; no tenderness or erythema to mastoid  card-rrr  lungs-ctab, no wheezing or rhonchi  abd-sntnd, no guarding or rebound  neuro- full str/sensation, cn ii-xii grossly intact, normal coordination and gait

## 2023-04-20 NOTE — ED ADULT TRIAGE NOTE - CHIEF COMPLAINT QUOTE
bilateral head pain that started last night.   BIBA called by her son.  Pt is alert to her name and oriented to place but not to time.  Speech is clear.

## 2023-04-20 NOTE — ED PROVIDER NOTE - NSFOLLOWUPINSTRUCTIONS_ED_ALL_ED_FT
Follow up with your doctor in 1 week for reassessment.    Headache    A headache is pain or discomfort felt around the head or neck area. The specific cause of a headache may not be found as there are many types including tension headaches, migraine headaches, and cluster headaches. Watch your condition for any changes. Things you can do to manage your pain include taking over the counter and prescription medications as instructed by your health care provider, lying down in a dark quiet room, limiting stress, getting regular sleep, and refraining from alcohol and tobacco products.    SEEK IMMEDIATE MEDICAL CARE IF YOU HAVE ANY OF THE FOLLOWING SYMPTOMS: fever, vomiting, stiff neck, loss of vision, problems with speech, muscle weakness, loss of balance, trouble walking, passing out, or confusion.

## 2023-05-03 NOTE — ED ADULT NURSE NOTE - CCCP TRG CHIEF CMPLNT
Patients GI provider:  Loren Zavaleta  Number to return call: [phone]    Reason for call: Pt pharmacy calling stating Nexium Is not covered          Scheduled procedure/appointment date if applicable: weakness

## 2023-07-07 ENCOUNTER — EMERGENCY (EMERGENCY)
Facility: HOSPITAL | Age: 88
LOS: 0 days | Discharge: HOME | End: 2023-07-07
Attending: EMERGENCY MEDICINE
Payer: MEDICARE

## 2023-07-07 VITALS
OXYGEN SATURATION: 98 % | WEIGHT: 139.99 LBS | SYSTOLIC BLOOD PRESSURE: 162 MMHG | RESPIRATION RATE: 18 BRPM | HEART RATE: 107 BPM | TEMPERATURE: 98 F | DIASTOLIC BLOOD PRESSURE: 104 MMHG

## 2023-07-07 VITALS
DIASTOLIC BLOOD PRESSURE: 83 MMHG | SYSTOLIC BLOOD PRESSURE: 171 MMHG | OXYGEN SATURATION: 99 % | HEART RATE: 97 BPM | RESPIRATION RATE: 18 BRPM

## 2023-07-07 DIAGNOSIS — J44.9 CHRONIC OBSTRUCTIVE PULMONARY DISEASE, UNSPECIFIED: ICD-10-CM
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
## 2023-07-07 DIAGNOSIS — M25.562 PAIN IN LEFT KNEE: ICD-10-CM
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
## 2023-07-07 DIAGNOSIS — Z90.49 ACQUIRED ABSENCE OF OTHER SPECIFIED PARTS OF DIGESTIVE TRACT: Chronic | ICD-10-CM

## 2023-07-07 DIAGNOSIS — R00.0 TACHYCARDIA, UNSPECIFIED: ICD-10-CM

## 2023-07-07 DIAGNOSIS — Z90.49 ACQUIRED ABSENCE OF OTHER SPECIFIED PARTS OF DIGESTIVE TRACT: ICD-10-CM

## 2023-07-07 DIAGNOSIS — M25.561 PAIN IN RIGHT KNEE: ICD-10-CM

## 2023-07-07 DIAGNOSIS — Z87.19 PERSONAL HISTORY OF OTHER DISEASES OF THE DIGESTIVE SYSTEM: ICD-10-CM

## 2023-07-07 DIAGNOSIS — Z87.39 PERSONAL HISTORY OF OTHER DISEASES OF THE MUSCULOSKELETAL SYSTEM AND CONNECTIVE TISSUE: ICD-10-CM

## 2023-07-07 DIAGNOSIS — M54.50 LOW BACK PAIN, UNSPECIFIED: ICD-10-CM

## 2023-07-07 DIAGNOSIS — R94.31 ABNORMAL ELECTROCARDIOGRAM [ECG] [EKG]: ICD-10-CM

## 2023-07-07 DIAGNOSIS — I71.20 THORACIC AORTIC ANEURYSM, WITHOUT RUPTURE, UNSPECIFIED: ICD-10-CM

## 2023-07-07 DIAGNOSIS — Z87.11 PERSONAL HISTORY OF PEPTIC ULCER DISEASE: ICD-10-CM

## 2023-07-07 LAB
ALBUMIN SERPL ELPH-MCNC: 4.1 G/DL — SIGNIFICANT CHANGE UP (ref 3.5–5.2)
ALP SERPL-CCNC: 67 U/L — SIGNIFICANT CHANGE UP (ref 30–115)
ALT FLD-CCNC: 12 U/L — SIGNIFICANT CHANGE UP (ref 0–41)
ANION GAP SERPL CALC-SCNC: 11 MMOL/L — SIGNIFICANT CHANGE UP (ref 7–14)
APTT BLD: 28.1 SEC — SIGNIFICANT CHANGE UP (ref 27–39.2)
AST SERPL-CCNC: 38 U/L — SIGNIFICANT CHANGE UP (ref 0–41)
BASOPHILS # BLD AUTO: 0.05 K/UL — SIGNIFICANT CHANGE UP (ref 0–0.2)
BASOPHILS NFR BLD AUTO: 0.5 % — SIGNIFICANT CHANGE UP (ref 0–1)
BILIRUB SERPL-MCNC: 1.1 MG/DL — SIGNIFICANT CHANGE UP (ref 0.2–1.2)
BUN SERPL-MCNC: 22 MG/DL — HIGH (ref 10–20)
CALCIUM SERPL-MCNC: 9 MG/DL — SIGNIFICANT CHANGE UP (ref 8.4–10.5)
CHLORIDE SERPL-SCNC: 102 MMOL/L — SIGNIFICANT CHANGE UP (ref 98–110)
CK SERPL-CCNC: 219 U/L — SIGNIFICANT CHANGE UP (ref 0–225)
CO2 SERPL-SCNC: 23 MMOL/L — SIGNIFICANT CHANGE UP (ref 17–32)
CREAT SERPL-MCNC: 1.1 MG/DL — SIGNIFICANT CHANGE UP (ref 0.7–1.5)
EGFR: 46 ML/MIN/1.73M2 — LOW
EOSINOPHIL # BLD AUTO: 0.1 K/UL — SIGNIFICANT CHANGE UP (ref 0–0.7)
EOSINOPHIL NFR BLD AUTO: 1 % — SIGNIFICANT CHANGE UP (ref 0–8)
ETHANOL SERPL-MCNC: <10 MG/DL — SIGNIFICANT CHANGE UP
GLUCOSE SERPL-MCNC: 119 MG/DL — HIGH (ref 70–99)
HCT VFR BLD CALC: 36.3 % — LOW (ref 37–47)
HGB BLD-MCNC: 11.7 G/DL — LOW (ref 12–16)
IMM GRANULOCYTES NFR BLD AUTO: 0.7 % — HIGH (ref 0.1–0.3)
INR BLD: 1.06 RATIO — SIGNIFICANT CHANGE UP (ref 0.65–1.3)
LACTATE SERPL-SCNC: 1.1 MMOL/L — SIGNIFICANT CHANGE UP (ref 0.7–2)
LIDOCAIN IGE QN: 25 U/L — SIGNIFICANT CHANGE UP (ref 7–60)
LYMPHOCYTES # BLD AUTO: 0.76 K/UL — LOW (ref 1.2–3.4)
LYMPHOCYTES # BLD AUTO: 7.5 % — LOW (ref 20.5–51.1)
MCHC RBC-ENTMCNC: 26.5 PG — LOW (ref 27–31)
MCHC RBC-ENTMCNC: 32.2 G/DL — SIGNIFICANT CHANGE UP (ref 32–37)
MCV RBC AUTO: 82.3 FL — SIGNIFICANT CHANGE UP (ref 81–99)
MONOCYTES # BLD AUTO: 0.7 K/UL — HIGH (ref 0.1–0.6)
MONOCYTES NFR BLD AUTO: 6.9 % — SIGNIFICANT CHANGE UP (ref 1.7–9.3)
NEUTROPHILS # BLD AUTO: 8.47 K/UL — HIGH (ref 1.4–6.5)
NEUTROPHILS NFR BLD AUTO: 83.4 % — HIGH (ref 42.2–75.2)
NRBC # BLD: 0 /100 WBCS — SIGNIFICANT CHANGE UP (ref 0–0)
PLATELET # BLD AUTO: 276 K/UL — SIGNIFICANT CHANGE UP (ref 130–400)
PMV BLD: 8.9 FL — SIGNIFICANT CHANGE UP (ref 7.4–10.4)
POTASSIUM SERPL-MCNC: 5.2 MMOL/L — HIGH (ref 3.5–5)
POTASSIUM SERPL-SCNC: 5.2 MMOL/L — HIGH (ref 3.5–5)
PROT SERPL-MCNC: 7.4 G/DL — SIGNIFICANT CHANGE UP (ref 6–8)
PROTHROM AB SERPL-ACNC: 12.1 SEC — SIGNIFICANT CHANGE UP (ref 9.95–12.87)
RBC # BLD: 4.41 M/UL — SIGNIFICANT CHANGE UP (ref 4.2–5.4)
RBC # FLD: 18.2 % — HIGH (ref 11.5–14.5)
SODIUM SERPL-SCNC: 136 MMOL/L — SIGNIFICANT CHANGE UP (ref 135–146)
TROPONIN T SERPL-MCNC: <0.01 NG/ML — SIGNIFICANT CHANGE UP
WBC # BLD: 10.15 K/UL — SIGNIFICANT CHANGE UP (ref 4.8–10.8)
WBC # FLD AUTO: 10.15 K/UL — SIGNIFICANT CHANGE UP (ref 4.8–10.8)

## 2023-07-07 PROCEDURE — 99285 EMERGENCY DEPT VISIT HI MDM: CPT | Mod: 25

## 2023-07-07 PROCEDURE — 36415 COLL VENOUS BLD VENIPUNCTURE: CPT

## 2023-07-07 PROCEDURE — 85025 COMPLETE CBC W/AUTO DIFF WBC: CPT

## 2023-07-07 PROCEDURE — 80307 DRUG TEST PRSMV CHEM ANLYZR: CPT

## 2023-07-07 PROCEDURE — 70450 CT HEAD/BRAIN W/O DYE: CPT | Mod: 26,MA

## 2023-07-07 PROCEDURE — 74176 CT ABD & PELVIS W/O CONTRAST: CPT | Mod: MA

## 2023-07-07 PROCEDURE — 84484 ASSAY OF TROPONIN QUANT: CPT

## 2023-07-07 PROCEDURE — 71250 CT THORAX DX C-: CPT | Mod: MA

## 2023-07-07 PROCEDURE — 93005 ELECTROCARDIOGRAM TRACING: CPT

## 2023-07-07 PROCEDURE — 99285 EMERGENCY DEPT VISIT HI MDM: CPT

## 2023-07-07 PROCEDURE — 72125 CT NECK SPINE W/O DYE: CPT | Mod: 26,MA

## 2023-07-07 PROCEDURE — 83605 ASSAY OF LACTIC ACID: CPT

## 2023-07-07 PROCEDURE — 71250 CT THORAX DX C-: CPT | Mod: 26,MA

## 2023-07-07 PROCEDURE — 82550 ASSAY OF CK (CPK): CPT

## 2023-07-07 PROCEDURE — 74176 CT ABD & PELVIS W/O CONTRAST: CPT | Mod: 26,MA

## 2023-07-07 PROCEDURE — 93010 ELECTROCARDIOGRAM REPORT: CPT

## 2023-07-07 PROCEDURE — 83690 ASSAY OF LIPASE: CPT

## 2023-07-07 PROCEDURE — 85730 THROMBOPLASTIN TIME PARTIAL: CPT

## 2023-07-07 PROCEDURE — 72125 CT NECK SPINE W/O DYE: CPT | Mod: MA

## 2023-07-07 PROCEDURE — 80053 COMPREHEN METABOLIC PANEL: CPT

## 2023-07-07 PROCEDURE — 85610 PROTHROMBIN TIME: CPT

## 2023-07-07 PROCEDURE — 70450 CT HEAD/BRAIN W/O DYE: CPT | Mod: MA

## 2023-07-07 RX ORDER — SODIUM CHLORIDE 9 MG/ML
1000 INJECTION INTRAMUSCULAR; INTRAVENOUS; SUBCUTANEOUS ONCE
Refills: 0 | Status: COMPLETED | OUTPATIENT
Start: 2023-07-07 | End: 2023-07-07

## 2023-07-07 RX ADMIN — SODIUM CHLORIDE 1000 MILLILITER(S): 9 INJECTION INTRAMUSCULAR; INTRAVENOUS; SUBCUTANEOUS at 13:09

## 2023-07-07 NOTE — ED PROVIDER NOTE - PHYSICAL EXAMINATION
CONSTITUTIONAL: NAD, AOx3, cheerful  SKIN: Warm, dry  HEAD: NCAT  EYES: Clear conjunctiva, PERRL, EOMI  ENT: MMM  NECK: Supple, full ROM  CARD: RRR, S1, S2; no M/R/G  RESP: Speaking in full sentences; CTAB  ABD: S/NT, no R/G   EXT: Pulses palpable distally, no edema, no bony tenderness, FROM without pain  NEURO: AOx3, speaking in full clear sentences, no dysarthria. CN 2-12 grossly intact. No facial droop. No pronator drift. Strength and sensation intact and symmetric in all extremities. Finger to nose WNL

## 2023-07-07 NOTE — ED PROVIDER NOTE - PROGRESS NOTE DETAILS
JEANE-- on reeval pt denies any current complaints, CT reads pending JEANE-- discussed with family at bedside about CT results including AAA measuring 5.6cm and age indeterminate rib fractures. Family understand and agree with plan for dc with fu

## 2023-07-07 NOTE — ED PROVIDER NOTE - CLINICAL SUMMARY MEDICAL DECISION MAKING FREE TEXT BOX
fall - no acute displaced fractures noted - well appearing with good inspiratory effort - incidental aortic aneursym noted - dc home

## 2023-07-07 NOTE — ED ADULT TRIAGE NOTE - CHIEF COMPLAINT QUOTE
Patient bibems in NAD a+ox3 accompanied by home health aid reports fell trying to get into bed last night around 11pm and was found by aid this morning- reports hitting head. Denies LOC/AC use

## 2023-07-07 NOTE — ED PROVIDER NOTE - PATIENT PORTAL LINK FT
You can access the FollowMyHealth Patient Portal offered by [facility] by registering at the following website: [email or web address]. By joining [employer]’s FollowMyHealth portal, you will also be able to view your health information using other applications (apps) compatible with our system.

## 2023-07-07 NOTE — ED PROVIDER NOTE - OBJECTIVE STATEMENT
89yoF no PMHx presenting s/p fall last night. Family reports she fell at about 11pm last night and got trapped between the nightstand and the wall and couldn't get up until the aid came to visit her at 10am this morning. Family reports patient is behaving at baseline, denies any recent illness, fever, nausea, vomiting. Patient AOx3, cheerful, complaining of mild pain to her knees and lower back. No other obvious signs of trauma 89yoF no PMHx presenting s/p unwittnessed fall last night. Patient and family reports she fell at about 11pm last night and got trapped between the nightstand and the wall and couldn't get up until the aid came to visit her at 10am this morning. Patient reports she's not sure how she fell but denies any symptoms just prior to fall, denies syncope, chest pain SOB. Family reports patient is behaving at baseline, denies any anticoagulation, recent illness, fever, nausea, vomiting. Patient AOx3, cheerful, complaining of mild pain to her knees and lower back. No obvious signs of trauma

## 2023-07-07 NOTE — ED ADULT NURSE NOTE - NS ED NURSE DISCH DISPOSITION
Visit Type: In person  Referral Source: Patient  Reason for Visit: Routine Visit  Writer made supportive visit to pt as promised at yesterday's visit with pt and her daugther Lisseth. Pt was tired and preferred visit when she is more up to it. Writer spoke to RN for updates - will continue with routine follow up.   Discharged

## 2023-07-07 NOTE — ED PROVIDER NOTE - CARE PLAN
1 Principal Discharge DX:	Fall   Principal Discharge DX:	Fall  Secondary Diagnosis:	Thoracic aortic aneurysm

## 2023-07-07 NOTE — ED PROVIDER NOTE - ATTENDING CONTRIBUTION TO CARE
Patient is a 89-year-old female sent from nursing home for patient is a 89-year-old female who fell last night when tried to walk without her walker and ended up wedged between her nightstand in bed and was unable to get herself out.  Daughter found her this morning and called EMS for assistance.  She reports some minor knee pain.    Exam: Regular rate, lungs clear, no chest wall tenderness, nontender spine, stable pelvis, soft nontender abdomen, no signs of trauma, nonfocal neuro exam, full range of motion of knees without bony tenderness, 2+ DP and radial pulses  Plan: Labs, EKG, trauma pan scan

## 2023-07-07 NOTE — ED PROVIDER NOTE - NSFOLLOWUPINSTRUCTIONS_ED_ALL_ED_FT
Fall Prevention in the Home, Adult    Falls can cause injuries and can happen to people of all ages. There are many things you can do to make your home safe and to help prevent falls. Ask for help when making these changes.    What actions can I take to prevent falls?  General Instructions    Use good lighting in all rooms. Replace any light bulbs that burn out.  Turn on the lights in dark areas. Use night-lights.  Keep items that you use often in easy-to-reach places. Lower the shelves around your home if needed.  Set up your furniture so you have a clear path. Avoid moving your furniture around.  Do not have throw rugs or other things on the floor that can make you trip.  Avoid walking on wet floors.  If any of your floors are uneven, fix them.  Add color or contrast paint or tape to clearly jj and help you see:  Grab bars or handrails.  First and last steps of staircases.  Where the edge of each step is.  If you use a stepladder:  Make sure that it is fully opened. Do not climb a closed stepladder.  Make sure the sides of the stepladder are locked in place.  Ask someone to hold the stepladder while you use it.  Know where your pets are when moving through your home.  What can I do in the bathroom?        Keep the floor dry. Clean up any water on the floor right away.  Remove soap buildup in the tub or shower.  Use nonskid mats or decals on the floor of the tub or shower.  Attach bath mats securely with double-sided, nonslip rug tape.  If you need to sit down in the shower, use a plastic, nonslip stool.  Install grab bars by the toilet and in the tub and shower. Do not use towel bars as grab bars.  What can I do in the bedroom?    Make sure that you have a light by your bed that is easy to reach.  Do not use any sheets or blankets for your bed that hang to the floor.  Have a firm chair with side arms that you can use for support when you get dressed.  What can I do in the kitchen?    Clean up any spills right away.  If you need to reach something above you, use a step stool with a grab bar.  Keep electrical cords out of the way.  Do not use floor polish or wax that makes floors slippery.  What can I do with my stairs?    Do not leave any items on the stairs.  Make sure that you have a light switch at the top and the bottom of the stairs.  Make sure that there are handrails on both sides of the stairs. Fix handrails that are broken or loose.  Install nonslip stair treads on all your stairs.  Avoid having throw rugs at the top or bottom of the stairs.  Choose a carpet that does not hide the edge of the steps on the stairs.  Check carpeting to make sure that it is firmly attached to the stairs. Fix carpet that is loose or worn.  What can I do on the outside of my home?    Use bright outdoor lighting.  Fix the edges of walkways and driveways and fix any cracks.  Remove anything that might make you trip as you walk through a door, such as a raised step or threshold.  Trim any bushes or trees on paths to your home.  Check to see if handrails are loose or broken and that both sides of all steps have handrails.  Install guardrails along the edges of any raised decks and porches.  Clear paths of anything that can make you trip, such as tools or rocks.  Have leaves, snow, or ice cleared regularly.  Use sand or salt on paths during winter.  Clean up any spills in your garage right away. This includes grease or oil spills.  What other actions can I take?    Wear shoes that:  Have a low heel. Do not wear high heels.  Have rubber bottoms.  Feel good on your feet and fit well.  Are closed at the toe. Do not wear open-toe sandals.  Use tools that help you move around if needed. These include:  Canes.  Walkers.  Scooters.  Crutches.  Review your medicines with your doctor. Some medicines can make you feel dizzy. This can increase your chance of falling.  Ask your doctor what else you can do to help prevent falls.    Where to find more information  Centers for Disease Control and PreventionAKI: www.cdc.gov  [place]: www.husam.nih.gov  Contact a doctor if:  You are afraid of falling at home.  You feel weak, drowsy, or dizzy at home.  You fall at home.  Summary  There are many simple things that you can do to make your home safe and to help prevent falls.  Ways to make your home safe include removing things that can make you trip and installing grab bars in the bathroom.  Ask for help when making these changes in your home.  This information is not intended to replace advice given to you by your health care provider. Make sure you discuss any questions you have with your health care provider. Medical Clearance    A medical screening exam has been done. This exam is meant to determine whether you need emergency treatment. Your exam has not demonstrated the need for emergency treatment at this point. It is safe for you to go to your caregiver's office or clinic for further evaluation and/or treatment. You should make an appointment to see your caregiver as soon as he or she is available.

## 2023-07-07 NOTE — ED ADULT NURSE NOTE - NSFALLHARMRISKINTERV_ED_ALL_ED
Assistance OOB with selected safe patient handling equipment if applicable/Assistance with ambulation/Communicate risk of Fall with Harm to all staff, patient, and family/Encourage patient to sit up slowly, dangle for a short time, stand at bedside before walking/Monitor gait and stability/Orthostatic vital signs/Provide visual cue: red socks, yellow wristband, yellow gown, etc/Reinforce activity limits and safety measures with patient and family/Bed in lowest position, wheels locked, appropriate side rails in place/Call bell, personal items and telephone in reach/Instruct patient to call for assistance before getting out of bed/chair/stretcher/Non-slip footwear applied when patient is off stretcher/[place] to call system/Physically safe environment - no spills, clutter or unnecessary equipment/Purposeful Proactive Rounding/Room/bathroom lighting operational, light cord in reach

## 2023-07-12 ENCOUNTER — INPATIENT (INPATIENT)
Facility: HOSPITAL | Age: 88
LOS: 0 days | Discharge: NURSING, REHAB OR LONG-TERM CARE | DRG: 93 | End: 2023-07-13
Attending: HOSPITALIST | Admitting: HOSPITALIST
Payer: MEDICARE

## 2023-07-12 VITALS
WEIGHT: 139.99 LBS | SYSTOLIC BLOOD PRESSURE: 175 MMHG | RESPIRATION RATE: 20 BRPM | HEART RATE: 89 BPM | TEMPERATURE: 96 F | DIASTOLIC BLOOD PRESSURE: 91 MMHG | OXYGEN SATURATION: 96 %

## 2023-07-12 DIAGNOSIS — Z90.49 ACQUIRED ABSENCE OF OTHER SPECIFIED PARTS OF DIGESTIVE TRACT: Chronic | ICD-10-CM

## 2023-07-12 DIAGNOSIS — R26.2 DIFFICULTY IN WALKING, NOT ELSEWHERE CLASSIFIED: ICD-10-CM

## 2023-07-12 LAB
ALBUMIN SERPL ELPH-MCNC: 4.1 G/DL — SIGNIFICANT CHANGE UP (ref 3.5–5.2)
ALP SERPL-CCNC: 80 U/L — SIGNIFICANT CHANGE UP (ref 30–115)
ALT FLD-CCNC: 9 U/L — SIGNIFICANT CHANGE UP (ref 0–41)
ANION GAP SERPL CALC-SCNC: 11 MMOL/L — SIGNIFICANT CHANGE UP (ref 7–14)
AST SERPL-CCNC: 19 U/L — SIGNIFICANT CHANGE UP (ref 0–41)
BASOPHILS # BLD AUTO: 0.07 K/UL — SIGNIFICANT CHANGE UP (ref 0–0.2)
BASOPHILS NFR BLD AUTO: 1 % — SIGNIFICANT CHANGE UP (ref 0–1)
BILIRUB SERPL-MCNC: 0.7 MG/DL — SIGNIFICANT CHANGE UP (ref 0.2–1.2)
BUN SERPL-MCNC: 25 MG/DL — HIGH (ref 10–20)
CALCIUM SERPL-MCNC: 9 MG/DL — SIGNIFICANT CHANGE UP (ref 8.4–10.5)
CHLORIDE SERPL-SCNC: 105 MMOL/L — SIGNIFICANT CHANGE UP (ref 98–110)
CO2 SERPL-SCNC: 24 MMOL/L — SIGNIFICANT CHANGE UP (ref 17–32)
CREAT SERPL-MCNC: 1.1 MG/DL — SIGNIFICANT CHANGE UP (ref 0.7–1.5)
EGFR: 46 ML/MIN/1.73M2 — LOW
EOSINOPHIL # BLD AUTO: 0.27 K/UL — SIGNIFICANT CHANGE UP (ref 0–0.7)
EOSINOPHIL NFR BLD AUTO: 3.8 % — SIGNIFICANT CHANGE UP (ref 0–8)
GLUCOSE SERPL-MCNC: 95 MG/DL — SIGNIFICANT CHANGE UP (ref 70–99)
HCT VFR BLD CALC: 36.7 % — LOW (ref 37–47)
HGB BLD-MCNC: 11.4 G/DL — LOW (ref 12–16)
IMM GRANULOCYTES NFR BLD AUTO: 0.7 % — HIGH (ref 0.1–0.3)
LYMPHOCYTES # BLD AUTO: 1.42 K/UL — SIGNIFICANT CHANGE UP (ref 1.2–3.4)
LYMPHOCYTES # BLD AUTO: 20 % — LOW (ref 20.5–51.1)
MCHC RBC-ENTMCNC: 25.6 PG — LOW (ref 27–31)
MCHC RBC-ENTMCNC: 31.1 G/DL — LOW (ref 32–37)
MCV RBC AUTO: 82.3 FL — SIGNIFICANT CHANGE UP (ref 81–99)
MONOCYTES # BLD AUTO: 0.53 K/UL — SIGNIFICANT CHANGE UP (ref 0.1–0.6)
MONOCYTES NFR BLD AUTO: 7.5 % — SIGNIFICANT CHANGE UP (ref 1.7–9.3)
NEUTROPHILS # BLD AUTO: 4.77 K/UL — SIGNIFICANT CHANGE UP (ref 1.4–6.5)
NEUTROPHILS NFR BLD AUTO: 67 % — SIGNIFICANT CHANGE UP (ref 42.2–75.2)
NRBC # BLD: 0 /100 WBCS — SIGNIFICANT CHANGE UP (ref 0–0)
PLATELET # BLD AUTO: 275 K/UL — SIGNIFICANT CHANGE UP (ref 130–400)
PMV BLD: 9.1 FL — SIGNIFICANT CHANGE UP (ref 7.4–10.4)
POTASSIUM SERPL-MCNC: 5.3 MMOL/L — HIGH (ref 3.5–5)
POTASSIUM SERPL-SCNC: 5.3 MMOL/L — HIGH (ref 3.5–5)
PROT SERPL-MCNC: 7.2 G/DL — SIGNIFICANT CHANGE UP (ref 6–8)
RBC # BLD: 4.46 M/UL — SIGNIFICANT CHANGE UP (ref 4.2–5.4)
RBC # FLD: 18.3 % — HIGH (ref 11.5–14.5)
SODIUM SERPL-SCNC: 140 MMOL/L — SIGNIFICANT CHANGE UP (ref 135–146)
WBC # BLD: 7.11 K/UL — SIGNIFICANT CHANGE UP (ref 4.8–10.8)
WBC # FLD AUTO: 7.11 K/UL — SIGNIFICANT CHANGE UP (ref 4.8–10.8)

## 2023-07-12 PROCEDURE — 73562 X-RAY EXAM OF KNEE 3: CPT | Mod: 50

## 2023-07-12 PROCEDURE — 97162 PT EVAL MOD COMPLEX 30 MIN: CPT | Mod: GP

## 2023-07-12 PROCEDURE — 97165 OT EVAL LOW COMPLEX 30 MIN: CPT | Mod: GO

## 2023-07-12 PROCEDURE — 99285 EMERGENCY DEPT VISIT HI MDM: CPT

## 2023-07-12 PROCEDURE — 99223 1ST HOSP IP/OBS HIGH 75: CPT

## 2023-07-12 PROCEDURE — 73562 X-RAY EXAM OF KNEE 3: CPT | Mod: 26,50

## 2023-07-12 PROCEDURE — 72170 X-RAY EXAM OF PELVIS: CPT | Mod: 26

## 2023-07-12 PROCEDURE — 73700 CT LOWER EXTREMITY W/O DYE: CPT | Mod: 26,LT,MA

## 2023-07-12 PROCEDURE — 73552 X-RAY EXAM OF FEMUR 2/>: CPT | Mod: 26,LT

## 2023-07-12 RX ORDER — ENOXAPARIN SODIUM 100 MG/ML
40 INJECTION SUBCUTANEOUS EVERY 24 HOURS
Refills: 0 | Status: DISCONTINUED | OUTPATIENT
Start: 2023-07-12 | End: 2023-07-13

## 2023-07-12 RX ORDER — PANTOPRAZOLE SODIUM 20 MG/1
40 TABLET, DELAYED RELEASE ORAL
Refills: 0 | Status: DISCONTINUED | OUTPATIENT
Start: 2023-07-12 | End: 2023-07-13

## 2023-07-12 RX ORDER — DEXAMETHASONE 0.5 MG/5ML
4 ELIXIR ORAL ONCE
Refills: 0 | Status: COMPLETED | OUTPATIENT
Start: 2023-07-12 | End: 2023-07-12

## 2023-07-12 RX ORDER — LIDOCAINE 4 G/100G
2 CREAM TOPICAL DAILY
Refills: 0 | Status: DISCONTINUED | OUTPATIENT
Start: 2023-07-12 | End: 2023-07-13

## 2023-07-12 RX ORDER — TRAMADOL HYDROCHLORIDE 50 MG/1
25 TABLET ORAL THREE TIMES A DAY
Refills: 0 | Status: DISCONTINUED | OUTPATIENT
Start: 2023-07-12 | End: 2023-07-13

## 2023-07-12 RX ORDER — ACETAMINOPHEN 500 MG
975 TABLET ORAL ONCE
Refills: 0 | Status: COMPLETED | OUTPATIENT
Start: 2023-07-12 | End: 2023-07-12

## 2023-07-12 RX ORDER — ONDANSETRON 8 MG/1
4 TABLET, FILM COATED ORAL EVERY 8 HOURS
Refills: 0 | Status: DISCONTINUED | OUTPATIENT
Start: 2023-07-12 | End: 2023-07-12

## 2023-07-12 RX ORDER — SODIUM ZIRCONIUM CYCLOSILICATE 10 G/10G
10 POWDER, FOR SUSPENSION ORAL ONCE
Refills: 0 | Status: COMPLETED | OUTPATIENT
Start: 2023-07-12 | End: 2023-07-12

## 2023-07-12 RX ORDER — ACETAMINOPHEN 500 MG
650 TABLET ORAL EVERY 6 HOURS
Refills: 0 | Status: DISCONTINUED | OUTPATIENT
Start: 2023-07-12 | End: 2023-07-13

## 2023-07-12 RX ORDER — LANOLIN ALCOHOL/MO/W.PET/CERES
3 CREAM (GRAM) TOPICAL AT BEDTIME
Refills: 0 | Status: DISCONTINUED | OUTPATIENT
Start: 2023-07-12 | End: 2023-07-13

## 2023-07-12 RX ADMIN — ENOXAPARIN SODIUM 40 MILLIGRAM(S): 100 INJECTION SUBCUTANEOUS at 18:45

## 2023-07-12 RX ADMIN — LIDOCAINE 2 PATCH: 4 CREAM TOPICAL at 19:12

## 2023-07-12 RX ADMIN — LIDOCAINE 2 PATCH: 4 CREAM TOPICAL at 18:45

## 2023-07-12 RX ADMIN — Medication 975 MILLIGRAM(S): at 11:09

## 2023-07-12 RX ADMIN — SODIUM ZIRCONIUM CYCLOSILICATE 10 GRAM(S): 10 POWDER, FOR SUSPENSION ORAL at 14:05

## 2023-07-12 RX ADMIN — Medication 4 MILLIGRAM(S): at 18:15

## 2023-07-12 NOTE — ED PROVIDER NOTE - PHYSICAL EXAMINATION
Physical Exam    Vital Signs: I have reviewed the initial vital signs.  Constitutional: appears stated age, no acute distress  Eyes: Conjunctiva pink, Sclera clear,   Cardiovascular: S1 and S2, regular rate, regular rhythm, well-perfused extremities, radial pulses equal and 2+, pedal pulses 2+ and equal  Respiratory: unlabored respiratory effort, clear to auscultation bilaterally no wheezing, rales and rhonchi  Gastrointestinal: soft, non-tender abdomen, no pulsatile mass, normal bowl sounds  Musculoskeletal: supple neck, no lower extremity edema, no midline tenderness, ttp to left hip   Integumentary: warm, dry, no rash  Neurologic: awake, alert, nvi

## 2023-07-12 NOTE — H&P ADULT - NSHPPHYSICALEXAM_GEN_ALL_CORE
VITALS:   T(C): 35.8 (07-12-23 @ 10:15), Max: 35.8 (07-12-23 @ 10:15)  HR: 89 (07-12-23 @ 10:15) (89 - 89)  BP: 175/91 (07-12-23 @ 10:15) (175/91 - 175/91)  RR: 20 (07-12-23 @ 10:15) (20 - 20)  SpO2: 96% (07-12-23 @ 10:15) (96% - 96%)    GENERAL: elderly female, NAD, lying in bed comfortably  HEAD:  Atraumatic, Normocephalic  EYES: EOMI, PERRLA, conjunctiva and sclera clear  ENT: Moist mucous membranes  NECK: Supple, No JVD  CHEST/LUNG: Clear to auscultation bilaterally; No rales, rhonchi, wheezing, or rubs. Unlabored respirations  HEART: Tachycardic and regular rhythm, + systolic murmur. No rubs, or gallops.   ABDOMEN: BSx4; Soft, nontender, nondistended  EXTREMITIES:  2+ Peripheral Pulses, brisk capillary refill. L knee swelling. Tender to palpation of L hip   NERVOUS SYSTEM:  A&Ox3, no focal deficits   SKIN: No rashes or lesions

## 2023-07-12 NOTE — ED PROVIDER NOTE - OBJECTIVE STATEMENT
89 yo female, pmh of copd, presents to er for left hip pain, pt fell 4 days ago had pan scan showed age indeterminate rib fxs, since fall unable to ambulate with walker at home. Denies fever, chills, cp, sob, neck pain, visual changes, nvd, dizziness, numbness, tingling, new falls.

## 2023-07-12 NOTE — H&P ADULT - NS ATTEND AMEND GEN_ALL_CORE FT
pt seen and examined myself in ER  [id number]  ctabl  sofntnt+bs  nocce  left lateral hip tenderness on palp  limited rom due to pain on abduction, adduction , flexion    # subacute fall  - no fractures - PT eval  - uses walker at home, refuses SNF - goal is to improve pain in order to ambulate and return home   # inability to ambulate likely due to trochanteric bursitis - greater troch pain syndrome  - pt only used tylenol at home   - will trial naproxen - short course to minimize side effects  - one dose dexamethasone  # thoracic aneuysm - outpt vascular follow up - pt would not want any surgical intervention,   - bp control     goc - done would not want to be intubated but is thinking about cpr - needs to discuss with family further about goc before signing molst

## 2023-07-12 NOTE — H&P ADULT - ASSESSMENT
89yo female with Hx of ascending thoracic aortic aneurysm who presents with L hip pain and inability to ambulate in setting of a fall sustained at home. Endorses frequent falls, all seemingly mechanical in nature. No prodromal symptoms or LOC to suggest syncope, cardiac, or neurologic causes. Still with hip pain and inability to ambulate despite negative imaging. Will cont pain control, have PT assess for dispo options.    #Frequent falls   #L Hip pain  #Inability to ambulate   #Chronic Knee pain  - PT/OT evals  - Pain control with tylenol and lidocaine patch for now   - Can consider ortho eval and MRI of hip if pain persists with inability ambulate     #5.6cm thoracic aortic aneurysm  - Incidental finding noted on recent CT A/P   - Does not appear surgery would be in lines with GOC, and surgery would be high risk given age  - Cont outpatient follow up

## 2023-07-12 NOTE — H&P ADULT - HISTORY OF PRESENT ILLNESS
89yo female with Hx of ascending thoracic aortic aneurysm who presents with L hip pain and inability to ambulate in setting of a fall sustained at home. Patient reports several falls at home over the past few months. Describes them as all mechanical, when she is standing and goes to walk or change positions. Four days prior to this admission, patient was in the kitchen and went to turn when she lost her balance and fell. She did not have any prodromal symptoms, did not lose consciousness, and had a mild headstirke on her forehead. She was reportedly on the ground for 10hrs before she was found. She presented to the ED at [facility] where she had benign labs and pan-scanned without any fracture or acute abnormalities, thus she was discharged home. Since being home, she has been unable to ambulate due to pain in her L hip when ambulating. She has no pain at rest. She denies any other symptoms of fever, chills, HA, sore throat, chest pain, SOB, palpitations, abdominal pain, nausea, vomiting, diarrhea. Per son, at patient's baseline she is AAOX3, though has been forgetful and repetitive at times. She ambulates with a walker and has a HHA 7hrs/5days.

## 2023-07-12 NOTE — H&P ADULT - NSHPLABSRESULTS_GEN_ALL_CORE
11.4   7.11  )-----------( 275      ( 12 Jul 2023 11:10 )             36.7   07-12    140  |  105  |  25<H>  ----------------------------<  95  5.3<H>   |  24  |  1.1    Ca    9.0      12 Jul 2023 11:10    TPro  7.2  /  Alb  4.1  /  TBili  0.7  /  DBili  x   /  AST  19  /  ALT  9   /  AlkPhos  80  07-12      PROCEDURE DATE:  07/12/2023          INTERPRETATION:  CT OF THE LEFT HIP WITHOUT CONTRAST    CLINICAL HISTORY: Pain. Evaluate for fracture.    TECHNIQUE: Images were obtained of the left hip without contrast. Coronal   and sagittal reformatted images were also provided.    COMPARISON: CT abdomen/pelvis of 7/7/2023.    FINDINGS:    BONES/JOINTS:  Osteopenia. No acute fracture or dislocation. Mild to moderate   degenerative change in the left hip joint. Small left hip joint effusion.    SOFT TISSUES:  Colonic anastomotic sutures partially imaged.      IMPRESSION:    Osteopenia without acute osseous abnormality.

## 2023-07-12 NOTE — PATIENT PROFILE ADULT - FALL HARM RISK - HARM RISK INTERVENTIONS
Assistance with ambulation/Assistance OOB with selected safe patient handling equipment/Communicate Risk of Fall with Harm to all staff/Discuss with provider need for PT consult/Monitor for mental status changes/Monitor gait and stability/Move patient closer to nurses' station/Reinforce activity limits and safety measures with patient and family/Reorient to person, place and time as needed/Tailored Fall Risk Interventions/Toileting schedule using arm’s reach rule for commode and bathroom/Use of alarms - bed, chair and/or voice tab/Visual Cue: Yellow wristband and red socks/Bed in lowest position, wheels locked, appropriate side rails in place/Call bell, personal items and telephone in reach/Instruct patient to call for assistance before getting out of bed or chair/Non-slip footwear when patient is out of bed/[place] to call system/Physically safe environment - no spills, clutter or unnecessary equipment/Purposeful Proactive Rounding/Room/bathroom lighting operational, light cord in reach

## 2023-07-12 NOTE — H&P ADULT - CONVERSATION DETAILS
Discussed code status with patient. At this time she would like to remain FULL code in the event of emergency

## 2023-07-12 NOTE — ED ADULT NURSE NOTE - NSFALLRISKINTERV_ED_ALL_ED
Assistance OOB with selected safe patient handling equipment if applicable/Assistance with ambulation/Communicate fall risk and risk factors to all staff, patient, and family/Monitor gait and stability/Provide visual cue: yellow wristband, yellow gown, etc/Reinforce activity limits and safety measures with patient and family/Call bell, personal items and telephone in reach/Instruct patient to call for assistance before getting out of bed/chair/stretcher/Non-slip footwear applied when patient is off stretcher/[place] to call system/Physically safe environment - no spills, clutter or unnecessary equipment/Purposeful Proactive Rounding/Room/bathroom lighting operational, light cord in reach

## 2023-07-12 NOTE — ED PROVIDER NOTE - CONSIDERATION OF ADMISSION OBSERVATION
Patient is status post fall unable to ambulate admitted for further evaluation and treatment. Consideration of Admission/Observation

## 2023-07-12 NOTE — ED ADULT TRIAGE NOTE - CHIEF COMPLAINT QUOTE
BIBA from home, patient had fall last week ago and was seen in ED. Patient reports next day her left hip started to hurt and she has been unable to walk since

## 2023-07-12 NOTE — ED ADULT TRIAGE NOTE - HEIGHT IN FEET
5 What Type Of Note Output Would You Prefer (Optional)?: Standard Output How Severe Is Your Rash?: severe Is This A New Presentation, Or A Follow-Up?: Rash

## 2023-07-13 ENCOUNTER — TRANSCRIPTION ENCOUNTER (OUTPATIENT)
Age: 88
End: 2023-07-13

## 2023-07-13 VITALS
HEART RATE: 72 BPM | SYSTOLIC BLOOD PRESSURE: 149 MMHG | RESPIRATION RATE: 18 BRPM | DIASTOLIC BLOOD PRESSURE: 68 MMHG | TEMPERATURE: 96 F | OXYGEN SATURATION: 86 %

## 2023-07-13 DIAGNOSIS — R26.81 UNSTEADINESS ON FEET: ICD-10-CM

## 2023-07-13 DIAGNOSIS — I71.9 AORTIC ANEURYSM OF UNSPECIFIED SITE, WITHOUT RUPTURE: ICD-10-CM

## 2023-07-13 DIAGNOSIS — M25.552 PAIN IN LEFT HIP: ICD-10-CM

## 2023-07-13 PROCEDURE — 99235 HOSP IP/OBS SAME DATE MOD 70: CPT

## 2023-07-13 RX ORDER — ACETAMINOPHEN 500 MG
2 TABLET ORAL
Qty: 0 | Refills: 0 | DISCHARGE
Start: 2023-07-13

## 2023-07-13 RX ORDER — ENOXAPARIN SODIUM 100 MG/ML
40 INJECTION SUBCUTANEOUS
Qty: 0 | Refills: 0 | DISCHARGE
Start: 2023-07-13

## 2023-07-13 RX ORDER — PANTOPRAZOLE SODIUM 20 MG/1
1 TABLET, DELAYED RELEASE ORAL
Qty: 0 | Refills: 0 | DISCHARGE
Start: 2023-07-13

## 2023-07-13 RX ORDER — TRAMADOL HYDROCHLORIDE 50 MG/1
0.5 TABLET ORAL
Qty: 0 | Refills: 0 | DISCHARGE
Start: 2023-07-13

## 2023-07-13 RX ORDER — LIDOCAINE 4 G/100G
2 CREAM TOPICAL
Qty: 0 | Refills: 0 | DISCHARGE
Start: 2023-07-13

## 2023-07-13 RX ORDER — LANOLIN ALCOHOL/MO/W.PET/CERES
1 CREAM (GRAM) TOPICAL
Qty: 0 | Refills: 0 | DISCHARGE
Start: 2023-07-13

## 2023-07-13 RX ADMIN — LIDOCAINE 2 PATCH: 4 CREAM TOPICAL at 05:14

## 2023-07-13 RX ADMIN — LIDOCAINE 2 PATCH: 4 CREAM TOPICAL at 11:43

## 2023-07-13 RX ADMIN — PANTOPRAZOLE SODIUM 40 MILLIGRAM(S): 20 TABLET, DELAYED RELEASE ORAL at 05:13

## 2023-07-13 RX ADMIN — Medication 250 MILLIGRAM(S): at 05:13

## 2023-07-13 RX ADMIN — Medication 250 MILLIGRAM(S): at 06:13

## 2023-07-13 NOTE — PHYSICAL THERAPY INITIAL EVALUATION ADULT - PERTINENT HX OF CURRENT PROBLEM, REHAB EVAL
Reason for Admission: Fall, L Hip pain  History of Present Illness:   89yo female with Hx of ascending thoracic aortic aneurysm who presents with L hip pain and inability to ambulate in setting of a fall sustained at home. Patient reports several falls at home over the past few months. Describes them as all mechanical, when she is standing and goes to walk or change positions. Four days prior to this admission, patient was in the kitchen and went to turn when she lost her balance and fell. She did not have any prodromal symptoms, did not lose consciousness, and had a mild headstirke on her forehead. She was reportedly on the ground for 10hrs before she was found. She presented to the ED at [facility] where she had benign labs and pan-scanned without any fracture or acute abnormalities, thus she was discharged home. Since being home, she has been unable to ambulate due to pain in her L hip when ambulating. She has no pain at rest. She denies any other symptoms of fever, chills, HA, sore throat, chest pain,

## 2023-07-13 NOTE — OCCUPATIONAL THERAPY INITIAL EVALUATION ADULT - GENERAL OBSERVATIONS, REHAB EVAL
09;30-10:00 Chart reviewed, ok to treat by Occupational Therapist as confirmed by SHERICE Bertrand, Pt received semi-carvalho's position +andrei VASQUES in NAD. Pt in agreement with OT IE.

## 2023-07-13 NOTE — OCCUPATIONAL THERAPY INITIAL EVALUATION ADULT - LIVES WITH, PROFILE
in a private house 3 steps to enter with rails then level inside; +bathtub with seat and grab bars and grab bars around toilet/alone

## 2023-07-13 NOTE — OCCUPATIONAL THERAPY INITIAL EVALUATION ADULT - LEVEL OF INDEPENDENCE: SUPINE/SIT, REHAB EVAL
contact guard Melolabial Transposition Flap Text: The defect edges were debeveled with a #15 scalpel blade.  Given the location of the defect and the proximity to free margins a melolabial flap was deemed most appropriate.  Using a sterile surgical marker, an appropriate melolabial transposition flap was drawn incorporating the defect.    The area thus outlined was incised deep to adipose tissue with a #15 scalpel blade.  The skin margins were undermined to an appropriate distance in all directions utilizing iris scissors.

## 2023-07-13 NOTE — DISCHARGE NOTE NURSING/CASE MANAGEMENT/SOCIAL WORK - PATIENT PORTAL LINK FT
You can access the FollowMyHealth Patient Portal offered by [facility] by registering at the following website: [email or web address]. By joining [facility]’s FollowMyHealth portal, you will also be able to view your health information using other applications (apps) compatible with our system.

## 2023-07-13 NOTE — OCCUPATIONAL THERAPY INITIAL EVALUATION ADULT - ADDITIONAL COMMENTS
PLOF obtained from patient. HHA 8 hrs/day 5 days/week. (+) Life alert (-) driving Has rails on bed as per patient.

## 2023-07-13 NOTE — PHYSICAL THERAPY INITIAL EVALUATION ADULT - GENERAL OBSERVATIONS, REHAB EVAL
8:45-9:12 Chart reviewed. Patient available to be seen for physical therapy, denies pain, confirmed with RN. Pt rec'd in bed + primafit in NAD.

## 2023-07-13 NOTE — DISCHARGE NOTE PROVIDER - NSDCMRMEDTOKEN_GEN_ALL_CORE_FT
acetaminophen 325 mg oral tablet: 2 tab(s) orally every 6 hours As needed Temp greater or equal to 38C (100.4F), Mild Pain (1 - 3)  aluminum hydroxide-magnesium hydroxide 200 mg-200 mg/5 mL oral suspension: 30 milliliter(s) orally every 4 hours As needed Dyspepsia  enoxaparin: 40 milligram(s) subcutaneous once a day  lidocaine 4% topical film: Apply topically to affected area every 24 hours as needed for  pain  melatonin 3 mg oral tablet: 1 tab(s) orally once a day (at bedtime) As needed Insomnia  naproxen 250 mg oral tablet: 1 tab(s) orally 2 times a day  pantoprazole 40 mg oral delayed release tablet: 1 tab(s) orally once a day (before a meal)  traMADol 50 mg oral tablet: 0.5 tab(s) orally 3 times a day As needed Severe Pain (7 - 10)

## 2023-07-13 NOTE — PHYSICAL THERAPY INITIAL EVALUATION ADULT - PLANNED THERAPY INTERVENTIONS, PT EVAL
(4) no impairment
balance training/bed mobility training/gait training/strengthening/transfer training

## 2023-07-13 NOTE — PROGRESS NOTE ADULT - SUBJECTIVE AND OBJECTIVE BOX
Chief Complaint: Patient is a 89y old  Female who presents with a chief complaint of Fall, L Hip pain (12 Jul 2023 13:01)      INTERVAL HPI/OVERNIGHT EVENTS: No acute events overnight. No CP, sob, headache, vertigo, paralysis. Patient reports that she has been evaluated by PT and typically uses ambulation-assist device. Has continued hip pain however improving.    ROS:  CONSTITUTIONAL: No fever, no chills  EYES: No eye pain, no acute blindness  ENMT: no pain in mouth, no cuts on tongue  RESPIRATORY: No cough, No sob  CARDIOVASCULAR: No CP, no palpitations  GASTROINTESTINAL: no abdominal pain, no n/v/d  GENITOURINARY: No dysuria, no hematuria  Heme/Lymph: No easy bruising, no swelling of neck lymph nodes  NEUROLOGICAL: No seizure, No acute paralysis  SKIN: No itching, no rashes  MUSCULOSKELETAL: No acute joint pain, no joint swelling    MEDICATIONS  (STANDING):  enoxaparin Injectable 40 milliGRAM(s) SubCutaneous every 24 hours  lidocaine   4% Patch 2 Patch Transdermal daily  naproxen 250 milliGRAM(s) Oral two times a day  pantoprazole    Tablet 40 milliGRAM(s) Oral before breakfast    MEDICATIONS  (PRN):  acetaminophen     Tablet .. 650 milliGRAM(s) Oral every 6 hours PRN Temp greater or equal to 38C (100.4F), Mild Pain (1 - 3)  aluminum hydroxide/magnesium hydroxide/simethicone Suspension 30 milliLiter(s) Oral every 4 hours PRN Dyspepsia  melatonin 3 milliGRAM(s) Oral at bedtime PRN Insomnia  traMADol 25 milliGRAM(s) Oral three times a day PRN Severe Pain (7 - 10)    Vital Signs Last 24 Hrs  T(C): 35.6 (13 Jul 2023 14:24), Max: 36.3 (12 Jul 2023 21:00)  T(F): 96.1 (13 Jul 2023 14:24), Max: 97.3 (12 Jul 2023 21:00)  HR: 72 (13 Jul 2023 14:24) (72 - 107)  BP: 149/68 (13 Jul 2023 14:24) (139/78 - 175/85)  BP(mean): --  RR: 18 (13 Jul 2023 14:24) (18 - 18)  SpO2: 86% (13 Jul 2023 14:24) (86% - 86%)        GENERAL: NAD, thin  HEAD:  Atraumatic, Normocephalic  EYES: EOMI, PERRL, conjunctiva and sclera clear  ENMT: MMM, good dentition  NECK: Supple, trachea midline  Lung: normal work of breathing, cta b/l  Cardiovascular: S1&S2+, rrr, no m/r/g appreciated; no pitting edema appreciated in b/l LE  ABDOMEN: bs+, soft, nt, nd, no appreciable masses  : No hand catheter, no CVA tenderness  Neuro: Alert and follows commands, no flaccid paralysis in extremities appreciated  SKIN: warm and dry, no visible purulence in exposed areas, normal skin turgor      LABS:                        11.4   7.11  )-----------( 275      ( 12 Jul 2023 11:10 )             36.7     07-12    140  |  105  |  25<H>  ----------------------------<  95  5.3<H>   |  24  |  1.1    Ca    9.0      12 Jul 2023 11:10    TPro  7.2  /  Alb  4.1  /  TBili  0.7  /  DBili  x   /  AST  19  /  ALT  9   /  AlkPhos  80  07-12    LIVER FUNCTIONS - ( 12 Jul 2023 11:10 )  Alb: 4.1 g/dL / Pro: 7.2 g/dL / ALK PHOS: 80 U/L / ALT: 9 U/L / AST: 19 U/L / GGT: x     / T. Bili 0.7 mg/dL / D. Bili x           Urinalysis Basic - ( 12 Jul 2023 11:10 )    Color: x / Appearance: x / SG: x / pH: x  Gluc: 95 mg/dL / Ketone: x  / Bili: x / Urobili: x   Blood: x / Protein: x / Nitrite: x   Leuk Esterase: x / RBC: x / WBC x   Sq Epi: x / Non Sq Epi: x / Bacteria: x        Microbiology:    RADIOLOGY & ADDITIONAL TESTS:    Imaging Personally Reviewed:  [X ] YES  [ ] NO CT hip does not demonstrate open fracture    Consultant(s) Notes Reviewed:  [ ] YES  [ ] NO    Care Discussed with Consultants/Other Providers [ ] YES  [ ] NO

## 2023-07-13 NOTE — DISCHARGE NOTE PROVIDER - NSDCCPCAREPLAN_GEN_ALL_CORE_FT
PRINCIPAL DISCHARGE DIAGNOSIS  Diagnosis: Gait instability  Assessment and Plan of Treatment:       SECONDARY DISCHARGE DIAGNOSES  Diagnosis: Aneurysm, aortic  Assessment and Plan of Treatment:

## 2023-07-13 NOTE — PROGRESS NOTE ADULT - PROBLEM SELECTOR PLAN 3
noted incidental finding. No acute chest pain. Patient will need to follow up as an outpatient however given her age and reported GOC at admission would not benefit from surgical intervention given elevated risk to benefit. Patient should still complete outpatient eval with cardiac specialist

## 2023-07-13 NOTE — DISCHARGE NOTE PROVIDER - NSDCCPGOAL_GEN_ALL_CORE_FT
Continue to work with physical therapy. You will need to follow up with your primary care physician to monitor the incidental finding of thoracic aortic aneurysm.

## 2023-07-13 NOTE — OCCUPATIONAL THERAPY INITIAL EVALUATION ADULT - PERTINENT HX OF CURRENT PROBLEM, REHAB EVAL
89yo female with Hx of ascending thoracic aortic aneurysm who presents with L hip pain and inability to ambulate in setting of a fall sustained at home. Patient reports several falls at home over the past few months. Describes them as all mechanical, when she is standing and goes to walk or change positions. Four days prior to this admission, patient was in the kitchen and went to turn when she lost her balance and fell. She did not have any prodromal symptoms, did not lose consciousness, and had a mild headstrike on her forehead. She was reportedly on the ground for 10hrs before she was found. She presented to the ED at [facility] where she had benign labs and pan-scanned without any fracture or acute abnormalities, thus she was discharged home. Since being home, she has been unable to ambulate due to pain in her L hip when ambulating. She has no pain at rest. She denies any other symptoms of fever, chills, HA, sore throat, chest pain, SOB, palpitations, abdominal pain, nausea, vomiting, diarrhea. Per son, at patient's baseline she is AAOX3, though has been forgetful and repetitive at times. She ambulates with a walker and has a HHA 8hrs/5days.   CT/X-Ray (-) for fractures or acute pathology. Age indeterminate 5th-7th rib fractures

## 2023-07-13 NOTE — OCCUPATIONAL THERAPY INITIAL EVALUATION ADULT - LEVEL OF INDEPENDENCE: TUB, REHAB EVAL
2/2 unable to bear weight onto left LE to step over/unable to perform 2/2 unable to bear weight onto left LE to step over; TBA when appropriate/unable to perform

## 2023-07-13 NOTE — DISCHARGE NOTE PROVIDER - HOSPITAL COURSE
89F w/ Thoracic AAA presents following mechanical fall and left hip pain admitted to medicine for gait instability. Patient at time of admission denies prodromal syndrome and during hospitalization did not have complaints other than left hip pain. CT of the hip did not demonstrate fracture. The patient was assessed by PT and determined to be a candidate for rehab. The patient has had pain controlled medically.

## 2023-07-13 NOTE — PROGRESS NOTE ADULT - ASSESSMENT
89F w/ hx of Thoracic AAA presents after mechanical fall resulting in hip pain admitted to medicine for gait instability. Patient denies prodromal syndrome and has concerning symptoms while inpatient. She has ambulated with PT and is a candidate for rehab. plan for discharge

## 2023-07-13 NOTE — PHYSICAL THERAPY INITIAL EVALUATION ADULT - ADDITIONAL COMMENTS
Pt reports she lives alone and has HHA 8 hrs/day x 5 days. Pt says she has 3 AMANDA with rails, then level living. Pt says she amb with RW PTA.

## 2023-07-13 NOTE — DISCHARGE NOTE NURSING/CASE MANAGEMENT/SOCIAL WORK - NSDCPEFALRISK_GEN_ALL_CORE
For information on Fall & Injury Prevention, visit: [email or web address] OR  [email or web address] OR  https://www.cdc.gov/steadi/patient.html Not applicable

## 2023-07-18 DIAGNOSIS — M25.562 PAIN IN LEFT KNEE: ICD-10-CM

## 2023-07-18 DIAGNOSIS — M25.561 PAIN IN RIGHT KNEE: ICD-10-CM

## 2023-07-18 DIAGNOSIS — Z79.899 OTHER LONG TERM (CURRENT) DRUG THERAPY: ICD-10-CM

## 2023-07-18 DIAGNOSIS — M25.552 PAIN IN LEFT HIP: ICD-10-CM

## 2023-07-18 DIAGNOSIS — G89.29 OTHER CHRONIC PAIN: ICD-10-CM

## 2023-07-18 DIAGNOSIS — R29.6 REPEATED FALLS: ICD-10-CM

## 2023-07-18 DIAGNOSIS — I71.21 ANEURYSM OF THE ASCENDING AORTA, WITHOUT RUPTURE: ICD-10-CM

## 2023-07-18 DIAGNOSIS — J44.9 CHRONIC OBSTRUCTIVE PULMONARY DISEASE, UNSPECIFIED: ICD-10-CM

## 2023-07-18 DIAGNOSIS — R26.89 OTHER ABNORMALITIES OF GAIT AND MOBILITY: ICD-10-CM

## 2023-07-18 DIAGNOSIS — Z91.81 HISTORY OF FALLING: ICD-10-CM

## 2023-07-25 ENCOUNTER — APPOINTMENT (OUTPATIENT)
Dept: ORTHOPEDIC SURGERY | Facility: ASSISTED LIVING FACILITY | Age: 88
End: 2023-07-25
Payer: MEDICARE

## 2023-07-25 DIAGNOSIS — M17.12 UNILATERAL PRIMARY OSTEOARTHRITIS, LEFT KNEE: ICD-10-CM

## 2023-07-25 PROCEDURE — 99309 SBSQ NF CARE MODERATE MDM 30: CPT

## 2023-07-27 PROBLEM — M17.12 ARTHRITIS OF KNEE, LEFT: Status: ACTIVE | Noted: 2023-07-27

## 2023-08-06 ENCOUNTER — INPATIENT (INPATIENT)
Facility: HOSPITAL | Age: 88
LOS: 1 days | Discharge: HOME HEALTH | DRG: 684 | End: 2023-08-08
Attending: STUDENT IN AN ORGANIZED HEALTH CARE EDUCATION/TRAINING PROGRAM | Admitting: INTERNAL MEDICINE
Payer: MEDICARE

## 2023-08-06 VITALS
DIASTOLIC BLOOD PRESSURE: 95 MMHG | HEART RATE: 88 BPM | RESPIRATION RATE: 18 BRPM | SYSTOLIC BLOOD PRESSURE: 144 MMHG | TEMPERATURE: 98 F | OXYGEN SATURATION: 97 %

## 2023-08-06 DIAGNOSIS — Z90.49 ACQUIRED ABSENCE OF OTHER SPECIFIED PARTS OF DIGESTIVE TRACT: Chronic | ICD-10-CM

## 2023-08-06 DIAGNOSIS — N17.9 ACUTE KIDNEY FAILURE, UNSPECIFIED: ICD-10-CM

## 2023-08-06 LAB
ALBUMIN SERPL ELPH-MCNC: 4.3 G/DL — SIGNIFICANT CHANGE UP (ref 3.5–5.2)
ALP SERPL-CCNC: 72 U/L — SIGNIFICANT CHANGE UP (ref 30–115)
ALT FLD-CCNC: 8 U/L — SIGNIFICANT CHANGE UP (ref 0–41)
ANION GAP SERPL CALC-SCNC: 15 MMOL/L — HIGH (ref 7–14)
APPEARANCE UR: CLEAR — SIGNIFICANT CHANGE UP
AST SERPL-CCNC: 16 U/L — SIGNIFICANT CHANGE UP (ref 0–41)
BACTERIA # UR AUTO: SIGNIFICANT CHANGE UP /HPF
BASE EXCESS BLDV CALC-SCNC: -2.3 MMOL/L — LOW (ref -2–3)
BASOPHILS # BLD AUTO: 0.07 K/UL — SIGNIFICANT CHANGE UP (ref 0–0.2)
BASOPHILS NFR BLD AUTO: 1.2 % — HIGH (ref 0–1)
BILIRUB SERPL-MCNC: 1.1 MG/DL — SIGNIFICANT CHANGE UP (ref 0.2–1.2)
BILIRUB UR-MCNC: NEGATIVE — SIGNIFICANT CHANGE UP
BUN SERPL-MCNC: 27 MG/DL — HIGH (ref 10–20)
CA-I SERPL-SCNC: 1.17 MMOL/L — SIGNIFICANT CHANGE UP (ref 1.15–1.33)
CALCIUM SERPL-MCNC: 9.4 MG/DL — SIGNIFICANT CHANGE UP (ref 8.4–10.5)
CHLORIDE SERPL-SCNC: 104 MMOL/L — SIGNIFICANT CHANGE UP (ref 98–110)
CO2 SERPL-SCNC: 20 MMOL/L — SIGNIFICANT CHANGE UP (ref 17–32)
COLOR SPEC: YELLOW — SIGNIFICANT CHANGE UP
CREAT SERPL-MCNC: 1.7 MG/DL — HIGH (ref 0.7–1.5)
DIFF PNL FLD: ABNORMAL
EGFR: 27 ML/MIN/1.73M2 — LOW
EOSINOPHIL # BLD AUTO: 0.08 K/UL — SIGNIFICANT CHANGE UP (ref 0–0.7)
EOSINOPHIL NFR BLD AUTO: 1.4 % — SIGNIFICANT CHANGE UP (ref 0–8)
EPI CELLS # UR: ABNORMAL /HPF (ref 0–5)
GAS PNL BLDV: 135 MMOL/L — LOW (ref 136–145)
GAS PNL BLDV: SIGNIFICANT CHANGE UP
GAS PNL BLDV: SIGNIFICANT CHANGE UP
GLUCOSE SERPL-MCNC: 131 MG/DL — HIGH (ref 70–99)
GLUCOSE UR QL: NEGATIVE MG/DL — SIGNIFICANT CHANGE UP
HCO3 BLDV-SCNC: 22 MMOL/L — SIGNIFICANT CHANGE UP (ref 22–29)
HCT VFR BLD CALC: 34.4 % — LOW (ref 37–47)
HCT VFR BLDA CALC: 35 % — LOW (ref 39–51)
HGB BLD CALC-MCNC: 11.8 G/DL — LOW (ref 12.6–17.4)
HGB BLD-MCNC: 11.1 G/DL — LOW (ref 12–16)
IMM GRANULOCYTES NFR BLD AUTO: 0.7 % — HIGH (ref 0.1–0.3)
KETONES UR-MCNC: NEGATIVE — SIGNIFICANT CHANGE UP
LACTATE BLDV-MCNC: 1.1 MMOL/L — SIGNIFICANT CHANGE UP (ref 0.5–2)
LEUKOCYTE ESTERASE UR-ACNC: NEGATIVE — SIGNIFICANT CHANGE UP
LIDOCAIN IGE QN: 30 U/L — SIGNIFICANT CHANGE UP (ref 7–60)
LYMPHOCYTES # BLD AUTO: 1.01 K/UL — LOW (ref 1.2–3.4)
LYMPHOCYTES # BLD AUTO: 17.2 % — LOW (ref 20.5–51.1)
MAGNESIUM SERPL-MCNC: 2.1 MG/DL — SIGNIFICANT CHANGE UP (ref 1.8–2.4)
MCHC RBC-ENTMCNC: 26.7 PG — LOW (ref 27–31)
MCHC RBC-ENTMCNC: 32.3 G/DL — SIGNIFICANT CHANGE UP (ref 32–37)
MCV RBC AUTO: 82.9 FL — SIGNIFICANT CHANGE UP (ref 81–99)
MONOCYTES # BLD AUTO: 0.4 K/UL — SIGNIFICANT CHANGE UP (ref 0.1–0.6)
MONOCYTES NFR BLD AUTO: 6.8 % — SIGNIFICANT CHANGE UP (ref 1.7–9.3)
NEUTROPHILS # BLD AUTO: 4.27 K/UL — SIGNIFICANT CHANGE UP (ref 1.4–6.5)
NEUTROPHILS NFR BLD AUTO: 72.7 % — SIGNIFICANT CHANGE UP (ref 42.2–75.2)
NITRITE UR-MCNC: NEGATIVE — SIGNIFICANT CHANGE UP
NRBC # BLD: 0 /100 WBCS — SIGNIFICANT CHANGE UP (ref 0–0)
PCO2 BLDV: 38 MMHG — LOW (ref 39–42)
PH BLDV: 7.38 — SIGNIFICANT CHANGE UP (ref 7.32–7.43)
PH UR: 7 — SIGNIFICANT CHANGE UP (ref 5–8)
PLATELET # BLD AUTO: 262 K/UL — SIGNIFICANT CHANGE UP (ref 130–400)
PMV BLD: 9 FL — SIGNIFICANT CHANGE UP (ref 7.4–10.4)
PO2 BLDV: 57 MMHG — SIGNIFICANT CHANGE UP
POTASSIUM BLDV-SCNC: 4.4 MMOL/L — SIGNIFICANT CHANGE UP (ref 3.5–5.1)
POTASSIUM SERPL-MCNC: 4.8 MMOL/L — SIGNIFICANT CHANGE UP (ref 3.5–5)
POTASSIUM SERPL-SCNC: 4.8 MMOL/L — SIGNIFICANT CHANGE UP (ref 3.5–5)
PROT SERPL-MCNC: 7.1 G/DL — SIGNIFICANT CHANGE UP (ref 6–8)
PROT UR-MCNC: ABNORMAL MG/DL
RBC # BLD: 4.15 M/UL — LOW (ref 4.2–5.4)
RBC # FLD: 18.2 % — HIGH (ref 11.5–14.5)
RBC CASTS # UR COMP ASSIST: ABNORMAL /HPF (ref 0–4)
SAO2 % BLDV: 88.7 % — SIGNIFICANT CHANGE UP
SODIUM SERPL-SCNC: 139 MMOL/L — SIGNIFICANT CHANGE UP (ref 135–146)
SP GR SPEC: 1.02 — SIGNIFICANT CHANGE UP (ref 1.01–1.03)
TROPONIN T SERPL-MCNC: <0.01 NG/ML — SIGNIFICANT CHANGE UP
UROBILINOGEN FLD QL: 0.2 MG/DL — SIGNIFICANT CHANGE UP
WBC # BLD: 5.87 K/UL — SIGNIFICANT CHANGE UP (ref 4.8–10.8)
WBC # FLD AUTO: 5.87 K/UL — SIGNIFICANT CHANGE UP (ref 4.8–10.8)
WBC UR QL: SIGNIFICANT CHANGE UP /HPF (ref 0–5)

## 2023-08-06 PROCEDURE — 85025 COMPLETE CBC W/AUTO DIFF WBC: CPT

## 2023-08-06 PROCEDURE — 93010 ELECTROCARDIOGRAM REPORT: CPT

## 2023-08-06 PROCEDURE — 80053 COMPREHEN METABOLIC PANEL: CPT

## 2023-08-06 PROCEDURE — 36415 COLL VENOUS BLD VENIPUNCTURE: CPT

## 2023-08-06 PROCEDURE — 99285 EMERGENCY DEPT VISIT HI MDM: CPT

## 2023-08-06 PROCEDURE — 85027 COMPLETE CBC AUTOMATED: CPT

## 2023-08-06 PROCEDURE — 83735 ASSAY OF MAGNESIUM: CPT

## 2023-08-06 PROCEDURE — 84100 ASSAY OF PHOSPHORUS: CPT

## 2023-08-06 PROCEDURE — 97162 PT EVAL MOD COMPLEX 30 MIN: CPT | Mod: GP

## 2023-08-06 PROCEDURE — 74177 CT ABD & PELVIS W/CONTRAST: CPT | Mod: 26,MA

## 2023-08-06 PROCEDURE — 99221 1ST HOSP IP/OBS SF/LOW 40: CPT

## 2023-08-06 RX ORDER — SODIUM CHLORIDE 9 MG/ML
1000 INJECTION INTRAMUSCULAR; INTRAVENOUS; SUBCUTANEOUS ONCE
Refills: 0 | Status: COMPLETED | OUTPATIENT
Start: 2023-08-06 | End: 2023-08-06

## 2023-08-06 RX ORDER — ONDANSETRON 8 MG/1
4 TABLET, FILM COATED ORAL ONCE
Refills: 0 | Status: COMPLETED | OUTPATIENT
Start: 2023-08-06 | End: 2023-08-06

## 2023-08-06 RX ORDER — DIATRIZOATE MEGLUMINE 180 MG/ML
30 INJECTION, SOLUTION INTRAVESICAL ONCE
Refills: 0 | Status: COMPLETED | OUTPATIENT
Start: 2023-08-06 | End: 2023-08-06

## 2023-08-06 RX ADMIN — DIATRIZOATE MEGLUMINE 30 MILLILITER(S): 180 INJECTION, SOLUTION INTRAVESICAL at 17:45

## 2023-08-06 RX ADMIN — ONDANSETRON 4 MILLIGRAM(S): 8 TABLET, FILM COATED ORAL at 17:53

## 2023-08-06 RX ADMIN — SODIUM CHLORIDE 1000 MILLILITER(S): 9 INJECTION INTRAMUSCULAR; INTRAVENOUS; SUBCUTANEOUS at 17:53

## 2023-08-06 RX ADMIN — ONDANSETRON 4 MILLIGRAM(S): 8 TABLET, FILM COATED ORAL at 21:15

## 2023-08-06 NOTE — H&P ADULT - NSVTERISKREFERASSESS_GEN_ALL_CORE
Refer to the Assessment tab to view/cancel completed assessment.
I have personally provided the amount of critical care time documented below excluding time spent on separate procedures.

## 2023-08-06 NOTE — ED PROVIDER NOTE - OBJECTIVE STATEMENT
89 y/o female presents to the ED with nausea and decreased po intake . patient was released from short term rehab one week ago where she was treated for UTI . patient not currently on antibx. no diarrhea. no abdominal pain . patient with decreased appetite. patient with prior hx of colon resection with reversal of colostomy in past. no fevers.

## 2023-08-06 NOTE — H&P ADULT - NSHPLABSRESULTS_GEN_ALL_CORE
11.1   5.87  )-----------( 262      ( 06 Aug 2023 17:55 )             34.4     08-    139  |  104  |  27<H>  ----------------------------<  131<H>  4.8   |  20  |  1.7<H>    Ca    9.4      06 Aug 2023 17:55  Mg     2.1     -    TPro  7.1  /  Alb  4.3  /  TBili  1.1  /  DBili  x   /  AST  16  /  ALT  8   /  AlkPhos  72  -          Urinalysis Basic - ( 06 Aug 2023 18:45 )    Color: Yellow / Appearance: Clear / S.020 / pH: x  Gluc: x / Ketone: Negative  / Bili: Negative / Urobili: 0.2 mg/dL   Blood: x / Protein: Trace mg/dL / Nitrite: Negative   Leuk Esterase: Negative / RBC: 11-25 /HPF / WBC 3-5 /HPF   Sq Epi: x / Non Sq Epi: x / Bacteria: occ /HPF        Lactate Trend    CARDIAC MARKERS ( 06 Aug 2023 17:55 )  x     / <0.01 ng/mL / x     / x     / x          CAPILLARY BLOOD GLUCOSE

## 2023-08-06 NOTE — ED PROVIDER NOTE - PHYSICAL EXAMINATION
Vital Signs: I have reviewed the initial vital signs.  Constitutional: elderly and frail  Eyes: PERRLA, EOMI, , clear conjunctiva  ENT: MMM,   Cardiovascular: regular rate, regular rhythm, no murmur appreciated  Respiratory: unlabored respiratory effort, clear to auscultation bilaterally  Gastrointestinal: soft, non-tender, non-distended  abdomen, no pulsatile mass  Musculoskeletal: supple neck, no lower extremity edema, no bony tenderness  Integumentary: warm, dry, no rash  Neurologic: awake, alert, cranial nerves II-XII grossly intact, extremities’ motor and sensory functions grossly intact, no focal deficits

## 2023-08-06 NOTE — H&P ADULT - ASSESSMENT
BERKLEY (suspect pre renal)    Medication management  BERKLEY (suspect pre renal)    Medication management     Advance Directives - at this time  BERKLEY (suspect pre renal) - IV fluids and repeat labs in am    Medication management - patient does not know her medication names, no list in available EMR. Would review with family when able    Advance Directives - at this time patient cannot make informed decision, awake family input

## 2023-08-06 NOTE — ED ADULT NURSE NOTE - NSFALLHARMRISKINTERV_ED_ALL_ED

## 2023-08-06 NOTE — ED PROVIDER NOTE - CARE PLAN
1 Principal Discharge DX:	BERKLEY (acute kidney injury)  Secondary Diagnosis:	Nausea with vomiting

## 2023-08-06 NOTE — ED PROVIDER NOTE - ATTENDING APP SHARED VISIT CONTRIBUTION OF CARE
89-year-old female past medical history of ulcers, thoracic aneurysm, history of colon resection presents from home with her sons for evaluation of nausea and vomiting for the last 2 days.  Patient has not been able to tolerate any p.o. and has had decreased appetite.  Family is concerned for UTI.  Patient's was recently admitted after a fall and discharged from short-term rehab earlier this week.  No chest pain or shortness of breath, on exam patient is alert and awake, elderly, appears frail, dry lips, OP clear, lungs CTA B/L, abdomen is soft, nontender nondistended, multiple areas of ecchymosis to lower abdomen, no edema

## 2023-08-06 NOTE — H&P ADULT - HISTORY OF PRESENT ILLNESS
(No family at bedside, original chart apparently mislabelled)  (No family at bedside, patient somewhat forgetful. In addition, original chart apparently mislabelled. ER documents used extensively) 89yo female, recently discharged from a rehab facility where she did have treatment for a UTI, is sent to the ER due to poor oral intake. No abdominal pain or fevers noted but there was nausea. Patient does say she is normally a poor eater unless the food is good. Adds that she currently feels well (No family at bedside, patient somewhat forgetful. In addition, original chart apparently mislabelled. ER documents used extensively) 89yo female, recently discharged from a rehab facility where she did have treatment for a UTI, is sent to the ER due to poor oral intake. No abdominal pain or fevers noted but there was nausea. Patient does say she is normally a poor eater unless the food is good. Adds that she currently feels well. ER workup revealed BERKLEY

## 2023-08-06 NOTE — ED PROVIDER NOTE - CLINICAL SUMMARY MEDICAL DECISION MAKING FREE TEXT BOX
Labs and imaging obtained.  Patient treated with antiemetics.  Patient required multiple doses.  Patient found to have elevation in BUN and creatinine.  Old labs from previous admission 1 July 12 reviewed.  BUN and creatinine at that time were 25/1.1 GFR was 46 at that time.  Patient was treated with IV fluids.  Plan is to admit to the hospital at this time

## 2023-08-06 NOTE — ED PROVIDER NOTE - DISCUSSED CASE WITH MULTISELECT OPTIONS
SURGEON:  Dr. Molly Zaldivar  Assistant:  None    Date of procedure:  2020    Preoperative Diagnosis:  Recurrent acute otitis media    Postoperative Diagnosis:  Same    Procedure:  Bilateral ear tube placement    Findings:  Right ear tympanic membrane serous effusion, Left ear tympanic membrane serous effusion    Anesthesia:  Mask    Blood loss:  None    Medications administered in OR:  Floxin to bilateral ears    Specimens:  None    Prosthetic devices, grafts, tissues or devices implanted:  Bilateral Medtronic Turpin beveled grommet tympanostomy tube    Indications for procedure:   Patient present to ENT clinic with complaints of recurrent acute otitis media.  Risks and benefits of tube placement were extensively discussed with the child's guardians, and they elected to proceed with the procedure.    Procedure in detail:  After appropriate consents were obtained, the patient was taken to the Operating Room and placed on the operating table in a supine position.  After anesthesia achieved an adequate level of mask anesthetic, the binocular operating microscope was brought into the field.    Her right EAC was found to have a small amount of cerumen that was carefully cleaned with a curette.  The tympanic membrane was then visualized, and was found to be serous effusion.  A radial myringotomy was then made in the anterior-inferior quadrant of the tympanic membrane, and a #5 Portillo tip suction was used to clear the middle ear.  With an alligator forceps, an Turpin beveled grommet tube was then placed into the myringotomy site without difficulty.  A #3 Portillo tip suction was then used to ensure that the tube was patent and in good position.  Several floxin drops were then placed into the EAC and were visually confirmed to pass through the tube.  A cotton ball was then placed in the EAC, and attention was then turned to the left ear.    Her left EAC was found to have a small amount of cerumen that was  carefully cleaned with a curette.  The tympanic membrane was then visualized, and was found to be serous effusion.  A radial myringotomy was then made in the anterior-inferior quadrant of the tympanic membrane, and a #5 Portillo tip suction was used to clear the middle ear.  With an alligator forceps, an Turpin beveled grommet tube was then placed into the myringotomy site without difficulty.  A #3 Portillo tip suction was then used to ensure that the tube was patent and in good position.  Several floxin drops were then placed into the EAC and were visually confirmed to pass through the tube.  A cotton ball was then placed in the EAC.    The patient was then handed over to Anesthesia, at which time she was awakened without difficulty and brought to the recovery room in good condition.   Admitting MD

## 2023-08-06 NOTE — H&P ADULT - NSICDXPASTSURGICALHX_GEN_ALL_CORE_FT
PAST SURGICAL HISTORY:  Surgical history unknown      PAST SURGICAL HISTORY:  History of colon resection     Surgical history unknown

## 2023-08-07 DIAGNOSIS — Z90.49 ACQUIRED ABSENCE OF OTHER SPECIFIED PARTS OF DIGESTIVE TRACT: Chronic | ICD-10-CM

## 2023-08-07 DIAGNOSIS — Z78.9 OTHER SPECIFIED HEALTH STATUS: Chronic | ICD-10-CM

## 2023-08-07 LAB
ALBUMIN SERPL ELPH-MCNC: 3.8 G/DL — SIGNIFICANT CHANGE UP (ref 3.5–5.2)
ALP SERPL-CCNC: 70 U/L — SIGNIFICANT CHANGE UP (ref 30–115)
ALT FLD-CCNC: 7 U/L — SIGNIFICANT CHANGE UP (ref 0–41)
ANION GAP SERPL CALC-SCNC: 11 MMOL/L — SIGNIFICANT CHANGE UP (ref 7–14)
AST SERPL-CCNC: 14 U/L — SIGNIFICANT CHANGE UP (ref 0–41)
BILIRUB SERPL-MCNC: 0.6 MG/DL — SIGNIFICANT CHANGE UP (ref 0.2–1.2)
BUN SERPL-MCNC: 23 MG/DL — HIGH (ref 10–20)
CALCIUM SERPL-MCNC: 9 MG/DL — SIGNIFICANT CHANGE UP (ref 8.4–10.5)
CHLORIDE SERPL-SCNC: 105 MMOL/L — SIGNIFICANT CHANGE UP (ref 98–110)
CO2 SERPL-SCNC: 26 MMOL/L — SIGNIFICANT CHANGE UP (ref 17–32)
CREAT SERPL-MCNC: 1.5 MG/DL — SIGNIFICANT CHANGE UP (ref 0.7–1.5)
CULTURE RESULTS: SIGNIFICANT CHANGE UP
EGFR: 32 ML/MIN/1.73M2 — LOW
GLUCOSE SERPL-MCNC: 77 MG/DL — SIGNIFICANT CHANGE UP (ref 70–99)
HCT VFR BLD CALC: 33.4 % — LOW (ref 37–47)
HGB BLD-MCNC: 10.4 G/DL — LOW (ref 12–16)
MAGNESIUM SERPL-MCNC: 2.3 MG/DL — SIGNIFICANT CHANGE UP (ref 1.8–2.4)
MCHC RBC-ENTMCNC: 26.3 PG — LOW (ref 27–31)
MCHC RBC-ENTMCNC: 31.1 G/DL — LOW (ref 32–37)
MCV RBC AUTO: 84.3 FL — SIGNIFICANT CHANGE UP (ref 81–99)
NRBC # BLD: 0 /100 WBCS — SIGNIFICANT CHANGE UP (ref 0–0)
PHOSPHATE SERPL-MCNC: 3.5 MG/DL — SIGNIFICANT CHANGE UP (ref 2.1–4.9)
PLATELET # BLD AUTO: 262 K/UL — SIGNIFICANT CHANGE UP (ref 130–400)
PMV BLD: 9.5 FL — SIGNIFICANT CHANGE UP (ref 7.4–10.4)
POTASSIUM SERPL-MCNC: 4.6 MMOL/L — SIGNIFICANT CHANGE UP (ref 3.5–5)
POTASSIUM SERPL-SCNC: 4.6 MMOL/L — SIGNIFICANT CHANGE UP (ref 3.5–5)
PROT SERPL-MCNC: 6.4 G/DL — SIGNIFICANT CHANGE UP (ref 6–8)
RBC # BLD: 3.96 M/UL — LOW (ref 4.2–5.4)
RBC # FLD: 18.2 % — HIGH (ref 11.5–14.5)
SODIUM SERPL-SCNC: 142 MMOL/L — SIGNIFICANT CHANGE UP (ref 135–146)
SPECIMEN SOURCE: SIGNIFICANT CHANGE UP
WBC # BLD: 5.62 K/UL — SIGNIFICANT CHANGE UP (ref 4.8–10.8)
WBC # FLD AUTO: 5.62 K/UL — SIGNIFICANT CHANGE UP (ref 4.8–10.8)

## 2023-08-07 RX ORDER — SODIUM CHLORIDE 9 MG/ML
1000 INJECTION, SOLUTION INTRAVENOUS
Refills: 0 | Status: DISCONTINUED | OUTPATIENT
Start: 2023-08-07 | End: 2023-08-07

## 2023-08-07 RX ORDER — PANTOPRAZOLE SODIUM 20 MG/1
1 TABLET, DELAYED RELEASE ORAL
Qty: 0 | Refills: 0 | DISCHARGE

## 2023-08-07 RX ORDER — PANTOPRAZOLE SODIUM 20 MG/1
40 TABLET, DELAYED RELEASE ORAL
Refills: 0 | Status: DISCONTINUED | OUTPATIENT
Start: 2023-08-07 | End: 2023-08-08

## 2023-08-07 RX ORDER — OMEPRAZOLE 10 MG/1
0 CAPSULE, DELAYED RELEASE ORAL
Qty: 0 | Refills: 0 | DISCHARGE

## 2023-08-07 RX ADMIN — SODIUM CHLORIDE 75 MILLILITER(S): 9 INJECTION, SOLUTION INTRAVENOUS at 06:03

## 2023-08-07 NOTE — CHART NOTE - NSCHARTNOTEFT_GEN_A_CORE
.                         10.4   5.62  )-----------( 262      ( 07 Aug 2023 05:36 )             33.4     08-07    142  |  105  |  23<H>  ----------------------------<  77  4.6   |  26  |  1.5    Ca    9.0      07 Aug 2023 05:36  Phos  3.5     08-07  Mg     2.3     08-07    TPro  6.4  /  Alb  3.8  /  TBili  0.6  /  DBili  x   /  AST  14  /  ALT  7   /  AlkPhos  70  08-07        Patient seen by admitting attending early morning today.    Labs reviewed.    d/c IV fluids. encourage po intake    Anticipate for discharge tomorrow.
spoke w/ pt's daughter, Liliana, to complete pt's medication reconciliation; pt's daughter states pt only takes Pantoprazole QD for h/o ulcer. Pt's med rec updated accordingly

## 2023-08-07 NOTE — PATIENT PROFILE ADULT - TOBACCO USE
[facility] Call Center    Phone Message    May a detailed message be left on voicemail: yes     Reason for Call: Other: patient would like to know if there is an issue with getting a refill for this medication, insulin aspart (NOVOLOG FLEXPEN) 100 UNIT/ML injection [[id number]] (Order [id number]) . please advise     Action Taken: Message routed to:  Adult Clinics: Endocrinology p [phone]                                                                            
No refill requests have been received for patient's Nolvolog.    Contacted patient to review. Patient reports that [facility] noted they sent two requests. Patient will check in with [facility] to confirm sending to correct clinic.    Patient confirms she is taking:   Novolog 1 unit per 15 gram CHO for BF and lunch, and 1 unit per 10 gram of CHO for dinner  plus 1 unit per 50 mg/dl above 150, approx 30 units daily.     Will send to Dr. Aguero to update dosing signature.       Clarisa Nunez RN  Endocrine Care Coordinator  [facility]    
Never smoker

## 2023-08-07 NOTE — PATIENT PROFILE ADULT - FALL HARM RISK - HARM RISK INTERVENTIONS

## 2023-08-08 ENCOUNTER — TRANSCRIPTION ENCOUNTER (OUTPATIENT)
Age: 88
End: 2023-08-08

## 2023-08-08 VITALS
HEART RATE: 73 BPM | TEMPERATURE: 96 F | RESPIRATION RATE: 18 BRPM | SYSTOLIC BLOOD PRESSURE: 130 MMHG | DIASTOLIC BLOOD PRESSURE: 58 MMHG

## 2023-08-08 LAB
ALBUMIN SERPL ELPH-MCNC: 4 G/DL — SIGNIFICANT CHANGE UP (ref 3.5–5.2)
ALP SERPL-CCNC: 74 U/L — SIGNIFICANT CHANGE UP (ref 30–115)
ALT FLD-CCNC: 7 U/L — SIGNIFICANT CHANGE UP (ref 0–41)
ANION GAP SERPL CALC-SCNC: 10 MMOL/L — SIGNIFICANT CHANGE UP (ref 7–14)
AST SERPL-CCNC: 16 U/L — SIGNIFICANT CHANGE UP (ref 0–41)
BASOPHILS # BLD AUTO: 0.05 K/UL — SIGNIFICANT CHANGE UP (ref 0–0.2)
BASOPHILS NFR BLD AUTO: 0.8 % — SIGNIFICANT CHANGE UP (ref 0–1)
BILIRUB SERPL-MCNC: 0.7 MG/DL — SIGNIFICANT CHANGE UP (ref 0.2–1.2)
BUN SERPL-MCNC: 19 MG/DL — SIGNIFICANT CHANGE UP (ref 10–20)
CALCIUM SERPL-MCNC: 9.8 MG/DL — SIGNIFICANT CHANGE UP (ref 8.4–10.5)
CHLORIDE SERPL-SCNC: 105 MMOL/L — SIGNIFICANT CHANGE UP (ref 98–110)
CO2 SERPL-SCNC: 27 MMOL/L — SIGNIFICANT CHANGE UP (ref 17–32)
CREAT SERPL-MCNC: 1.5 MG/DL — SIGNIFICANT CHANGE UP (ref 0.7–1.5)
EGFR: 32 ML/MIN/1.73M2 — LOW
EOSINOPHIL # BLD AUTO: 0.26 K/UL — SIGNIFICANT CHANGE UP (ref 0–0.7)
EOSINOPHIL NFR BLD AUTO: 4.2 % — SIGNIFICANT CHANGE UP (ref 0–8)
GLUCOSE SERPL-MCNC: 119 MG/DL — HIGH (ref 70–99)
HCT VFR BLD CALC: 38.4 % — SIGNIFICANT CHANGE UP (ref 37–47)
HGB BLD-MCNC: 12 G/DL — SIGNIFICANT CHANGE UP (ref 12–16)
IMM GRANULOCYTES NFR BLD AUTO: 0.8 % — HIGH (ref 0.1–0.3)
LYMPHOCYTES # BLD AUTO: 1.31 K/UL — SIGNIFICANT CHANGE UP (ref 1.2–3.4)
LYMPHOCYTES # BLD AUTO: 21.3 % — SIGNIFICANT CHANGE UP (ref 20.5–51.1)
MAGNESIUM SERPL-MCNC: 2.2 MG/DL — SIGNIFICANT CHANGE UP (ref 1.8–2.4)
MCHC RBC-ENTMCNC: 26.4 PG — LOW (ref 27–31)
MCHC RBC-ENTMCNC: 31.3 G/DL — LOW (ref 32–37)
MCV RBC AUTO: 84.6 FL — SIGNIFICANT CHANGE UP (ref 81–99)
MONOCYTES # BLD AUTO: 0.43 K/UL — SIGNIFICANT CHANGE UP (ref 0.1–0.6)
MONOCYTES NFR BLD AUTO: 7 % — SIGNIFICANT CHANGE UP (ref 1.7–9.3)
NEUTROPHILS # BLD AUTO: 4.06 K/UL — SIGNIFICANT CHANGE UP (ref 1.4–6.5)
NEUTROPHILS NFR BLD AUTO: 65.9 % — SIGNIFICANT CHANGE UP (ref 42.2–75.2)
NRBC # BLD: 0 /100 WBCS — SIGNIFICANT CHANGE UP (ref 0–0)
PLATELET # BLD AUTO: 262 K/UL — SIGNIFICANT CHANGE UP (ref 130–400)
PMV BLD: 9.4 FL — SIGNIFICANT CHANGE UP (ref 7.4–10.4)
POTASSIUM SERPL-MCNC: 5 MMOL/L — SIGNIFICANT CHANGE UP (ref 3.5–5)
POTASSIUM SERPL-SCNC: 5 MMOL/L — SIGNIFICANT CHANGE UP (ref 3.5–5)
PROT SERPL-MCNC: 7 G/DL — SIGNIFICANT CHANGE UP (ref 6–8)
RBC # BLD: 4.54 M/UL — SIGNIFICANT CHANGE UP (ref 4.2–5.4)
RBC # FLD: 18.4 % — HIGH (ref 11.5–14.5)
SODIUM SERPL-SCNC: 142 MMOL/L — SIGNIFICANT CHANGE UP (ref 135–146)
WBC # BLD: 6.16 K/UL — SIGNIFICANT CHANGE UP (ref 4.8–10.8)
WBC # FLD AUTO: 6.16 K/UL — SIGNIFICANT CHANGE UP (ref 4.8–10.8)

## 2023-08-08 PROCEDURE — 99238 HOSP IP/OBS DSCHRG MGMT 30/<: CPT | Mod: 1L

## 2023-08-08 RX ORDER — DIMENHYDRINATE 50 MG
1 TABLET ORAL
Qty: 4 | Refills: 0
Start: 2023-08-08 | End: 2023-08-09

## 2023-08-08 RX ADMIN — PANTOPRAZOLE SODIUM 40 MILLIGRAM(S): 20 TABLET, DELAYED RELEASE ORAL at 06:04

## 2023-08-08 NOTE — PHYSICAL THERAPY INITIAL EVALUATION ADULT - GENERAL OBSERVATIONS, REHAB EVAL
13:15 -13:45 Chart reviewed. Order received.  Patient is ok to be  seen for PT,confirmed with RN. pt encountered  Semi carvalho in the bed  , C/o rt knee pain, and agrees to participate in session, +  Heplock NAD. Son and Personal Aide at bedside .

## 2023-08-08 NOTE — DISCHARGE NOTE PROVIDER - CARE PROVIDER_API CALL
Shaji Donohue  Vascular Surgery  [address] [place] [address], [address]  [place] [address]  Phone: [phone]  Fax: [phone]  Follow Up Time:     Pina Tirado  Internal Medicine  [address] [place] [id number]  Phone: [phone]  Fax: ()-  Follow Up Time:

## 2023-08-08 NOTE — DISCHARGE NOTE PROVIDER - ATTENDING DISCHARGE PHYSICAL EXAMINATION:
VITALS:  T(F): 96.4 (08-08-23 @ 13:39), Max: 97.6 (08-08-23 @ 04:25)  HR: 73 (08-08-23 @ 13:39) (63 - 76)  BP: 130/58 (08-08-23 @ 13:39) (124/60 - 150/67)  RR: 18 (08-08-23 @ 13:39) (18 - 18)          PHYSICAL EXAM:  GENERAL: NAD, well-developed  CHEST/LUNG: CTAB; No wheeze  HEART: RRR; No murmurs, rubs, or gallops  ABDOMEN: Soft, NT/ND; BS present  EXTREMITIES:  No cyanosis, or edema  NEUROLOGY: AAOx3  SKIN: No rashes or lesions

## 2023-08-08 NOTE — DISCHARGE NOTE PROVIDER - HOSPITAL COURSE
89F with pmh of Thoracic AAA 89F w/ Thoracic AAA presented to the ED with nausea and decreased po intake. recently treated with Bactrim for UTI. In ED found to have BERKLEY and started on IV fluids.    # BERKLEY: baseline cr. 1.1  # Nausea  - s/p IV hydration.  - serum creatinine improved from 1.7 on admission to 1.5 today  - non-oliguric  - serum lytes wnl  - encourage po intake.    # UTI  - UCx negative  - Patient is afebrile, no leukocytosis  - monitored off antibiotics.       # 4 cm wide fluid-filled structure within lower uterine segment  - PCP vs Gyn follow up as outpt.    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  # Ascending thoracic aortic aneurysm measuring up to 5.6 cm  - outpt vascular surgery follow up

## 2023-08-08 NOTE — DISCHARGE NOTE NURSING/CASE MANAGEMENT/SOCIAL WORK - NSDCPEFALRISK_GEN_ALL_CORE
For information on Fall & Injury Prevention, visit: [email or web address] OR  [email or web address] OR  https://www.cdc.gov/steadi/patient.html

## 2023-08-08 NOTE — DISCHARGE NOTE PROVIDER - NSDCCPCAREPLAN_GEN_ALL_CORE_FT
PRINCIPAL DISCHARGE DIAGNOSIS  Diagnosis: BERKLEY (acute kidney injury)  Assessment and Plan of Treatment: BERKLEY likely from dehydration.  You were noted to have a temporary insult to your kidney function either at the time that you arrived at the hospital or during your stay here. We have monitored your kidney function with blood work during your time here and you are at a level that no longer requires continued hospital level care, but we do recommend that you follow up to continually have your kidney function checked. You can follow up with your primary care doctor, or, if recommended in the discharge paperwork, you should follow up with your primary doctor in 1-2 weeks after discharge.  Please keep yourself well hydrated. if you notice persistent nausea / vomiting or change in mental status please seek immediate help.      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  Please follow up with vascular surgery and your PCP as outpt for further evaluation.    Diagnosis: Uterine fluid accumulation  Assessment and Plan of Treatment: you were found to have 4 cm wide fluid-filled structure within lower uterine segment. Please follow up with PCP as outpt. Can consider Gyn eval.

## 2023-08-08 NOTE — DISCHARGE NOTE PROVIDER - NSDCMRMEDTOKEN_GEN_ALL_CORE_FT
pantoprazole 40 mg oral delayed release tablet: 1 tab(s) orally once a day (before a meal)   dimenhyDRINATE 50 mg oral tablet: 1 tab(s) orally 2 times a day x 2 days as needed for  nausea  pantoprazole 40 mg oral delayed release tablet: 1 tab(s) orally once a day (before a meal)

## 2023-08-08 NOTE — DISCHARGE NOTE PROVIDER - CARE PROVIDERS DIRECT ADDRESSES
,[email or web address]_Unknown

## 2023-08-08 NOTE — PHYSICAL THERAPY INITIAL EVALUATION ADULT - ADDITIONAL COMMENTS
pt is 89 y/o F  , lives alone  in  , information obtain from the pt herself , has 3 steps to enter with RT  HR and  all in one level inside the house  , pt was independent using RW/ Rollator with bed mobility , transfer , ambulation ,stair negotiation and basic ADLS.  Pt has HHA for 7 days / week

## 2023-08-08 NOTE — DISCHARGE NOTE NURSING/CASE MANAGEMENT/SOCIAL WORK - PATIENT PORTAL LINK FT
You can access the FollowMyHealth Patient Portal offered by [facility] by registering at the following website: [email or web address]. By joining [employer]’s FollowMyHealth portal, you will also be able to view your health information using other applications (apps) compatible with our system.

## 2023-08-08 NOTE — PHYSICAL THERAPY INITIAL EVALUATION ADULT - PERTINENT HX OF CURRENT PROBLEM, REHAB EVAL
89yo female, recently discharged from a rehab facility where she did have treatment for a UTI, is sent to the ER due to poor oral intake. No abdominal pain or fevers noted but there was nausea. Patient does say she is normally a poor eater unless the food is good. Adds that she currently feels well. ER workup revealed BERKLEY

## 2023-08-08 NOTE — PHYSICAL THERAPY INITIAL EVALUATION ADULT - PLANNED THERAPY INTERVENTIONS, PT EVAL
Detail Level: Detailed
balance training/bed mobility training/gait training/strengthening/transfer training

## 2023-08-14 DIAGNOSIS — I71.21 ANEURYSM OF THE ASCENDING AORTA, WITHOUT RUPTURE: ICD-10-CM

## 2023-08-14 DIAGNOSIS — I71.43 INFRARENAL ABDOMINAL AORTIC ANEURYSM, WITHOUT RUPTURE: ICD-10-CM

## 2023-08-14 DIAGNOSIS — N17.9 ACUTE KIDNEY FAILURE, UNSPECIFIED: ICD-10-CM

## 2023-08-14 DIAGNOSIS — N85.8 OTHER SPECIFIED NONINFLAMMATORY DISORDERS OF UTERUS: ICD-10-CM

## 2023-08-14 DIAGNOSIS — Z91.81 HISTORY OF FALLING: ICD-10-CM

## 2023-10-13 NOTE — ED PROVIDER NOTE - CLINICAL SUMMARY MEDICAL DECISION MAKING FREE TEXT BOX
Name band; 89-year-old female presents evaluation of left hip pain and inability ambulate.  Patient reports she had a fall 4 days ago landing her left hip.  At that time pan scan showed no acute process patient was discharged.  Patient went however since going home has worsening pain now unable to ambulate.  Here CT pelvis shows no acute hip fracture and left lower extremity x-rays with no fracture as well.  Patient admitted for inability ambulate.

## 2023-11-24 NOTE — ED PROVIDER NOTE - NS ED MD DISPO ISOLATION TYPES
"  Assessment & Plan   (B35.0) Tinea capitis  (primary encounter diagnosis)  Comment: Exam findings consistent with tinea capitis, see physical exam for images.  Unable to obtain griseofulvin previously as it was not covered by insurance.  Has not improved with mupirocin or triamcinolone ointment.  In discussion with patient's parents during the encounter, they desired a liquid suspension for treatment.  Given that this amount of fluconazole is quite expensive for them and would require them to return the pharmacy multiple times given the shelf life of oral suspension of fluconazole, discussed with pharmacist over the phone that they can cut terbinafine 250 mg tablet into quarters for desired dose of 62.5 mg daily for the next 6 weeks for the treatment of tinea capitis.  Additionally discussed prevention of transmission including not sharing hats, washing pillow sheets and furniture that were exposed, and treating older brother who also has tinea capitis.  Plan: terbinafine (LAMISIL) 250 MG tablet,         DISCONTINUED: fluconazole (DIFLUCAN) 10 MG/ML         suspension       RTC in 6 weeks to evaluate treatment of tinea capitis.      DO Ilene Santana Philip is a 4 year old, presenting for the following health issues:  Light/dark Spots (Check spot on the head, patient cannot go school due to it and need to sliding scale specialty )      11/24/2023     2:17 PM   Additional Questions   Roomed by pretty   Accompanied by grand dad       HPI   Patient is a 4-year-old male who presents with his grandfather for the evaluation of a \"bald spot\" on his head.    They were previously seen for this in clinic on 11/9/2023 and were prescribed you mupirocin ointment and triamcinolone cream.  They have been using both of these topical remedies twice daily since that visit, but have not found any improvement in the lesion.  It appears that they were sent a prescription for griseofulvin for systemic antifungal " "therapy, however they were unable to fill this as it was not covered by insurance.    The lesion is localized to the superior portion of the left occiput, and is nontender and nonpruritic.  Grandfather thinks that it has been increasing in size over the past 4 months, and he feels like it appeared overnight.  His older brother has had it for approximately 5 to 6 months as well.  No fevers or chills, no headache, no neck pain, and no ulceration or drainage from the lesion.  Nobody else at school has something similar to this.    They are presenting today because his schools informed him that they are not allowing him to return given the size of the lesion.      Review of Systems   Constitutional, eye, ENT, skin, respiratory, cardiac, and GI are normal except as otherwise noted.      Objective    /75   Pulse 105   Temp 98.3  F (36.8  C) (Tympanic)   Resp 24   Ht 1.09 m (3' 6.91\")   Wt 18.4 kg (40 lb 9.6 oz)   SpO2 99%   BMI 15.50 kg/m    62 %ile (Z= 0.29) based on CDC (Boys, 2-20 Years) weight-for-age data using vitals from 11/24/2023.     Physical Exam   GENERAL: Active, alert, in no acute distress.  SKIN: Approximately 4 cm hyperkeratotic lesion to the superior aspect of the left occiput.  See image below.  NOSE: Normal without discharge.  MOUTH/THROAT: Clear. No oral lesions. Teeth intact without obvious abnormalities.  NECK: Supple, no masses.  LYMPH NODES: No adenopathy  LUNGS: Clear. No rales, rhonchi, wheezing or retractions  HEART: Regular rhythm. Normal S1/S2. No murmurs.  ABDOMEN: Soft, non-tender, not distended, no masses or hepatosplenomegaly. Bowel sounds normal.               " None

## 2024-04-04 ENCOUNTER — INPATIENT (INPATIENT)
Facility: HOSPITAL | Age: 89
LOS: 0 days | Discharge: HOME | DRG: 914 | End: 2024-04-05
Attending: HOSPITALIST | Admitting: FAMILY MEDICINE
Payer: MEDICARE

## 2024-04-04 VITALS
WEIGHT: 179.9 LBS | RESPIRATION RATE: 18 BRPM | HEART RATE: 110 BPM | TEMPERATURE: 98 F | DIASTOLIC BLOOD PRESSURE: 79 MMHG | SYSTOLIC BLOOD PRESSURE: 149 MMHG | OXYGEN SATURATION: 99 %

## 2024-04-04 DIAGNOSIS — Z90.49 ACQUIRED ABSENCE OF OTHER SPECIFIED PARTS OF DIGESTIVE TRACT: Chronic | ICD-10-CM

## 2024-04-04 DIAGNOSIS — Z78.9 OTHER SPECIFIED HEALTH STATUS: Chronic | ICD-10-CM

## 2024-04-04 PROCEDURE — 72125 CT NECK SPINE W/O DYE: CPT | Mod: 26,MC

## 2024-04-04 PROCEDURE — 70450 CT HEAD/BRAIN W/O DYE: CPT | Mod: 26,MC

## 2024-04-04 PROCEDURE — 99285 EMERGENCY DEPT VISIT HI MDM: CPT

## 2024-04-04 NOTE — ED PROVIDER NOTE - IV ALTEPLASE EXCL ABS HIDDEN
Message  Rec's fax request from [facility], requesting approval to hold pradaxa for 2 days prior to EGD, and pacemaker device check was sent as they also requested pacer info, and OK to place magnet during procedure. Dr Loya reviewed and approved pt to hold pradaxa 2 days prior.  Faxed form back to        Signatures   Electronically signed by : Trinh Gilbert R.N.; Feb 2 2017  2:48PM CST     show

## 2024-04-04 NOTE — ED PROVIDER NOTE - OBJECTIVE STATEMENT
89-year-old female with PMH COPD presenting to ED for evaluation of mechanical trip and fall.  Reports she was using her walker tripped fell forward and hit the right side of her head.  No LOC, not on anticoagulation.  Patient denying pain to any other parts of her body.  Denying any dizziness, headache, vision change, numbness/tingling, weakness, chest pain, abdominal pain

## 2024-04-04 NOTE — ED PROVIDER NOTE - ATTENDING APP SHARED VISIT CONTRIBUTION OF CARE
89yF p/w fall - got out of bed tonight to go to the bathroom when she is not supposed to walk on her own and fell.  +pain/abrasion to R forehead.  Not on a/c.

## 2024-04-04 NOTE — ED ADULT NURSE NOTE - NSICDXPASTMEDICALHX_GEN_ALL_CORE_FT
PAST MEDICAL HISTORY:  Chronic obstructive pulmonary disease albuterol neb  o2 nc    Diverticulosis as per hx    Hiatal hernia as per hx    Medical history unknown     Osteoarthritis as per hx    PUD (peptic ulcer disease) protonix

## 2024-04-04 NOTE — ED ADULT NURSE NOTE - NSFALLHARMRISKINTERV_ED_ALL_ED
Assistance OOB with selected safe patient handling equipment if applicable/Communicate risk of Fall with Harm to all staff, patient, and family/Monitor gait and stability/Provide patient with walking aids/Provide visual cue: red socks, yellow wristband, yellow gown, etc/Reinforce activity limits and safety measures with patient and family/Bed in lowest position, wheels locked, appropriate side rails in place/Call bell, personal items and telephone in reach/Instruct patient to call for assistance before getting out of bed/chair/stretcher/Non-slip footwear applied when patient is off stretcher/[place] to call system/Physically safe environment - no spills, clutter or unnecessary equipment/Purposeful Proactive Rounding/Room/bathroom lighting operational, light cord in reach

## 2024-04-04 NOTE — ED PROVIDER NOTE - PHYSICAL EXAMINATION
GENERAL: Well-nourished, Well-developed. NAD.  HEAD: (+)small bump to R forehead. No ecchymosis behind ears B/L.  Eyes: PERRLA, EOMI.   Neck: Supple. No cervical midline TTP. No paravertebral TTP to traps. FROM  CVS: No reproducible chest wall tenderness. Normal S1,S2. No murmurs appreciated on auscultation   RESP: No use of accessory muscles. Chest rise symmetrical with good expansion. Lungs clear to auscultation B/L. No wheezing, rales, or rhonchi auscultated.  GI: Normal auscultation of bowel sounds in all 4 quadrants. Soft, Nontender, Nondistended. No guarding or rebound tenderness. No CVAT B/L.  MSK: Extremities w/o deformity or ttp. No visible signs of trauma such as ecchymosis, erythema, or swelling. FROM of upper and lower extremities B/L. No midline spinal TTP. FROM of back with flexion and extension.  Skin: Warm, Dry. No rashes or lesions. Good cap refill < 2 sec B/L.  Neuro: AA&O x 3. Sensation grossly intact. Strength 5/5 B/L. Gait within normal limits.

## 2024-04-04 NOTE — ED PROVIDER NOTE - PATIENT PORTAL LINK FT
You can access the FollowMyHealth Patient Portal offered by [facility] by registering at the following website: [email or web address]. By joining [employer]’s FollowMyHealth portal, you will also be able to view your health information using other applications (apps) compatible with our system.

## 2024-04-04 NOTE — ED PROVIDER NOTE - CLINICAL SUMMARY MEDICAL DECISION MAKING FREE TEXT BOX
Case signed out to me by Dr. Klerman -- patient presented sp mechanical trip and fall at home with obvious head trauma. Imaging was negative and plan per Dr. Klerman and family was dc home however while in ED had persistent pain to head and multiple episodes of NBNB vomiting, requiring antiemitic.    Given age, sx consistent with concussion, will admit for monitoring. If sx persist or MS change may need repeat imaging. Currently has non focal neuro exam and normal MS.

## 2024-04-04 NOTE — ED ADULT NURSE NOTE - NSICDXPASTSURGICALHX_GEN_ALL_CORE_FT
PAST SURGICAL HISTORY:  History of colon resection     S/P laparoscopic-assisted sigmoidectomy as per hx    Surgical history unknown

## 2024-04-05 ENCOUNTER — TRANSCRIPTION ENCOUNTER (OUTPATIENT)
Age: 89
End: 2024-04-05

## 2024-04-05 VITALS
SYSTOLIC BLOOD PRESSURE: 126 MMHG | OXYGEN SATURATION: 96 % | TEMPERATURE: 95 F | HEART RATE: 85 BPM | RESPIRATION RATE: 18 BRPM | DIASTOLIC BLOOD PRESSURE: 64 MMHG

## 2024-04-05 DIAGNOSIS — S09.90XA UNSPECIFIED INJURY OF HEAD, INITIAL ENCOUNTER: ICD-10-CM

## 2024-04-05 PROBLEM — K44.9 DIAPHRAGMATIC HERNIA WITHOUT OBSTRUCTION OR GANGRENE: Chronic | Status: ACTIVE | Noted: 2018-06-30

## 2024-04-05 PROBLEM — M19.90 UNSPECIFIED OSTEOARTHRITIS, UNSPECIFIED SITE: Chronic | Status: ACTIVE | Noted: 2018-06-30

## 2024-04-05 PROBLEM — K27.9 PEPTIC ULCER, SITE UNSPECIFIED, UNSPECIFIED AS ACUTE OR CHRONIC, WITHOUT HEMORRHAGE OR PERFORATION: Chronic | Status: ACTIVE | Noted: 2018-06-29

## 2024-04-05 PROBLEM — J44.9 CHRONIC OBSTRUCTIVE PULMONARY DISEASE, UNSPECIFIED: Chronic | Status: ACTIVE | Noted: 2018-06-29

## 2024-04-05 PROBLEM — Z78.9 OTHER SPECIFIED HEALTH STATUS: Chronic | Status: ACTIVE | Noted: 2023-08-07

## 2024-04-05 PROBLEM — K57.90 DIVERTICULOSIS OF INTESTINE, PART UNSPECIFIED, WITHOUT PERFORATION OR ABSCESS WITHOUT BLEEDING: Chronic | Status: ACTIVE | Noted: 2018-06-30

## 2024-04-05 LAB
ALBUMIN SERPL ELPH-MCNC: 4.4 G/DL — SIGNIFICANT CHANGE UP (ref 3.5–5.2)
ALP SERPL-CCNC: 72 U/L — SIGNIFICANT CHANGE UP (ref 30–115)
ALT FLD-CCNC: 6 U/L — SIGNIFICANT CHANGE UP (ref 0–41)
AMORPH PHOS CRY # URNS: PRESENT
AMORPH SED URNS QL MICRO: PRESENT
ANION GAP SERPL CALC-SCNC: 13 MMOL/L — SIGNIFICANT CHANGE UP (ref 7–14)
ANION GAP SERPL CALC-SCNC: 14 MMOL/L — SIGNIFICANT CHANGE UP (ref 7–14)
APPEARANCE UR: CLEAR — SIGNIFICANT CHANGE UP
AST SERPL-CCNC: 15 U/L — SIGNIFICANT CHANGE UP (ref 0–41)
BACTERIA # UR AUTO: ABNORMAL /HPF
BASOPHILS # BLD AUTO: 0.06 K/UL — SIGNIFICANT CHANGE UP (ref 0–0.2)
BASOPHILS NFR BLD AUTO: 0.6 % — SIGNIFICANT CHANGE UP (ref 0–1)
BILIRUB SERPL-MCNC: 0.7 MG/DL — SIGNIFICANT CHANGE UP (ref 0.2–1.2)
BILIRUB UR-MCNC: NEGATIVE — SIGNIFICANT CHANGE UP
BUN SERPL-MCNC: 23 MG/DL — HIGH (ref 10–20)
BUN SERPL-MCNC: 23 MG/DL — HIGH (ref 10–20)
CALCIUM SERPL-MCNC: 9.1 MG/DL — SIGNIFICANT CHANGE UP (ref 8.4–10.5)
CALCIUM SERPL-MCNC: 9.3 MG/DL — SIGNIFICANT CHANGE UP (ref 8.4–10.5)
CHLORIDE SERPL-SCNC: 104 MMOL/L — SIGNIFICANT CHANGE UP (ref 98–110)
CHLORIDE SERPL-SCNC: 105 MMOL/L — SIGNIFICANT CHANGE UP (ref 98–110)
CO2 SERPL-SCNC: 23 MMOL/L — SIGNIFICANT CHANGE UP (ref 17–32)
CO2 SERPL-SCNC: 23 MMOL/L — SIGNIFICANT CHANGE UP (ref 17–32)
COLOR SPEC: YELLOW — SIGNIFICANT CHANGE UP
CREAT SERPL-MCNC: 1.1 MG/DL — SIGNIFICANT CHANGE UP (ref 0.7–1.5)
CREAT SERPL-MCNC: 1.1 MG/DL — SIGNIFICANT CHANGE UP (ref 0.7–1.5)
DIFF PNL FLD: ABNORMAL
EGFR: 46 ML/MIN/1.73M2 — LOW
EGFR: 46 ML/MIN/1.73M2 — LOW
EOSINOPHIL # BLD AUTO: 0.15 K/UL — SIGNIFICANT CHANGE UP (ref 0–0.7)
EOSINOPHIL NFR BLD AUTO: 1.5 % — SIGNIFICANT CHANGE UP (ref 0–8)
EPI CELLS # UR: PRESENT
GLUCOSE SERPL-MCNC: 125 MG/DL — HIGH (ref 70–99)
GLUCOSE SERPL-MCNC: 164 MG/DL — HIGH (ref 70–99)
GLUCOSE UR QL: 100 MG/DL
HCT VFR BLD CALC: 36.6 % — LOW (ref 37–47)
HCT VFR BLD CALC: 39.7 % — SIGNIFICANT CHANGE UP (ref 37–47)
HGB BLD-MCNC: 12.2 G/DL — SIGNIFICANT CHANGE UP (ref 12–16)
HGB BLD-MCNC: 13.1 G/DL — SIGNIFICANT CHANGE UP (ref 12–16)
IMM GRANULOCYTES NFR BLD AUTO: 0.3 % — SIGNIFICANT CHANGE UP (ref 0.1–0.3)
KETONES UR-MCNC: NEGATIVE MG/DL — SIGNIFICANT CHANGE UP
LEUKOCYTE ESTERASE UR-ACNC: NEGATIVE — SIGNIFICANT CHANGE UP
LIDOCAIN IGE QN: 29 U/L — SIGNIFICANT CHANGE UP (ref 7–60)
LYMPHOCYTES # BLD AUTO: 1.57 K/UL — SIGNIFICANT CHANGE UP (ref 1.2–3.4)
LYMPHOCYTES # BLD AUTO: 16.2 % — LOW (ref 20.5–51.1)
MCHC RBC-ENTMCNC: 29.5 PG — SIGNIFICANT CHANGE UP (ref 27–31)
MCHC RBC-ENTMCNC: 29.9 PG — SIGNIFICANT CHANGE UP (ref 27–31)
MCHC RBC-ENTMCNC: 33 G/DL — SIGNIFICANT CHANGE UP (ref 32–37)
MCHC RBC-ENTMCNC: 33.3 G/DL — SIGNIFICANT CHANGE UP (ref 32–37)
MCV RBC AUTO: 88.4 FL — SIGNIFICANT CHANGE UP (ref 81–99)
MCV RBC AUTO: 90.6 FL — SIGNIFICANT CHANGE UP (ref 81–99)
MONOCYTES # BLD AUTO: 0.51 K/UL — SIGNIFICANT CHANGE UP (ref 0.1–0.6)
MONOCYTES NFR BLD AUTO: 5.3 % — SIGNIFICANT CHANGE UP (ref 1.7–9.3)
NEUTROPHILS # BLD AUTO: 7.36 K/UL — HIGH (ref 1.4–6.5)
NEUTROPHILS NFR BLD AUTO: 76.1 % — HIGH (ref 42.2–75.2)
NITRITE UR-MCNC: NEGATIVE — SIGNIFICANT CHANGE UP
NRBC # BLD: 0 /100 WBCS — SIGNIFICANT CHANGE UP (ref 0–0)
NRBC # BLD: 0 /100 WBCS — SIGNIFICANT CHANGE UP (ref 0–0)
PH UR: 8 — SIGNIFICANT CHANGE UP (ref 5–8)
PLATELET # BLD AUTO: 233 K/UL — SIGNIFICANT CHANGE UP (ref 130–400)
PLATELET # BLD AUTO: 254 K/UL — SIGNIFICANT CHANGE UP (ref 130–400)
PMV BLD: 8.9 FL — SIGNIFICANT CHANGE UP (ref 7.4–10.4)
PMV BLD: 9 FL — SIGNIFICANT CHANGE UP (ref 7.4–10.4)
POTASSIUM SERPL-MCNC: 4 MMOL/L — SIGNIFICANT CHANGE UP (ref 3.5–5)
POTASSIUM SERPL-MCNC: 4.5 MMOL/L — SIGNIFICANT CHANGE UP (ref 3.5–5)
POTASSIUM SERPL-SCNC: 4 MMOL/L — SIGNIFICANT CHANGE UP (ref 3.5–5)
POTASSIUM SERPL-SCNC: 4.5 MMOL/L — SIGNIFICANT CHANGE UP (ref 3.5–5)
PROT SERPL-MCNC: 7.3 G/DL — SIGNIFICANT CHANGE UP (ref 6–8)
PROT UR-MCNC: 30 MG/DL
RBC # BLD: 4.14 M/UL — LOW (ref 4.2–5.4)
RBC # BLD: 4.38 M/UL — SIGNIFICANT CHANGE UP (ref 4.2–5.4)
RBC # FLD: 15.5 % — HIGH (ref 11.5–14.5)
RBC # FLD: 15.9 % — HIGH (ref 11.5–14.5)
RBC CASTS # UR COMP ASSIST: 3 /HPF — SIGNIFICANT CHANGE UP (ref 0–4)
SODIUM SERPL-SCNC: 141 MMOL/L — SIGNIFICANT CHANGE UP (ref 135–146)
SODIUM SERPL-SCNC: 141 MMOL/L — SIGNIFICANT CHANGE UP (ref 135–146)
SP GR SPEC: 1.01 — SIGNIFICANT CHANGE UP (ref 1–1.03)
UROBILINOGEN FLD QL: 0.2 MG/DL — SIGNIFICANT CHANGE UP (ref 0.2–1)
WBC # BLD: 7.07 K/UL — SIGNIFICANT CHANGE UP (ref 4.8–10.8)
WBC # BLD: 9.68 K/UL — SIGNIFICANT CHANGE UP (ref 4.8–10.8)
WBC # FLD AUTO: 7.07 K/UL — SIGNIFICANT CHANGE UP (ref 4.8–10.8)
WBC # FLD AUTO: 9.68 K/UL — SIGNIFICANT CHANGE UP (ref 4.8–10.8)
WBC UR QL: 2 /HPF — SIGNIFICANT CHANGE UP (ref 0–5)

## 2024-04-05 PROCEDURE — 70450 CT HEAD/BRAIN W/O DYE: CPT | Mod: MC

## 2024-04-05 PROCEDURE — 99222 1ST HOSP IP/OBS MODERATE 55: CPT

## 2024-04-05 PROCEDURE — 74176 CT ABD & PELVIS W/O CONTRAST: CPT | Mod: 26

## 2024-04-05 PROCEDURE — 74176 CT ABD & PELVIS W/O CONTRAST: CPT | Mod: MC

## 2024-04-05 PROCEDURE — 80048 BASIC METABOLIC PNL TOTAL CA: CPT

## 2024-04-05 PROCEDURE — 87077 CULTURE AEROBIC IDENTIFY: CPT

## 2024-04-05 PROCEDURE — 97162 PT EVAL MOD COMPLEX 30 MIN: CPT | Mod: GP

## 2024-04-05 PROCEDURE — 85027 COMPLETE CBC AUTOMATED: CPT

## 2024-04-05 PROCEDURE — 81001 URINALYSIS AUTO W/SCOPE: CPT

## 2024-04-05 PROCEDURE — 87086 URINE CULTURE/COLONY COUNT: CPT

## 2024-04-05 PROCEDURE — 70450 CT HEAD/BRAIN W/O DYE: CPT | Mod: 26

## 2024-04-05 RX ORDER — PANTOPRAZOLE SODIUM 20 MG/1
40 TABLET, DELAYED RELEASE ORAL DAILY
Refills: 0 | Status: DISCONTINUED | OUTPATIENT
Start: 2024-04-05 | End: 2024-04-05

## 2024-04-05 RX ORDER — HYDRALAZINE HCL 50 MG
10 TABLET ORAL EVERY 6 HOURS
Refills: 0 | Status: DISCONTINUED | OUTPATIENT
Start: 2024-04-05 | End: 2024-04-05

## 2024-04-05 RX ORDER — LANOLIN ALCOHOL/MO/W.PET/CERES
3 CREAM (GRAM) TOPICAL AT BEDTIME
Refills: 0 | Status: DISCONTINUED | OUTPATIENT
Start: 2024-04-05 | End: 2024-04-05

## 2024-04-05 RX ORDER — ONDANSETRON 8 MG/1
4 TABLET, FILM COATED ORAL EVERY 4 HOURS
Refills: 0 | Status: DISCONTINUED | OUTPATIENT
Start: 2024-04-05 | End: 2024-04-05

## 2024-04-05 RX ORDER — ONDANSETRON 8 MG/1
8 TABLET, FILM COATED ORAL ONCE
Refills: 0 | Status: COMPLETED | OUTPATIENT
Start: 2024-04-05 | End: 2024-04-05

## 2024-04-05 RX ORDER — METOCLOPRAMIDE HCL 10 MG
10 TABLET ORAL ONCE
Refills: 0 | Status: DISCONTINUED | OUTPATIENT
Start: 2024-04-05 | End: 2024-04-05

## 2024-04-05 RX ORDER — PANTOPRAZOLE SODIUM 20 MG/1
40 TABLET, DELAYED RELEASE ORAL
Refills: 0 | Status: DISCONTINUED | OUTPATIENT
Start: 2024-04-05 | End: 2024-04-05

## 2024-04-05 RX ORDER — ACETAMINOPHEN 500 MG
650 TABLET ORAL EVERY 6 HOURS
Refills: 0 | Status: DISCONTINUED | OUTPATIENT
Start: 2024-04-05 | End: 2024-04-05

## 2024-04-05 RX ADMIN — ONDANSETRON 8 MILLIGRAM(S): 8 TABLET, FILM COATED ORAL at 01:15

## 2024-04-05 NOTE — PHYSICAL THERAPY INITIAL EVALUATION ADULT - ADDITIONAL COMMENTS
Pt reports she lives alone and has 3 AMANDA then level living. Pt has HHA x 1 hrs/day. Pt amb with RW at baseline, PTA.

## 2024-04-05 NOTE — H&P ADULT - ASSESSMENT
6-year-old female c/o fell going to the bathroom. Pt with a mechanical trip and fall.  Pt with PMH COPD.  Reports she was using her walker tripped fell forward and hit the right side of her head.  No LOC, not on anticoagulation.  Patient denying pain to any other parts of her body.  Pt denies cp., sob , N/V  ,dizziness, headache, vision change, numbness/tingling, weakness, abdominal pain      DX head trauma: fPt fell down head first:  Head ct grossly negative:  No LOC  Physical  therapy   89year-old female c/o fell going to the bathroom. Pt with a mechanical trip and fall.  Pt with PMH COPD.  Reports she was using her walker tripped fell forward and hit the right side of her head.  Pt added that she feels nausea and vomited a little. No LOC, not on anticoagulation.  Patient denying pain to any other parts of her body.  Pt denies cp., sob  ,dizziness, headache, vision change, numbness/tingling, weakness, abdominal p    DX head trauma: fPt fell down head first:  Head ct grossly negative:  No LOC  Physical  therapy  prn  nausea / vomiting  IV zofran     past medical HX     GErd :  protonix 40mg po QD   no other medications or anticoag as per patient    prophylaxis GI/VTE    Case D/W Dr Arango Patient is 89year-old female  with PMH COPD presenting with     #Mechanical fall  #Head trauma:   -Head ct grossly negative:  -No LOC  -Physical  therapy  -prn  nausea / vomiting  IV zofran   -Neuro checks     #Gerd :  protonix 40mg po QD     #Elevated BP  -Hydralazine PRN  -Monitor BP     prophylaxis GI/VTE    Case D/W Dr Arango    #Progress Note Handoff  Pending (specify):  as above   Family discussion:  plan of care was discussed with patient   in details.  all questions were answered.  seems to understand, and in agreement  Disposition:  PT

## 2024-04-05 NOTE — H&P ADULT - NSHPPHYSICALEXAM_GEN_ALL_CORE
GENERAL:  89y/o Female NAD, resting comfortably.  HEAD:  small bump to Right forehead. No ecchymosis behind ears B/ L  EYES: EOMI, PERRLA, conjunctiva and sclera clear  NECK: Supple, No JVD, no cervical lymphadenopathy, non-tender  CHEST/LUNG: Clear to auscultation bilaterally; No wheeze, rhonchi, or rales  HEART: Regular rate and rhythm; S1&S2  ABDOMEN: Soft, Nontender, Nondistended x 4 quadrants; Bowel sounds present  EXTREMITIES:   Peripheral Pulses Present, No clubbing, no cyanosis, or no edema, no calf tenderness  PSYCH: AAOx3, cooperative, appropriate  NEUROLOGY: WNL  SKIN: WNL GENERAL:  89y/o Female NAD, resting comfortably.  HEAD:  small bump to Right forehead. Large  right black eye and eyebrow swelling  No ecchymosis behind ears B/ L  EYES: EOMI, PERRLA, conjunctiva and sclera clear  NECK: Supple, No JVD, no cervical lymphadenopathy, non-tender  CHEST/LUNG: Clear to auscultation bilaterally; No wheeze, rhonchi, or rales  HEART: Regular rate and rhythm; S1&S2  ABDOMEN: Soft, Nontender, Nondistended x 4 quadrants; Bowel sounds present  EXTREMITIES:   Peripheral Pulses Present, No clubbing, no cyanosis, or no edema, no calf tenderness  PSYCH: AAOx3, cooperative, appropriate  NEUROLOGY: WNL  SKIN: facial bruising right side forehead and eye echymosis. soft tissue facial 2cm hematoma larger than on ct (delayed reaction) Vital Signs Last 24 Hrs  T(C): 36.1 (05 Apr 2024 03:44), Max: 36.7 (04 Apr 2024 22:24)  T(F): 97 (05 Apr 2024 03:44), Max: 98 (04 Apr 2024 22:24)  HR: 88 (05 Apr 2024 03:44) (82 - 110)  BP: 178/88 (05 Apr 2024 03:44) (149/79 - 217/117)  BP(mean): --  RR: 18 (05 Apr 2024 03:44) (18 - 18)  SpO2: 97% (05 Apr 2024 03:44) (97% - 99%)    Parameters below as of 05 Apr 2024 03:44  Patient On (Oxygen Delivery Method): nasal cannula  O2 Flow (L/min): 3      GENERAL:  89y/o Female NAD, resting comfortably.  HEAD:  small bump to Right forehead. Large  right black eye and eyebrow swelling  No ecchymosis behind ears B/ L  EYES: EOMI, PERRLA, conjunctiva and sclera clear  NECK: Supple, No JVD, no cervical lymphadenopathy, non-tender  CHEST/LUNG: Clear to auscultation bilaterally; No wheeze, rhonchi, or rales  HEART: Regular rate and rhythm; S1&S2  ABDOMEN: Soft, Nontender, Nondistended x 4 quadrants; Bowel sounds present  EXTREMITIES:   Peripheral Pulses Present, No clubbing, no cyanosis, or no edema, no calf tenderness  PSYCH: AAOx3, cooperative, appropriate  NEUROLOGY: WNL  SKIN: facial bruising right side forehead and eye echymosis. soft tissue facial 2cm hematoma larger than on ct (delayed reaction)

## 2024-04-05 NOTE — PATIENT PROFILE ADULT - FALL HARM RISK - HARM RISK INTERVENTIONS
Assistance with ambulation/Assistance OOB with selected safe patient handling equipment/Communicate Risk of Fall with Harm to all staff/Discuss with provider need for PT consult/Monitor gait and stability/Provide patient with walking aids - walker, cane, crutches/Reinforce activity limits and safety measures with patient and family/Sit up slowly, dangle for a short time, stand at bedside before walking/Tailored Fall Risk Interventions/Visual Cue: Yellow wristband and red socks/Bed in lowest position, wheels locked, appropriate side rails in place/Call bell, personal items and telephone in reach/Instruct patient to call for assistance before getting out of bed or chair/Non-slip footwear when patient is out of bed/[place] to call system/Physically safe environment - no spills, clutter or unnecessary equipment/Purposeful Proactive Rounding/Room/bathroom lighting operational, light cord in reach

## 2024-04-05 NOTE — DISCHARGE NOTE PROVIDER - CARE PROVIDER_API CALL
Pina Tirado  Internal Medicine  [address] [place] [id number]  Phone: [phone]  Fax: [phone]  Follow Up Time: 1 week

## 2024-04-05 NOTE — DISCHARGE NOTE PROVIDER - NSDCCPCAREPLAN_GEN_ALL_CORE_FT
PRINCIPAL DISCHARGE DIAGNOSIS  Diagnosis: Closed head injury  Assessment and Plan of Treatment: You presented with a fall. You have a small bump to right forehead, large  right black eye and eyebrow swelling. You had two CT scans of your head which showed no bleeding or acute findings. Take fall precautions and walk slowly to prevent falls.

## 2024-04-05 NOTE — H&P ADULT - HISTORY OF PRESENT ILLNESS
89-year-old female c/o fell going to the bathroom. Pt with a mechanical trip and fall.  Pt with PMH COPD.  Reports she was using her walker tripped fell forward and hit the right side of her head.  No LOC, not on anticoagulation.  Patient denying pain to any other parts of her body.  Pt denies cp., sob , N/V  ,dizziness, headache, vision change, numbness/tingling, weakness, abdominal pain 89-year-old female c/o fell going to the bathroom. Pt with a mechanical trip and fall.  Pt with PMH COPD.  Reports she was using her walker tripped fell forward and hit the right side of her head.  Pt added that she feels nausea and vomited a little. No LOC, not on anticoagulation.  Patient denying pain to any other parts of her body.  Pt denies cp., sob  ,dizziness, headache, vision change, numbness/tingling, weakness, abdominal pain

## 2024-04-05 NOTE — PHYSICAL THERAPY INITIAL EVALUATION ADULT - PERTINENT HX OF CURRENT PROBLEM, REHAB EVAL
Reason for Admission: fall  History of Present Illness:   89-year-old female c/o fell going to the bathroom. Pt with a mechanical trip and fall.  Pt with PMH COPD.  Reports she was using her walker tripped fell forward and hit the right side of her head.  Pt added that she feels nausea and vomited a little. No LOC, not on anticoagulation.  Patient denying pain to any other parts of her body.  Pt denies cp., sob  ,dizziness, headache, vision change, numbness/tingling, weakness, abdominal pain

## 2024-04-05 NOTE — H&P ADULT - NSHPLABSRESULTS_GEN_ALL_CORE
Right frontal extracalvarial soft tissue hematoma, measuring up to 0.7 cm   AP    There is stable moderate prominence of the ventricles and sulci   consistent with moderate age related parenchymal volume loss.    There are confluent hypodensities in the bilateral periventricular   cerebral white matter consistent with moderate chronic microvascular   ischemic changes.    There is no evidence of intracranial hemorrhage. There is no mass effect   or midline shift.    There is no evidence of hydrocephalus. There are no extra-axial fluid   collections.    The patient is post bilateral cataractsurgery. There is polypoid   opacification of the right maxillary sinus likely reflecting polyp versus   retention cyst. The mastoid air cells are clear. There is a right   periorbital subcutaneous soft tissue hematoma. No depressed skull   fracture.  CT CERVICAL SPINE:    There is no evidence of acute fracture, compression deformity or facet   subluxation of the cervical spine.    The atlantoaxial relationships are maintained. The posterior elements are   intact. There is no significant prevertebral soft tissue swelling. The   visualized paraspinal soft tissues are unremarkable.    Multilevel degenerative change.    There is right apical pleural-parenchymal lung scarring with emphysema.    IMPRESSION:    CT HEAD:  No acute intracranial findings.                          12.2   9.68  )-----------( 254      ( 05 Apr 2024 01:08 )             36.6       04-05    141  |  104  |  23<H>  ----------------------------<  164<H>  4.0   |  23  |  1.1    Ca    9.3      05 Apr 2024 01:08    TPro  7.3  /  Alb  4.4  /  TBili  0.7  /  DBili  x   /  AST  15  /  ALT  6   /  AlkPhos  72  04-05              Urinalysis Basic - ( 05 Apr 2024 01:08 )    Color: x / Appearance: x / SG: x / pH: x  Gluc: 164 mg/dL / Ketone: x  / Bili: x / Urobili: x   Blood: x / Protein: x / Nitrite: x   Leuk Esterase: x / RBC: x / WBC x   Sq Epi: x / Non Sq Epi: x / Bacteria: x        Lactate Trend      CAPILLARY BLOOD GLUCOSE

## 2024-04-05 NOTE — DISCHARGE NOTE PROVIDER - ATTENDING DISCHARGE PHYSICAL EXAMINATION:
Gen: NAD, resting in bed  HEENT:  small bump to Right forehead. Large  right black eye and eyebrow swelling  No ecchymosis behind ears B/ L  CV: Regular rate & regular rhythm  Lungs: CTABL no wheeze  Abdomen: Soft, NTND+ BS present  Ext: Warm, well perfused  Neuro: non focal, awake, CN II-XII intact   Skin: no visible rash, no erythema  Psych: no SI, HI, Hallucination

## 2024-04-05 NOTE — PHYSICAL THERAPY INITIAL EVALUATION ADULT - GENERAL OBSERVATIONS, REHAB EVAL
13:00-13:30 Chart reviewed. Patient available to be seen for physical therapy, denies pain, confirmed with RN.  Pt rec'd in bed +Primafit in NAD.

## 2024-04-05 NOTE — DISCHARGE NOTE PROVIDER - HOSPITAL COURSE
89-year-old female c/o fell going to the bathroom. Pt with a mechanical trip and fall.  Pt with PMH COPD.  Reports she was using her walker tripped fell forward and hit the right side of her head.  Pt added that she feels nausea and vomited a little. No LOC, not on anticoagulation.  Patient denying pain to any other parts of her body.  Pt denies cp., sob, dizziness, headache, vision change, numbness/tingling, weakness, abdominal pain.     On exam he was found to have small bump to Right forehead, large  right black eye and eyebrow swelling  No ecchymosis behind ears B/ L    Initial CT head and c-spine were negative for acute intracranial abnormality or fractures  Repeat CT head the following morning was negative for acute intracranial abnormality   CT a/p was negative for acute findings    Pt worked with PT who recommended home PT.  Pt was discharged to home in stable condition with home care services.

## 2024-04-05 NOTE — DISCHARGE NOTE NURSING/CASE MANAGEMENT/SOCIAL WORK - PATIENT PORTAL LINK FT
You can access the FollowMyHealth Patient Portal offered by [facility] by registering at the following website: [email or web address]. By joining [employer]’s FollowMyHealth portal, you will also be able to view your health information using other applications (apps) compatible with our system.

## 2024-04-07 LAB
CULTURE RESULTS: ABNORMAL
SPECIMEN SOURCE: SIGNIFICANT CHANGE UP

## 2024-04-12 DIAGNOSIS — K21.9 GASTRO-ESOPHAGEAL REFLUX DISEASE WITHOUT ESOPHAGITIS: ICD-10-CM

## 2024-04-12 DIAGNOSIS — Z90.49 ACQUIRED ABSENCE OF OTHER SPECIFIED PARTS OF DIGESTIVE TRACT: ICD-10-CM

## 2024-04-12 DIAGNOSIS — S09.90XA UNSPECIFIED INJURY OF HEAD, INITIAL ENCOUNTER: ICD-10-CM
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
## 2024-04-12 DIAGNOSIS — J44.9 CHRONIC OBSTRUCTIVE PULMONARY DISEASE, UNSPECIFIED: ICD-10-CM

## 2024-06-06 NOTE — PATIENT PROFILE ADULT - FUNCTIONAL ASSESSMENT - BASIC MOBILITY 4.
"1         [facility]  INPATIENT PROGRESS NOTE    Patient Name: Adrianne Rainey  Date of Admission: 6/4/2024  Today's Date: 06/06/24  Length of Stay: 2  Primary Care Physician: Víctor Kern MD    Subjective   Chief Complaint: Follow-up  HPI   Patient admitted yesterday for intractable pain.  Patient has pelvic leiomyosarcoma.  She received \"salvage\" Adriamycin + DTIC  Spoke on admission to Dr. Kern.  Discussed yesterday with Dr. Thompson.  He simulated the patient and was started on radiation treatment on June 5    Patient had In-N-Out catheter and got over 600 mL of urine but despite of this her pain continues (urinary retention on day of admission close (.  It was decided with Dr. Kern to put in a Adair catheter as she gets radiation treatment.  Filgastrim added to patient's regimen on day of admit.  Noted an increase in white count which is expected.        Fentanyl patch likewise added and increase Norco to 7.5 by  yesterday     Patient feels comfortable today.    Nurse informed me patient complaining of heartburn.  Patient also has Linzess from home medication.  Will resume however as I was trying, medication is nonformulary.  Instead we will use Amitiza per hospital policy of substitution.  She told me she has not had any bowel movement for 1 week.  She has a pelvic mass (leiomyosarcoma)    Review of Systems   All pertinent negatives and positives are as above. All other systems have been reviewed and are negative unless otherwise stated.     Objective    Temp:  [97.8 °F (36.6 °C)-98.6 °F (37 °C)] 97.9 °F (36.6 °C)  Heart Rate:  [] 99  Resp:  [16-18] 16  BP: (101-123)/(50-62) 110/57  Physical Exam  Interactive with appropriate affect.  Able to carry out conversation  Patient appears more comfortable as she lays on the bed  No distress  GEN: Awake, alert, interactive, in NAD  HEENT: Atraumatic, PERRLA, EOMI, Anicteric, Trachea " "midline  Lungs: CTAB, no wheezing/rales/rhonchi  Heart: RRR, +S1/s2, no rub  ABD: soft, nt/nd, +BS, no guarding/rebound  Extremities: atraumatic, no cyanosis, no edema  Skin: no rashes or lesions  Neuro: AAOx3, no focal deficits  Psych: normal mood & affect    Results Review:  I have reviewed the labs, radiology results, and diagnostic studies.    Laboratory Data:   Results from last 7 days   Lab Units 06/06/24  0402 06/05/24  0913 06/04/24  1254   WBC 10*3/mm3 3.70 1.23* 0.75*   HEMOGLOBIN g/dL 8.1* 8.3* 8.8*   HEMATOCRIT % 24.5* 24.3* 25.7*   PLATELETS 10*3/mm3 109* 107* 103*        Results from last 7 days   Lab Units 06/06/24  0402 06/05/24  0913 06/04/24  1254 06/03/24  0854   SODIUM mmol/L 136 135* 133* 137   POTASSIUM mmol/L 3.7 4.0 4.0 4.2   CHLORIDE mmol/L 100 99 100 102   CO2 mmol/L 27.0 26.0 26.0 28.0   BUN mg/dL 11 10 13 14   CREATININE mg/dL 0.80 0.70 0.79 0.75   CALCIUM mg/dL 8.7 8.4* 8.7 8.7   BILIRUBIN mg/dL  --   --  0.6 0.6   ALK PHOS U/L  --   --  59 74   ALT (SGPT) U/L  --   --  6 6   AST (SGOT) U/L  --   --  8 9   GLUCOSE mg/dL 108* 115* 108* 102*       Culture Data:   No results found for: \"BLOODCX\", \"URINECX\", \"WOUNDCX\", \"MRSACX\", \"RESPCX\", \"STOOLCX\"    Radiology Data:   Imaging Results (Last 24 Hours)       ** No results found for the last 24 hours. **            I have reviewed the patient's current medications.     Assessment/Plan   Assessment  Active Hospital Problems    Diagnosis     **Intractable pain    Medical Decision Making  Number and Complexity of problems:   Leiomyosarcoma-recently received 7 cycles of Adriamycin +DTIC on May 28  Left nephrectomy status  Persistent pain believed cancer related  Absolute neutropenia believed secondary to therapy without evidence of fever  Concerning finding for developing carcinomatosis as evidenced by nodular thickening within the right paracolic catheter new from previous exam  Left colon resection  Prior cholecystectomy  Phenotypically recovered " cardiomyopathy-?  Effect of cancer treatment  History of low blood pressure when given morphine in the setting of intake of Entresto, Coreg.        Treatment Plan  The patient will be admitted to my service here at [facility].   Neutropenic precaution     Neupogen added yesterday  Follow-up CBC with differential  Follow-up BMP (mild hyponatremia-significance unclear)  Continue on Adair catheter (urinary retention in the setting of pelvic mass with anticipated radiation therapy)  Infuse as needed if hemoglobin less than 7, platelet transfusion for platelet less than 10,000 or bleeding  Continue on Norco and fentanyl patch.  Continue on bowel regimen to avoid constipation induced by opioid    Due to issue of low blood pressure on initial ER visit prior to this admission, I placed the patient on telemetry.  I placed holding parameter on her antiheart failure/antihypertensive medication.      Medications reviewed  carvedilol, 3.125 mg, Oral, BID With Meals  cetirizine, 10 mg, Oral, Daily  fentaNYL, 1 patch, Transdermal, Q72H   And  Check Fentanyl Patch Placement, 1 each, Does not apply, Q12H  enoxaparin, 40 mg, Subcutaneous, Q24H  famotidine, 20 mg, Oral, BID AC  filgrastim (NEUPOGEN) injection, 480 mcg, Subcutaneous, Daily  lubiprostone, 8 mcg, Oral, BID  sacubitril-valsartan, 1 tablet, Oral, BID  senna-docusate sodium, 2 tablet, Oral, BID  sodium chloride, 10 mL, Intravenous, Q12H      Today, June 6.  Intake of [place] yesterday was every 6 hourly (7.5).  Tolerating Norco and states that it is adequately controlling pain with current regimen  Had taken twice since the beginning of the day as well  Patient also on fentanyl transdermal patch (12 mcg)  Continue DVT prophylaxis  Continue plans as per radiation oncologist and oncology service.  White count improving with Neupogen.  Continue bowel regimen  Encouraged to increase activities as tolerated  Monitor blood pressure on multiple antihypertensive  medication      Conditions and Status  Fair     [facility] Data  External documents reviewed: Reviewed epic record  Cardiac tracing (EKG, telemetry) interpretation: Sinus t ; sinus tach 107 on tele  Results for orders placed during the hospital encounter of 02/02/24     Adult Transthoracic Echo Limited W/ Cont if Necessary Per Protocol     Interpretation Summary    Left ventricular systolic function is normal. Calculated left ventricular EF = 67.6% Left ventricular ejection fraction appears to be 66 - 70%.    Left ventricular diastolic function was normal.    Compared to prior echo LVEF has further increased        Radiology interpretation: Reviewed as outlined above  Labs reviewed: Yes  Any tests that were considered but not ordered: None     Decision rules/scores evaluated (example [id number], [place], etc): None     Discussed with: Patient, , nursing staff in the      Care Planning  Shared decision making: Patient and consultants  Code status and discussions: Full code  I confirmed that the patient's advanced care plan is present, code status is documented, and a surrogate decision maker is listed in the patient's medical record.         Disposition  Social Determinants of Health that impact treatment or disposition: None identified at this time  Estimated length of stay is to be determined  Anticipate discharge when appropriate from radio oncologist and oncologist.      The patient's surrogate decision maker is jabari Jones.         Electronically signed by Fabio Laura MD, 06/06/24, 08:34 CDT.     2 = A lot of assistance

## 2024-06-27 NOTE — PROGRESS NOTE ADULT - PROBLEM SELECTOR PLAN 2
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 Pt states she continues to have difficulty with reaching behind her head to style/brush her hair and reach behind her back. Pt feels about 75% back to her normal function with weakness and soreness after activity being her biggest limiting factors.     Plan of Care/Treatment to date:  [x] Therapeutic Exercise    [x] Modalities:  [x] Therapeutic Activity     [] Ultrasound  [] Electrical Stimulation  [] Gait Training      [] Cervical Traction   [] Lumbar Traction  [x] Neuromuscular Re-education  [x] Cold/hotpack [] Iontophoresis  [x] Instruction in HEP      Other:  [x] Manual Therapy       []  Vasopneumatic  [] Aquatic Therapy       []   Dry Needle Therapy                      Objective/Significant Findings At Last Visit/Comments:    Palpation: proximal biceps tendon tenderness     R shoulder A/PROM:  Flex: 131 deg/135 deg  Scaption: 140 deg/144 deg  ER: 52 deg/58 deg; functionally at C2  IR: 62 deg/65 deg; functionally just above PSIS     MMT:  Flex: 4/5  Abd: 5/5  ER: 4+/5  IR: 5/5     Quick DASH: 27% disability      MicroFET  L scaption: 10.3#  R scaption: 6.3#     L ER in neutral: 9#   R ER in neutral: 5.9#      Tactile cueing given for shoulder shrug 
c/w nsaid, tramadol prn

## 2024-09-13 NOTE — PHYSICAL THERAPY INITIAL EVALUATION ADULT - BED MOBILITY LIMITATIONS, REHAB EVAL
decreased ability to use arms for pushing/pulling/decreased ability to use legs for bridging/pushing 2

## 2024-11-01 NOTE — ED ADULT NURSE NOTE - DRUG PRE-SCREENING (DAST -1)
Treatment Goal Met?: Yes
Treatment Goal Explanation (Does Not Render In The Note): Stable for the purposes of categorizing medical decision making is defined by the specific treatment goals for an individual patient. A patient that is not at their treatment goal is not stable, even if the condition has not changed and there is no short- term threat to life or function.
Statement Selected

## 2024-11-28 NOTE — DISCHARGE NOTE PROVIDER - ATTENDING DISCHARGE PHYSICAL EXAM:
chart, labs and imaging reviewed. Physical examination, medication reconciliation and documentation. Discussion with patient and ER nurse. Attending Discharge Physical Examination:

## 2024-12-13 ENCOUNTER — INPATIENT (INPATIENT)
Facility: HOSPITAL | Age: 88
LOS: 2 days | Discharge: HOME HEALTH | DRG: 690 | End: 2024-12-16
Attending: STUDENT IN AN ORGANIZED HEALTH CARE EDUCATION/TRAINING PROGRAM | Admitting: HOSPITALIST
Payer: MEDICARE

## 2024-12-13 VITALS
WEIGHT: 119.93 LBS | RESPIRATION RATE: 18 BRPM | SYSTOLIC BLOOD PRESSURE: 145 MMHG | DIASTOLIC BLOOD PRESSURE: 85 MMHG | TEMPERATURE: 98 F | OXYGEN SATURATION: 97 % | HEART RATE: 110 BPM

## 2024-12-13 DIAGNOSIS — Z78.9 OTHER SPECIFIED HEALTH STATUS: Chronic | ICD-10-CM

## 2024-12-13 DIAGNOSIS — Z90.49 ACQUIRED ABSENCE OF OTHER SPECIFIED PARTS OF DIGESTIVE TRACT: Chronic | ICD-10-CM

## 2024-12-13 DIAGNOSIS — N39.0 URINARY TRACT INFECTION, SITE NOT SPECIFIED: ICD-10-CM

## 2024-12-13 LAB
ALBUMIN SERPL ELPH-MCNC: 4.3 G/DL — SIGNIFICANT CHANGE UP (ref 3.5–5.2)
ALP SERPL-CCNC: 81 U/L — SIGNIFICANT CHANGE UP (ref 30–115)
ALT FLD-CCNC: 8 U/L — SIGNIFICANT CHANGE UP (ref 0–41)
ANION GAP SERPL CALC-SCNC: 11 MMOL/L — SIGNIFICANT CHANGE UP (ref 7–14)
APPEARANCE UR: CLEAR — SIGNIFICANT CHANGE UP
APTT BLD: 31.5 SEC — SIGNIFICANT CHANGE UP (ref 27–39.2)
AST SERPL-CCNC: 22 U/L — SIGNIFICANT CHANGE UP (ref 0–41)
BACTERIA # UR AUTO: ABNORMAL /HPF
BASOPHILS # BLD AUTO: 0.04 K/UL — SIGNIFICANT CHANGE UP (ref 0–0.2)
BASOPHILS NFR BLD AUTO: 0.4 % — SIGNIFICANT CHANGE UP (ref 0–1)
BILIRUB SERPL-MCNC: 0.9 MG/DL — SIGNIFICANT CHANGE UP (ref 0.2–1.2)
BILIRUB UR-MCNC: NEGATIVE — SIGNIFICANT CHANGE UP
BLD GP AB SCN SERPL QL: SIGNIFICANT CHANGE UP
BUN SERPL-MCNC: 22 MG/DL — HIGH (ref 10–20)
CALCIUM SERPL-MCNC: 9.6 MG/DL — SIGNIFICANT CHANGE UP (ref 8.4–10.5)
CHLORIDE SERPL-SCNC: 103 MMOL/L — SIGNIFICANT CHANGE UP (ref 98–110)
CK SERPL-CCNC: 179 U/L — SIGNIFICANT CHANGE UP (ref 0–225)
CO2 SERPL-SCNC: 26 MMOL/L — SIGNIFICANT CHANGE UP (ref 17–32)
COLOR SPEC: YELLOW — SIGNIFICANT CHANGE UP
CREAT SERPL-MCNC: 0.9 MG/DL — SIGNIFICANT CHANGE UP (ref 0.7–1.5)
DIFF PNL FLD: NEGATIVE — SIGNIFICANT CHANGE UP
EGFR: 59 ML/MIN/1.73M2 — LOW
EOSINOPHIL # BLD AUTO: 0 K/UL — SIGNIFICANT CHANGE UP (ref 0–0.7)
EOSINOPHIL NFR BLD AUTO: 0 % — SIGNIFICANT CHANGE UP (ref 0–8)
EPI CELLS # UR: PRESENT
FLUAV AG NPH QL: SIGNIFICANT CHANGE UP
FLUBV AG NPH QL: SIGNIFICANT CHANGE UP
GLUCOSE SERPL-MCNC: 111 MG/DL — HIGH (ref 70–99)
GLUCOSE UR QL: NEGATIVE MG/DL — SIGNIFICANT CHANGE UP
HCT VFR BLD CALC: 41.6 % — SIGNIFICANT CHANGE UP (ref 37–47)
HGB BLD-MCNC: 13.9 G/DL — SIGNIFICANT CHANGE UP (ref 12–16)
IMM GRANULOCYTES NFR BLD AUTO: 0.5 % — HIGH (ref 0.1–0.3)
INR BLD: 1.03 RATIO — SIGNIFICANT CHANGE UP (ref 0.65–1.3)
KETONES UR-MCNC: NEGATIVE MG/DL — SIGNIFICANT CHANGE UP
LEUKOCYTE ESTERASE UR-ACNC: NEGATIVE — SIGNIFICANT CHANGE UP
LIDOCAIN IGE QN: 19 U/L — SIGNIFICANT CHANGE UP (ref 7–60)
LYMPHOCYTES # BLD AUTO: 0.69 K/UL — LOW (ref 1.2–3.4)
LYMPHOCYTES # BLD AUTO: 7 % — LOW (ref 20.5–51.1)
MCHC RBC-ENTMCNC: 30.5 PG — SIGNIFICANT CHANGE UP (ref 27–31)
MCHC RBC-ENTMCNC: 33.4 G/DL — SIGNIFICANT CHANGE UP (ref 32–37)
MCV RBC AUTO: 91.2 FL — SIGNIFICANT CHANGE UP (ref 81–99)
MONOCYTES # BLD AUTO: 0.54 K/UL — SIGNIFICANT CHANGE UP (ref 0.1–0.6)
MONOCYTES NFR BLD AUTO: 5.5 % — SIGNIFICANT CHANGE UP (ref 1.7–9.3)
NEUTROPHILS # BLD AUTO: 8.51 K/UL — HIGH (ref 1.4–6.5)
NEUTROPHILS NFR BLD AUTO: 86.6 % — HIGH (ref 42.2–75.2)
NITRITE UR-MCNC: NEGATIVE — SIGNIFICANT CHANGE UP
NRBC # BLD: 0 /100 WBCS — SIGNIFICANT CHANGE UP (ref 0–0)
PH UR: 7.5 — SIGNIFICANT CHANGE UP (ref 5–8)
PLATELET # BLD AUTO: 266 K/UL — SIGNIFICANT CHANGE UP (ref 130–400)
PMV BLD: 9.2 FL — SIGNIFICANT CHANGE UP (ref 7.4–10.4)
POTASSIUM SERPL-MCNC: 5.3 MMOL/L — HIGH (ref 3.5–5)
POTASSIUM SERPL-SCNC: 5.3 MMOL/L — HIGH (ref 3.5–5)
PROT SERPL-MCNC: 7.4 G/DL — SIGNIFICANT CHANGE UP (ref 6–8)
PROT UR-MCNC: 100 MG/DL
PROTHROM AB SERPL-ACNC: 12.2 SEC — SIGNIFICANT CHANGE UP (ref 9.95–12.87)
RBC # BLD: 4.56 M/UL — SIGNIFICANT CHANGE UP (ref 4.2–5.4)
RBC # FLD: 15.1 % — HIGH (ref 11.5–14.5)
RBC CASTS # UR COMP ASSIST: 4 /HPF — SIGNIFICANT CHANGE UP (ref 0–4)
RSV RNA NPH QL NAA+NON-PROBE: SIGNIFICANT CHANGE UP
SARS-COV-2 RNA SPEC QL NAA+PROBE: SIGNIFICANT CHANGE UP
SODIUM SERPL-SCNC: 140 MMOL/L — SIGNIFICANT CHANGE UP (ref 135–146)
SP GR SPEC: 1.01 — SIGNIFICANT CHANGE UP (ref 1–1.03)
SQUAMOUS # UR AUTO: 2 /HPF — SIGNIFICANT CHANGE UP (ref 0–5)
UROBILINOGEN FLD QL: 0.2 MG/DL — SIGNIFICANT CHANGE UP (ref 0.2–1)
WBC # BLD: 9.83 K/UL — SIGNIFICANT CHANGE UP (ref 4.8–10.8)
WBC # FLD AUTO: 9.83 K/UL — SIGNIFICANT CHANGE UP (ref 4.8–10.8)
WBC UR QL: 8 /HPF — HIGH (ref 0–5)

## 2024-12-13 PROCEDURE — 83735 ASSAY OF MAGNESIUM: CPT

## 2024-12-13 PROCEDURE — 99285 EMERGENCY DEPT VISIT HI MDM: CPT

## 2024-12-13 PROCEDURE — 82746 ASSAY OF FOLIC ACID SERUM: CPT

## 2024-12-13 PROCEDURE — 97162 PT EVAL MOD COMPLEX 30 MIN: CPT | Mod: GP

## 2024-12-13 PROCEDURE — 82607 VITAMIN B-12: CPT

## 2024-12-13 PROCEDURE — 72125 CT NECK SPINE W/O DYE: CPT | Mod: 26,MC

## 2024-12-13 PROCEDURE — 72170 X-RAY EXAM OF PELVIS: CPT | Mod: 26

## 2024-12-13 PROCEDURE — 80053 COMPREHEN METABOLIC PANEL: CPT

## 2024-12-13 PROCEDURE — 0241U: CPT

## 2024-12-13 PROCEDURE — 84443 ASSAY THYROID STIM HORMONE: CPT

## 2024-12-13 PROCEDURE — 97116 GAIT TRAINING THERAPY: CPT | Mod: GP

## 2024-12-13 PROCEDURE — 74177 CT ABD & PELVIS W/CONTRAST: CPT | Mod: 26,MC

## 2024-12-13 PROCEDURE — 71045 X-RAY EXAM CHEST 1 VIEW: CPT | Mod: 26

## 2024-12-13 PROCEDURE — 97110 THERAPEUTIC EXERCISES: CPT | Mod: GP

## 2024-12-13 PROCEDURE — 71260 CT THORAX DX C+: CPT | Mod: 26,MC

## 2024-12-13 PROCEDURE — 70450 CT HEAD/BRAIN W/O DYE: CPT | Mod: 26,MC

## 2024-12-13 PROCEDURE — 99222 1ST HOSP IP/OBS MODERATE 55: CPT

## 2024-12-13 PROCEDURE — 86780 TREPONEMA PALLIDUM: CPT

## 2024-12-13 PROCEDURE — 84100 ASSAY OF PHOSPHORUS: CPT

## 2024-12-13 PROCEDURE — 85025 COMPLETE CBC W/AUTO DIFF WBC: CPT

## 2024-12-13 RX ORDER — CEFTRIAXONE SODIUM 1 G
1000 VIAL (EA) INJECTION ONCE
Refills: 0 | Status: COMPLETED | OUTPATIENT
Start: 2024-12-13 | End: 2024-12-13

## 2024-12-13 RX ORDER — CHLORHEXIDINE GLUCONATE 1.2 MG/ML
1 RINSE ORAL
Refills: 0 | Status: DISCONTINUED | OUTPATIENT
Start: 2024-12-13 | End: 2024-12-16

## 2024-12-13 RX ORDER — ONDANSETRON HYDROCHLORIDE 4 MG/1
4 TABLET, FILM COATED ORAL ONCE
Refills: 0 | Status: COMPLETED | OUTPATIENT
Start: 2024-12-13 | End: 2024-12-13

## 2024-12-13 RX ORDER — PANTOPRAZOLE SODIUM 40 MG/1
40 TABLET, DELAYED RELEASE ORAL
Refills: 0 | Status: DISCONTINUED | OUTPATIENT
Start: 2024-12-13 | End: 2024-12-16

## 2024-12-13 RX ORDER — ACETAMINOPHEN 500MG 500 MG/1
650 TABLET, COATED ORAL EVERY 6 HOURS
Refills: 0 | Status: DISCONTINUED | OUTPATIENT
Start: 2024-12-13 | End: 2024-12-16

## 2024-12-13 RX ORDER — ENOXAPARIN SODIUM 30 MG/.3ML
40 INJECTION SUBCUTANEOUS EVERY 24 HOURS
Refills: 0 | Status: DISCONTINUED | OUTPATIENT
Start: 2024-12-13 | End: 2024-12-16

## 2024-12-13 RX ORDER — CEFTRIAXONE SODIUM 1 G
1000 VIAL (EA) INJECTION EVERY 24 HOURS
Refills: 0 | Status: DISCONTINUED | OUTPATIENT
Start: 2024-12-13 | End: 2024-12-16

## 2024-12-13 RX ORDER — ONDANSETRON HYDROCHLORIDE 4 MG/1
4 TABLET, FILM COATED ORAL EVERY 8 HOURS
Refills: 0 | Status: DISCONTINUED | OUTPATIENT
Start: 2024-12-13 | End: 2024-12-16

## 2024-12-13 RX ORDER — 0.9 % SODIUM CHLORIDE 0.9 %
1000 INTRAVENOUS SOLUTION INTRAVENOUS
Refills: 0 | Status: DISCONTINUED | OUTPATIENT
Start: 2024-12-13 | End: 2024-12-14

## 2024-12-13 RX ADMIN — ONDANSETRON HYDROCHLORIDE 4 MILLIGRAM(S): 4 TABLET, FILM COATED ORAL at 17:32

## 2024-12-13 RX ADMIN — Medication 100 MILLIGRAM(S): at 17:33

## 2024-12-13 NOTE — ED ADULT NURSE NOTE - NSFALLHARMRISKINTERV_ED_ALL_ED
Assistance OOB with selected safe patient handling equipment if applicable/Assistance with ambulation/Communicate risk of Fall with Harm to all staff, patient, and family/Monitor gait and stability/Provide patient with walking aids/Provide visual cue: red socks, yellow wristband, yellow gown, etc/Reinforce activity limits and safety measures with patient and family/Use of alarms - bed, stretcher, chair and/or video monitoring/Bed in lowest position, wheels locked, appropriate side rails in place/Call bell, personal items and telephone in reach/Instruct patient to call for assistance before getting out of bed/chair/stretcher/Non-slip footwear applied when patient is off stretcher/[place] to call system/Physically safe environment - no spills, clutter or unnecessary equipment/Purposeful Proactive Rounding/Room/bathroom lighting operational, light cord in reach

## 2024-12-13 NOTE — H&P ADULT - ASSESSMENT
Patient is a 89 year old female with hx of COPD, PUD, diverticulosis s/p resection presenting to ED s/p fall. Patient lives alone and has home health aides. Patient was found by her son who came to check on her as part of his routine. Patient was found having defecated and was tangled on the floor in her bed sheets with a trail of vomit and urine from the kitchen. Patient does not recall fall. She is more lethargic now per sons at bedside. Imaging revealed new compression fracture and possible cystitis.    # fall  # vomiting  # possible UTI  - obtain flu/covid/rsv  - isolation precaution until resulted, dc if negative  - follow urine culture  - follow blood culture  - continue rocephin  - zofran PRN  - LR @ 75/hr overnight  - CHG  - fall risk protocol  - clear liquid diet, advance as tolerated  - TSH, RPR, VB12, Folate    # T7 compression fracture  - pain control  - PT consult  - will obtain TLSO brace if needed    # hx COPD  # hx PUD  # hx diverticulosis  - not on any meds at home  - start PPI    Diet: clears, advance  Activity: OOB  DVT PPX: Lovenox  GI PPX: PPI  CHG:  ordered  Code status: DNR/DNI  Dispo: acute, from home, has aides    HCP Liliana Cancino [phone]

## 2024-12-13 NOTE — ED ADULT NURSE REASSESSMENT NOTE - NS ED NURSE REASSESS COMMENT FT1
As per JC Jackson pt can get transferred upstairs without waiting for RSV results. Pt is going to a private room. Endorsed to Itz SMILEY

## 2024-12-13 NOTE — H&P ADULT - CONVERSATION DETAILS
Medical diagnosis and treatment plan discussed with daughter Liliana (daughter) over the phone with brothers Waqar and Seamus present. Code status discussed and Liliana (HCP) confirmed pt is DNR/DNI.

## 2024-12-13 NOTE — H&P ADULT - HISTORY OF PRESENT ILLNESS
Patient is a 89 year old female with hx of COPD presenting to ED s/p fall. Patient lives alone and has home health aides. Patient was found by her son who came to check on her as part of his routine. Patient was found having defecated and was tangled on the floor in her bed sheets with a trail of vomit and urine from the kitchen. Patient does not recall fall. She is more lethargic now per sons at bedside. Patient has no complaints. Patient is a 89 year old female with hx of COPD, PUD, diverticulosis s/p resection presenting to ED s/p fall. Patient lives alone and has home health aides. Patient was found by her son who came to check on her as part of his routine. Patient was found having defecated and was tangled on the floor in her bed sheets with a trail of vomit and urine from the kitchen. Patient does not recall fall. She is more lethargic now per sons at bedside. Patient has no complaints.

## 2024-12-13 NOTE — ED PROVIDER NOTE - CLINICAL SUMMARY MEDICAL DECISION MAKING FREE TEXT BOX
Patient presented to ED after being found down.  sign out received from Dr. Wheeler pending CT reads.  Labs were ordered and reviewed.  BERKLEY. UTI.  Imaging was ordered and reviewed by me.  New T7 compression deformity.  Appropriate medications for patient's presenting complaints were ordered and effects were reassessed.  Patient's records (prior hospital, ED visit, and/or nursing home notes if available) were reviewed.  Additional history was obtained from family.  Family considering placement for patient at this time.  Escalation to admission/observation was considered.  Patient requires inpatient hospitalization.

## 2024-12-13 NOTE — ED PROVIDER NOTE - OBJECTIVE STATEMENT
89-year-old female past medical history COPD presents emerged department for mechanical fall tripped over her walker no complaints at this time was on the ground for several hours.

## 2024-12-13 NOTE — H&P ADULT - TREATMENT GUIDELINE COMMENT
Patient has made her wishes known with her daughter Liliana who is healthcare proxy and wishes to remain DNR/DNI. Conversation held over the phone with HCP. Patient's sons Waqar and Seamus present at bedside and in agreement.

## 2024-12-13 NOTE — H&P ADULT - NS ATTEND AMEND GEN_ALL_CORE FT
89 year old female with hx of COPD, PUD, diverticulosis s/p resection presenting to ED s/p fall. Patient lives alone and has home health aides. Patient was found by her son. Imaging in the ER revealed new T7 compression fracture and and urinary bladder inflammation, possibly concerning for cystitis. Empirically cover for UTI while awaiting for urine cultures. Check Flu/RSV/COVID swab. IVF hydration. TLSO brace.     Case discussed with PA. Rest of management as above.    Patient seen at bedside, time spent evaluating and treating the patient's acute illness as well as time spent reviewing labs, radiology, discussing with patient and/or patient's family and discussing the case with a multidisciplinary team.

## 2024-12-13 NOTE — H&P ADULT - NSHPPHYSICALEXAM_GEN_ALL_CORE
Vital Signs Last 24 Hrs  T(C): 36.5 (13 Dec 2024 18:06), Max: 36.5 (13 Dec 2024 18:06)  T(F): 97.7 (13 Dec 2024 18:06), Max: 97.7 (13 Dec 2024 18:06)  HR: 96 (13 Dec 2024 18:06) (96 - 110)  BP: 141/70 (13 Dec 2024 18:06) (141/70 - 145/85)  RR: 19 (13 Dec 2024 18:06) (18 - 19)  SpO2: 96% (13 Dec 2024 18:06) (96% - 97%)    Parameters below as of 13 Dec 2024 18:06  Patient On (Oxygen Delivery Method): room air    PHYSICAL EXAM:  GENERAL: NAD, well-developed, confused  HEAD:  Atraumatic, Normocephalic  EYES: EOMI, PERRLA, conjunctiva and sclera clear  CHEST/LUNG: Clear to auscultation bilaterally; No wheeze  HEART: Regular rate and rhythm; No murmurs, rubs, or gallops. distal pulses 2+ and equal bilateral  ABDOMEN: Soft, Nontender, Nondistended; Bowel sounds present  EXTREMITIES:  No clubbing, cyanosis, or edema  CNS: AAOx2, situationally confused

## 2024-12-13 NOTE — ED ADULT TRIAGE NOTE - CHIEF COMPLAINT QUOTE
BIBA pt tripped over her walker and fell.   Pt vomiting coffee ground emesis since yesterday  Denies LOC   Not on blood thinners

## 2024-12-13 NOTE — ED PROVIDER NOTE - PATIENT PORTAL LINK FT
You can access the FollowMyHealth Patient Portal offered by [facility] by registering at the following website: [email or web address]. By joining [facility]’s FollowMyHealth portal, you will also be able to view your health information using other applications (apps) compatible with our system.

## 2024-12-13 NOTE — ED PROVIDER NOTE - EKG/XRAY ADDITIONAL INFORMATION
EKG shows normal sinus rhythm.  Rate is 90.  Occasional PVCs.  Norma GONZALEZ  Nonspecific ST flattening is present.

## 2024-12-13 NOTE — ED PROVIDER NOTE - WR INTERPRETATION DATE TIME  2
Dr. Fair has ordered a bone growth stimulator for Sanchez. Order sheet and information picked up in the office by Jill Edmonds and/or Morales Andrea ([employer]).     13-Dec-2024 17:28

## 2024-12-13 NOTE — H&P ADULT - NSHPLABSRESULTS_GEN_ALL_CORE
13.9   9.83  )-----------( 266      ( 13 Dec 2024 15:55 )             41.6           140  |  103  |  22[H]  ----------------------------<  111[H]  5.3[H]   |  26  |  0.9    Ca    9.6      13 Dec 2024 15:55    TPro  7.4  /  Alb  4.3  /  TBili  0.9  /  DBili  x   /  AST  22  /  ALT  8   /  AlkPhos  81                    Urinalysis Basic - ( 13 Dec 2024 16:15 )    Color: Yellow / Appearance: Clear / S.013 / pH: x  Gluc: x / Ketone: Negative mg/dL  / Bili: Negative / Urobili: 0.2 mg/dL   Blood: x / Protein: 100 mg/dL / Nitrite: Negative   Leuk Esterase: Negative / RBC: 4 /HPF / WBC 8 /HPF   Sq Epi: x / Non Sq Epi: 2 /HPF / Bacteria: Many /HPF        PT/INR - ( 13 Dec 2024 15:55 )   PT: 12.20 sec;   INR: 1.03 ratio         PTT - ( 13 Dec 2024 15:55 )  PTT:31.5 sec    < from: Xray Pelvis AP only (24 @ 16:11) >    No acute displaced fracture.    < end of copied text >    < from: CT Head No Cont (24 @ 16:53) >    HEAD CT:    No evidence of acute intracranial hemorrhage or acute territorial infarct.      C-SPINE CT:    Degenerative changes as described above.    No evidence of fracture or facet subluxation.    < end of copied text >    < from: CT Chest w/ IV Cont (24 @ 16:53) >    IMPRESSION:    1. T7 compression fracture, new from 2023.  2. Heterogeneity in the spleen maybe artifactual and due to phase of   contrast, however laceration cannot be excluded. Close follow-up and   correlation with physical exam recommended.  3. Mild inflammation along the bladder, correlate with urinalysis to   exclude cystitis.  4. Additional chronic/incidental findings as above.    < end of copied text > 13.9   9.83  )-----------( 266      ( 13 Dec 2024 15:55 )             41.6           140  |  103  |  22[H]  ----------------------------<  111[H]  5.3[H]   |  26  |  0.9    Ca    9.6      13 Dec 2024 15:55    TPro  7.4  /  Alb  4.3  /  TBili  0.9  /  DBili  x   /  AST  22  /  ALT  8   /  AlkPhos  81                    Urinalysis Basic - ( 13 Dec 2024 16:15 )    Color: Yellow / Appearance: Clear / S.013 / pH: x  Gluc: x / Ketone: Negative mg/dL  / Bili: Negative / Urobili: 0.2 mg/dL   Blood: x / Protein: 100 mg/dL / Nitrite: Negative   Leuk Esterase: Negative / RBC: 4 /HPF / WBC 8 /HPF   Sq Epi: x / Non Sq Epi: 2 /HPF / Bacteria: Many /HPF        PT/INR - ( 13 Dec 2024 15:55 )   PT: 12.20 sec;   INR: 1.03 ratio         PTT - ( 13 Dec 2024 15:55 )  PTT:31.5 sec    Xray Pelvis AP only (24 @ 16:11)     No acute displaced fracture.        CT Head No Cont (24 @ 16:53)     HEAD CT:    No evidence of acute intracranial hemorrhage or acute territorial infarct.    C-SPINE CT:    Degenerative changes as described above.    No evidence of fracture or facet subluxation.      CT Chest w/ IV Cont (24 @ 16:53)     IMPRESSION:    1. T7 compression fracture, new from 2023.  2. Heterogeneity in the spleen maybe artifactual and due to phase of   contrast, however laceration cannot be excluded. Close follow-up and   correlation with physical exam recommended.  3. Mild inflammation along the bladder, correlate with urinalysis to   exclude cystitis.  4. Additional chronic/incidental findings as above.

## 2024-12-14 LAB
ALBUMIN SERPL ELPH-MCNC: 4.2 G/DL — SIGNIFICANT CHANGE UP (ref 3.5–5.2)
ALP SERPL-CCNC: 70 U/L — SIGNIFICANT CHANGE UP (ref 30–115)
ALT FLD-CCNC: 8 U/L — SIGNIFICANT CHANGE UP (ref 0–41)
ANION GAP SERPL CALC-SCNC: 12 MMOL/L — SIGNIFICANT CHANGE UP (ref 7–14)
AST SERPL-CCNC: 20 U/L — SIGNIFICANT CHANGE UP (ref 0–41)
BASOPHILS # BLD AUTO: 0.06 K/UL — SIGNIFICANT CHANGE UP (ref 0–0.2)
BASOPHILS NFR BLD AUTO: 0.8 % — SIGNIFICANT CHANGE UP (ref 0–1)
BILIRUB SERPL-MCNC: 1 MG/DL — SIGNIFICANT CHANGE UP (ref 0.2–1.2)
BUN SERPL-MCNC: 22 MG/DL — HIGH (ref 10–20)
CALCIUM SERPL-MCNC: 9.4 MG/DL — SIGNIFICANT CHANGE UP (ref 8.4–10.5)
CHLORIDE SERPL-SCNC: 103 MMOL/L — SIGNIFICANT CHANGE UP (ref 98–110)
CO2 SERPL-SCNC: 23 MMOL/L — SIGNIFICANT CHANGE UP (ref 17–32)
CREAT SERPL-MCNC: 1.2 MG/DL — SIGNIFICANT CHANGE UP (ref 0.7–1.5)
EGFR: 41 ML/MIN/1.73M2 — LOW
EOSINOPHIL # BLD AUTO: 0.07 K/UL — SIGNIFICANT CHANGE UP (ref 0–0.7)
EOSINOPHIL NFR BLD AUTO: 0.9 % — SIGNIFICANT CHANGE UP (ref 0–8)
GLUCOSE SERPL-MCNC: 94 MG/DL — SIGNIFICANT CHANGE UP (ref 70–99)
HCT VFR BLD CALC: 41.9 % — SIGNIFICANT CHANGE UP (ref 37–47)
HGB BLD-MCNC: 13.8 G/DL — SIGNIFICANT CHANGE UP (ref 12–16)
IMM GRANULOCYTES NFR BLD AUTO: 0.5 % — HIGH (ref 0.1–0.3)
LYMPHOCYTES # BLD AUTO: 1.27 K/UL — SIGNIFICANT CHANGE UP (ref 1.2–3.4)
LYMPHOCYTES # BLD AUTO: 15.9 % — LOW (ref 20.5–51.1)
MAGNESIUM SERPL-MCNC: 2.2 MG/DL — SIGNIFICANT CHANGE UP (ref 1.8–2.4)
MCHC RBC-ENTMCNC: 30.6 PG — SIGNIFICANT CHANGE UP (ref 27–31)
MCHC RBC-ENTMCNC: 32.9 G/DL — SIGNIFICANT CHANGE UP (ref 32–37)
MCV RBC AUTO: 92.9 FL — SIGNIFICANT CHANGE UP (ref 81–99)
MONOCYTES # BLD AUTO: 0.61 K/UL — HIGH (ref 0.1–0.6)
MONOCYTES NFR BLD AUTO: 7.6 % — SIGNIFICANT CHANGE UP (ref 1.7–9.3)
NEUTROPHILS # BLD AUTO: 5.94 K/UL — SIGNIFICANT CHANGE UP (ref 1.4–6.5)
NEUTROPHILS NFR BLD AUTO: 74.3 % — SIGNIFICANT CHANGE UP (ref 42.2–75.2)
NRBC # BLD: 0 /100 WBCS — SIGNIFICANT CHANGE UP (ref 0–0)
PHOSPHATE SERPL-MCNC: 4.1 MG/DL — SIGNIFICANT CHANGE UP (ref 2.1–4.9)
PLATELET # BLD AUTO: 253 K/UL — SIGNIFICANT CHANGE UP (ref 130–400)
PMV BLD: 9.2 FL — SIGNIFICANT CHANGE UP (ref 7.4–10.4)
POTASSIUM SERPL-MCNC: 4.7 MMOL/L — SIGNIFICANT CHANGE UP (ref 3.5–5)
POTASSIUM SERPL-SCNC: 4.7 MMOL/L — SIGNIFICANT CHANGE UP (ref 3.5–5)
PROT SERPL-MCNC: 7.1 G/DL — SIGNIFICANT CHANGE UP (ref 6–8)
RBC # BLD: 4.51 M/UL — SIGNIFICANT CHANGE UP (ref 4.2–5.4)
RBC # FLD: 15.6 % — HIGH (ref 11.5–14.5)
SODIUM SERPL-SCNC: 138 MMOL/L — SIGNIFICANT CHANGE UP (ref 135–146)
TSH SERPL-MCNC: 1.39 UIU/ML — SIGNIFICANT CHANGE UP (ref 0.27–4.2)
WBC # BLD: 7.99 K/UL — SIGNIFICANT CHANGE UP (ref 4.8–10.8)
WBC # FLD AUTO: 7.99 K/UL — SIGNIFICANT CHANGE UP (ref 4.8–10.8)

## 2024-12-14 PROCEDURE — 99232 SBSQ HOSP IP/OBS MODERATE 35: CPT

## 2024-12-14 RX ADMIN — Medication 100 MILLIGRAM(S): at 17:34

## 2024-12-14 RX ADMIN — ACETAMINOPHEN 500MG 650 MILLIGRAM(S): 500 TABLET, COATED ORAL at 21:14

## 2024-12-14 RX ADMIN — ACETAMINOPHEN 500MG 650 MILLIGRAM(S): 500 TABLET, COATED ORAL at 05:04

## 2024-12-14 RX ADMIN — CHLORHEXIDINE GLUCONATE 1 APPLICATION(S): 1.2 RINSE ORAL at 05:02

## 2024-12-14 RX ADMIN — ACETAMINOPHEN 500MG 650 MILLIGRAM(S): 500 TABLET, COATED ORAL at 21:45

## 2024-12-14 RX ADMIN — Medication 75 MILLILITER(S): at 05:04

## 2024-12-14 RX ADMIN — PANTOPRAZOLE SODIUM 40 MILLIGRAM(S): 40 TABLET, DELAYED RELEASE ORAL at 05:02

## 2024-12-14 NOTE — PHYSICAL THERAPY INITIAL EVALUATION ADULT - PERTINENT HX OF CURRENT PROBLEM, REHAB EVAL
Patient is a 89 year old female with hx of COPD, PUD, diverticulosis s/p resection presenting to ED s/p fall. Patient lives alone and has home health aides. Patient was found by her son who came to check on her as part of his routine. Patient was found having defecated and was tangled on the floor in her bed sheets with a trail of vomit and urine from the kitchen. Patient does not recall fall. She is more lethargic now per sons at bedside. Imaging revealed new compression fracture and possible cystitis.# possible UTI,# T7 compression fracture  - will obtain TLSO brace if needed  -obtain flu/ covid/rsv  - isolation precaution until resulted, dc if negative

## 2024-12-14 NOTE — PATIENT PROFILE ADULT - FALL HARM RISK - HARM RISK INTERVENTIONS
Assistance with ambulation/Assistance OOB with selected safe patient handling equipment/Communicate Risk of Fall with Harm to all staff/Discuss with provider need for PT consult/Monitor gait and stability/Provide patient with walking aids - walker, cane, crutches/Reinforce activity limits and safety measures with patient and family/Sit up slowly, dangle for a short time, stand at bedside before walking/Tailored Fall Risk Interventions/Visual Cue: Yellow wristband and red socks/Bed in lowest position, wheels locked, appropriate side rails in place/Call bell, personal items and telephone in reach/Instruct patient to call for assistance before getting out of bed or chair/Non-slip footwear when patient is out of bed/[place] to call system/Physically safe environment - no spills, clutter or unnecessary equipment/Purposeful Proactive Rounding/Room/bathroom lighting operational, light cord in reach

## 2024-12-14 NOTE — PROGRESS NOTE ADULT - ASSESSMENT
89 year old female with hx of COPD, PUD, diverticulosis s/p resection presenting to ED s/p fall. Patient lives alone and has home health aides. Patient was found by her son who came to check on her as part of his routine. Patient was found having defecated and was tangled on the floor in her bed sheets with a trail of vomit and urine from the kitchen. Patient does not recall fall. Admitted for fall and UTI.    # s/p fall  #vomiting, resolved  # possible UTI  - flu/covid/rsv negative  - follow urine culture  - follow blood culture  - continue rocephin  - zofran PRN  - fall risk protocol  - f/u TSH, RPR, VB12, Folate    # T7 compression fracture  - pain control  - PT consult  - will obtain TLSO brace if needed    # hx COPD  # hx PUD  # hx diverticulosis  - not on any meds at home  - start PPI    Diet: regular  Activity: OOB  DVT PPX: Lovenox  GI PPX: PPI  CHG:  ordered  Code status: DNR/DNI  Dispo: acute, from home, has aides    HCP Liliana Cancino [phone]

## 2024-12-14 NOTE — PHYSICAL THERAPY INITIAL EVALUATION ADULT - ADDITIONAL COMMENTS
As per pt ,She lives with alone in a private house on Ist floor but family lives around there. No steps to enter . She was independent with functional mobility with Rollator , She has had  [facility] for 4 hours,7 days a week ,who help her with ADLs  PTA.

## 2024-12-14 NOTE — PHYSICAL THERAPY INITIAL EVALUATION ADULT - GENERAL OBSERVATIONS, REHAB EVAL
8:50-9:15am,Chart reviewed, pt available for PT, RN notified.  Pt denies pain and is willing to participate with PT.  Pt encountered in recliner +IV attached +Prima fit with NAD .

## 2024-12-14 NOTE — PHYSICAL THERAPY INITIAL EVALUATION ADULT - ASSISTIVE DEVICE FOR TRANSFER: STAND/SIT, REHAB EVAL
Tete plascencia zita amlodipine.  Co the uong them thuo glucosamine chondroitin de gato dawson.    Tete benedict 3 tory elvira.   rolling walker

## 2024-12-15 LAB
FOLATE SERPL-MCNC: 6.5 NG/ML — SIGNIFICANT CHANGE UP
T PALLIDUM AB TITR SER: NEGATIVE — SIGNIFICANT CHANGE UP
VIT B12 SERPL-MCNC: 234 PG/ML — SIGNIFICANT CHANGE UP (ref 232–1245)

## 2024-12-15 PROCEDURE — 99232 SBSQ HOSP IP/OBS MODERATE 35: CPT

## 2024-12-15 RX ADMIN — ENOXAPARIN SODIUM 40 MILLIGRAM(S): 30 INJECTION SUBCUTANEOUS at 21:03

## 2024-12-15 RX ADMIN — CHLORHEXIDINE GLUCONATE 1 APPLICATION(S): 1.2 RINSE ORAL at 05:20

## 2024-12-15 RX ADMIN — ACETAMINOPHEN 500MG 650 MILLIGRAM(S): 500 TABLET, COATED ORAL at 05:16

## 2024-12-15 RX ADMIN — PANTOPRAZOLE SODIUM 40 MILLIGRAM(S): 40 TABLET, DELAYED RELEASE ORAL at 05:16

## 2024-12-15 RX ADMIN — ACETAMINOPHEN 500MG 650 MILLIGRAM(S): 500 TABLET, COATED ORAL at 05:46

## 2024-12-15 RX ADMIN — Medication 100 MILLIGRAM(S): at 17:35

## 2024-12-15 RX ADMIN — ACETAMINOPHEN 500MG 650 MILLIGRAM(S): 500 TABLET, COATED ORAL at 22:55

## 2024-12-15 RX ADMIN — ACETAMINOPHEN 500MG 650 MILLIGRAM(S): 500 TABLET, COATED ORAL at 16:46

## 2024-12-15 NOTE — PROGRESS NOTE ADULT - SUBJECTIVE AND OBJECTIVE BOX
EYAL SNOW 89y Female  MRN#: [id number]     SUBJECTIVE  Patient is a 89y old Female who presents with a chief complaint of fall (14 Dec 2024 12:53)    Interval/Overnight Events:    Today is hospital day 2d, and this morning she is lying in bed without distress.   No acute overnight events.     OBJECTIVE  PAST MEDICAL & SURGICAL HISTORY  Chronic obstructive pulmonary disease  albuterol neb  o2 nc    PUD (peptic ulcer disease)  protonix    Hiatal hernia  as per hx    Diverticulosis  as per hx    Osteoarthritis  as per hx    S/P laparoscopic-assisted sigmoidectomy  as per hx    Surgical history unknown    History of colon resection      ALLERGIES:  No Known Allergies    MEDICATIONS:  STANDING MEDICATIONS  cefTRIAXone   IVPB 1000 milliGRAM(s) IV Intermittent every 24 hours  chlorhexidine 2% Cloths 1 Application(s) Topical <User Schedule>  enoxaparin Injectable 40 milliGRAM(s) SubCutaneous every 24 hours  pantoprazole    Tablet 40 milliGRAM(s) Oral before breakfast    PRN MEDICATIONS  acetaminophen     Tablet .. 650 milliGRAM(s) Oral every 6 hours PRN  ondansetron Injectable 4 milliGRAM(s) IV Push every 8 hours PRN    HOME MEDICATIONS  Home Medications:      LABS:                        13.8   7.99  )-----------( 253      ( 14 Dec 2024 09:15 )             41.9     12-14    138  |  103  |  22[H]  ----------------------------<  94  4.7   |  23  |  1.2    Ca    9.4      14 Dec 2024 09:15  Phos  4.1     12-14  Mg     2.2     12-14    TPro  7.1  /  Alb  4.2  /  TBili  1.0  /  DBili  x   /  AST  20  /  ALT  8   /  AlkPhos  70  12-14    LIVER FUNCTIONS - ( 14 Dec 2024 09:15 )  Alb: 4.2 g/dL / Pro: 7.1 g/dL / ALK PHOS: 70 U/L / ALT: 8 U/L / AST: 20 U/L / GGT: x           PT/INR - ( 13 Dec 2024 15:55 )   PT: 12.20 sec;   INR: 1.03 ratio         PTT - ( 13 Dec 2024 15:55 )  PTT:31.5 sec  Urinalysis Basic - ( 14 Dec 2024 09:15 )    Color: x / Appearance: x / SG: x / pH: x  Gluc: 94 mg/dL / Ketone: x  / Bili: x / Urobili: x   Blood: x / Protein: x / Nitrite: x   Leuk Esterase: x / RBC: x / WBC x   Sq Epi: x / Non Sq Epi: x / Bacteria: x            Urinalysis with Rflx Culture (collected 13 Dec 2024 16:15)          CAPILLARY BLOOD GLUCOSE          PHYSICAL EXAM:  T(C): 36.3 (12-15-24 @ 12:55), Max: 36.4 (12-14-24 @ 20:51)  HR: 90 (12-15-24 @ 12:55) (75 - 99)  BP: 125/75 (12-15-24 @ 12:55) (125/75 - 135/78)  RR: 16 (12-15-24 @ 12:55) (16 - 18)  SpO2: 98% (12-15-24 @ 12:55) (94% - 98%)    GENERAL: NAD, well-developed, 89y  EENT: EOMI, conjunctiva and sclera clear, No nasal obstruction or discharge  RESPIRATORY: Clear to auscultation bilaterally  CARDIOVASCULAR: Regular rate and rhythm  GASTROINTESTINAL: Abdomen Soft, Nontender, Nondistended, +BS  MUSCULOSKELETAL:  No cyanosis, extremities grossly symmetrical  PSYCH: AAOx2, situationally confused  NEUROLOGY: non-focal, cognition grossly intact, MAEE      ADMISSION SUMMARY  Patient is a 89y old Female who presents with a chief complaint of fall (14 Dec 2024 12:53)   
EYAL SNOW 89y Female  MRN#: [id number]     SUBJECTIVE  Patient is a 89y old Female who presents with a chief complaint of fall (13 Dec 2024 18:56)    Interval/Overnight Events:    Today is hospital day 1d, and this morning she is lying in bed without distress.   No acute overnight events.     OBJECTIVE  PAST MEDICAL & SURGICAL HISTORY  Chronic obstructive pulmonary disease  albuterol neb  o2 nc    PUD (peptic ulcer disease)  protonix    Hiatal hernia  as per hx    Diverticulosis  as per hx    Osteoarthritis  as per hx    S/P laparoscopic-assisted sigmoidectomy  as per hx    Surgical history unknown    History of colon resection      ALLERGIES:  No Known Allergies    MEDICATIONS:  STANDING MEDICATIONS  cefTRIAXone   IVPB 1000 milliGRAM(s) IV Intermittent every 24 hours  chlorhexidine 2% Cloths 1 Application(s) Topical <User Schedule>  enoxaparin Injectable 40 milliGRAM(s) SubCutaneous every 24 hours  pantoprazole    Tablet 40 milliGRAM(s) Oral before breakfast    PRN MEDICATIONS  acetaminophen     Tablet .. 650 milliGRAM(s) Oral every 6 hours PRN  ondansetron Injectable 4 milliGRAM(s) IV Push every 8 hours PRN    HOME MEDICATIONS  Home Medications:      LABS:                        13.8   7.99  )-----------( 253      ( 14 Dec 2024 09:15 )             41.9     12-14    138  |  103  |  22[H]  ----------------------------<  94  4.7   |  23  |  1.2    Ca    9.4      14 Dec 2024 09:15  Phos  4.1     12-14  Mg     2.2     12-14    TPro  7.1  /  Alb  4.2  /  TBili  1.0  /  DBili  x   /  AST  20  /  ALT  8   /  AlkPhos  70  12-14    LIVER FUNCTIONS - ( 14 Dec 2024 09:15 )  Alb: 4.2 g/dL / Pro: 7.1 g/dL / ALK PHOS: 70 U/L / ALT: 8 U/L / AST: 20 U/L / GGT: x           PT/INR - ( 13 Dec 2024 15:55 )   PT: 12.20 sec;   INR: 1.03 ratio         PTT - ( 13 Dec 2024 15:55 )  PTT:31.5 sec  Urinalysis Basic - ( 14 Dec 2024 09:15 )    Color: x / Appearance: x / SG: x / pH: x  Gluc: 94 mg/dL / Ketone: x  / Bili: x / Urobili: x   Blood: x / Protein: x / Nitrite: x   Leuk Esterase: x / RBC: x / WBC x   Sq Epi: x / Non Sq Epi: x / Bacteria: x            Urinalysis with Rflx Culture (collected 13 Dec 2024 16:15)          CAPILLARY BLOOD GLUCOSE          PHYSICAL EXAM:  T(C): 36.4 (12-14-24 @ 05:26), Max: 36.5 (12-13-24 @ 18:06)  HR: 81 (12-14-24 @ 05:26) (81 - 110)  BP: 144/76 (12-14-24 @ 05:26) (141/70 - 149/79)  RR: 16 (12-14-24 @ 05:26) (16 - 20)  SpO2: 95% (12-14-24 @ 05:26) (95% - 97%)    GENERAL: NAD, well-developed, 89y  EENT: EOMI, conjunctiva and sclera clear, No nasal obstruction or discharge  RESPIRATORY: Clear to auscultation bilaterally  CARDIOVASCULAR: Regular rate and rhythm  GASTROINTESTINAL: Abdomen Soft, Nontender, Nondistended, +BS  MUSCULOSKELETAL:  No cyanosis, extremities grossly symmetrical  PSYCH: AAOx2, situationally confused  NEUROLOGY: non-focal, cognition grossly intact, MAEE    ADMISSION SUMMARY  Patient is a 89y old Female who presents with a chief complaint of fall (13 Dec 2024 18:56)

## 2024-12-15 NOTE — PROGRESS NOTE ADULT - ASSESSMENT
89 year old female with hx of COPD, PUD, diverticulosis s/p resection presenting to ED s/p fall. Patient lives alone and has home health aides. Patient was found by her son who came to check on her as part of his routine. Patient was found having defecated and was tangled on the floor in her bed sheets with a trail of vomit and urine from the kitchen. Patient does not recall fall. Admitted for fall and UTI.    # s/p fall  #vomiting, resolved  # possible UTI  - flu/covid/rsv negative  - continue rocephin, empirically treat for 5 days  - zofran PRN  - fall risk protocol  - f/u TSH, RPR, VB12, Folate    # T7 compression fracture  - pain control  - PT consult  - will obtain TLSO brace if needed    # hx COPD  # hx PUD  # hx diverticulosis  - not on any meds at home  - start PPI    Diet: regular  Activity: OOB  DVT PPX: Lovenox  GI PPX: PPI  CHG:  ordered  Code status: DNR/DNI  Dispo: acute, from home, has aides    HCP Liliana Cancino [phone]     89 year old female with hx of COPD, PUD, diverticulosis s/p resection presenting to ED s/p fall. Patient lives alone and has home health aides. Patient was found by her son who came to check on her as part of his routine. Patient was found having defecated and was tangled on the floor in her bed sheets with a trail of vomit and urine from the kitchen. Patient does not recall fall. Admitted for fall and UTI.    # s/p fall  #vomiting, resolved  # possible UTI  - flu/covid/rsv negative  - continue rocephin, empirically treat for 5 days  - zofran PRN  - fall risk protocol  - f/u TSH, RPR, VB12, Folate    # T7 compression fracture  - pain control  - PT consult  - will obtain TLSO brace if needed    # hx COPD  # hx PUD  # hx diverticulosis  - not on any meds at home  - start PPI    Diet: regular  Activity: OOB  DVT PPX: Lovenox  GI PPX: PPI  CHG:  ordered  Code status: DNR/DNI  Dispo: acute, from home, has aides    HCP Liliana Cancino [phone]  spoke with daughter over the phone, discussed that patient medically stable and will be anticipated for dc tomorrow. complete 5 day course for UTI

## 2024-12-16 ENCOUNTER — TRANSCRIPTION ENCOUNTER (OUTPATIENT)
Age: 88
End: 2024-12-16

## 2024-12-16 VITALS
SYSTOLIC BLOOD PRESSURE: 123 MMHG | DIASTOLIC BLOOD PRESSURE: 84 MMHG | HEART RATE: 56 BPM | RESPIRATION RATE: 16 BRPM | TEMPERATURE: 98 F | OXYGEN SATURATION: 92 %

## 2024-12-16 PROCEDURE — 99239 HOSP IP/OBS DSCHRG MGMT >30: CPT

## 2024-12-16 RX ORDER — SACCHAROMYCES BOULARDII 250 MG
1 CAPSULE ORAL
Qty: 730 | Refills: 0
Start: 2024-12-16 | End: 2025-12-15

## 2024-12-16 RX ORDER — CEFPODOXIME PROXETIL 100 MG/5ML
1 GRANULE, FOR SUSPENSION ORAL
Qty: 2 | Refills: 0
Start: 2024-12-16 | End: 2024-12-16

## 2024-12-16 RX ADMIN — PANTOPRAZOLE SODIUM 40 MILLIGRAM(S): 40 TABLET, DELAYED RELEASE ORAL at 06:10

## 2024-12-16 RX ADMIN — CHLORHEXIDINE GLUCONATE 1 APPLICATION(S): 1.2 RINSE ORAL at 06:10

## 2024-12-16 NOTE — DIETITIAN INITIAL EVALUATION ADULT - ORAL INTAKE PTA/DIET HISTORY
Patient reports not a big eater. She usually eats 1 meal + snacks throughout the day - this has been her baseline for a while. No vitamin/mineral supplements taken. No oral nutrition supplements taken. Reports no chewing/swallowing difficulties. NKFA, no food intolerances reported.

## 2024-12-16 NOTE — DIETITIAN INITIAL EVALUATION ADULT - OTHER CALCULATIONS
Energy: 1360 - 1632 kcal/day (25-30 kcal/kg)  Protein: 54 - 65 gm/day (1-1.2 gm/kg)  Fluid: 1 mL/kcal  estimated needs with consideration for age, BMI, acuity of illness

## 2024-12-16 NOTE — DISCHARGE NOTE PROVIDER - HOSPITAL COURSE
89 year old female with hx of COPD, PUD, diverticulosis s/p resection presenting to ED s/p fall. Patient lives alone and has home health aides. Patient was found by her son who came to check on her as part of his routine. Patient was found having defecated and was tangled on the floor in her bed sheets with a trail of vomit and urine from the kitchen. Patient does not recall fall. Admitted for fall and UTI.    # s/p fall  #vomiting, resolved  # possible UTI  - flu/covid/rsv negative  - continue rocephin, empirically treat for 5 days, finish course of PO antibiotics with probiotic  - zofran PRN  - fall risk protocol  - TSH, RPR, VB12, Folate- WNL    # T7 compression fracture  - pain control  - PT consult    # hx COPD  # hx PUD  # hx diverticulosis  - not on any meds at home    Patient is medically stable for discharge with outpatient follow up and oral antibiotics. 89 year old female with hx of COPD, PUD, diverticulosis s/p resection presenting to ED s/p fall. Patient lives alone and has home health aides. Patient was found by her son who came to check on her as part of his routine. Patient was found having defecated and was tangled on the floor in her bed sheets with a trail of vomit and urine from the kitchen. Patient does not recall fall. Admitted for fall and UTI.    # s/p fall  #vomiting, resolved  # possible UTI  - flu/covid/rsv negative  - continue rocephin, empirically treat for 5 days, finish course of PO antibiotics with probiotic  - zofran PRN  - fall risk protocol  - TSH, RPR, VB12, Folate- WNL    Patient is medically stable for discharge with outpatient follow up and oral antibiotics.

## 2024-12-16 NOTE — DISCHARGE NOTE NURSING/CASE MANAGEMENT/SOCIAL WORK - PATIENT PORTAL LINK FT
You can access the FollowMyHealth Patient Portal offered by [facility] by registering at the following website: [email or web address]. By joining [employer]’s FollowMyHealth portal, you will also be able to view your health information using other applications (apps) compatible with our system.

## 2024-12-16 NOTE — DISCHARGE NOTE PROVIDER - NSDCHHATTENDCERT_GEN_ALL_CORE
My signature below certifies that the above stated patient is homebound and upon completion of the Face-To-Face encounter, has the need for intermittent skilled nursing, physical therapy and/or speech or occupational therapy services in their home for their current diagnosis as outlined in their initial plan of care. These services will continue to be monitored by myself or another physician.
Home

## 2024-12-16 NOTE — DISCHARGE NOTE PROVIDER - NSDCCPCAREPLAN_GEN_ALL_CORE_FT
PRINCIPAL DISCHARGE DIAGNOSIS  Diagnosis: Acute UTI  Assessment and Plan of Treatment: please finish course of oral antibiotics- sent to pharmacy      SECONDARY DISCHARGE DIAGNOSES  Diagnosis: Thoracic compression fracture  Assessment and Plan of Treatment:      PRINCIPAL DISCHARGE DIAGNOSIS  Diagnosis: Acute UTI  Assessment and Plan of Treatment: please finish course of oral antibiotics- sent to pharmacy      SECONDARY DISCHARGE DIAGNOSES  Diagnosis: Thoracic compression fracture  Assessment and Plan of Treatment: New at T7, you continue to ambulate without pain   monitor for now and follow-up with PMD in 1-2 weeks to evaluate for osteoprosis

## 2024-12-16 NOTE — DISCHARGE NOTE PROVIDER - ATTENDING DISCHARGE PHYSICAL EXAMINATION:
GENERAL: NAD, well-developed, 89y  EENT: EOMI, conjunctiva and sclera clear, No nasal obstruction or discharge  RESPIRATORY: Clear to auscultation bilaterally  CARDIOVASCULAR: Regular rate and rhythm  GASTROINTESTINAL: Abdomen Soft, Nontender, Nondistended, +BS  MUSCULOSKELETAL:  No cyanosis, extremities grossly symmetrical  PSYCH: AAOx2, situationally confused  NEUROLOGY: non-focal, cognition grossly intact, MAEE

## 2024-12-16 NOTE — DISCHARGE NOTE PROVIDER - NSDCMRMEDTOKEN_GEN_ALL_CORE_FT
cefpodoxime 200 mg oral tablet: 1 tab(s) orally 2 times a day Take tomorrow 12/16/24  saccharomyces boulardii lyo 250 mg oral capsule: 1 cap(s) orally 2 times a day

## 2024-12-16 NOTE — DIETITIAN INITIAL EVALUATION ADULT - FACTORS AFF FOOD INTAKE
Called to check on patient.  ? If lidocaine patch helped him at all.  Please let him know that I discussed case with Humphrey Vázquez (Neurology) and Shae Fabian (his pain management)  Plan for neurology consultation and interventional pain management as outpatient for concern for focal neuropathy, or intercostal neuralgia, which may benefit from nerve desensitization.    
Let voicemail.  Gave him triage number to call back to provide update! If he doesn't call by tomorrow, could you please call him?  Thank you!!    
Message per DAI Garcia conveyed to pt. Pt expressed understanding and appreciation for call. Pt states the lidocaine patch helps minutes after placing it, but shortly after the pain returns. Pt advised Dr. Vázquez's office will reach out to pt to schedule office visit to discuss symptoms. Pt also verified appt with Dr. Castellanos on 02/20/2023 to discuss symptoms as well. Pt agreeable to plan, no further questions or concerns at this time.   
acuity of illness, advanced age/other (specify)

## 2024-12-16 NOTE — DISCHARGE NOTE PROVIDER - CARE PROVIDER_API CALL
Pina Tirado  Internal Medicine  [address] [place] [id number]  Phone: [phone]  Fax: ()-  Follow Up Time: 1 week

## 2024-12-16 NOTE — DIETITIAN INITIAL EVALUATION ADULT - PERTINENT MEDS FT
MEDICATIONS  (STANDING):  cefTRIAXone   IVPB 1000 milliGRAM(s) IV Intermittent every 24 hours  chlorhexidine 2% Cloths 1 Application(s) Topical <User Schedule>  enoxaparin Injectable 40 milliGRAM(s) SubCutaneous every 24 hours  pantoprazole    Tablet 40 milliGRAM(s) Oral before breakfast    MEDICATIONS  (PRN):  acetaminophen     Tablet .. 650 milliGRAM(s) Oral every 6 hours PRN Temp greater or equal to 38C (100.4F), Mild Pain (1 - 3)  ondansetron Injectable 4 milliGRAM(s) IV Push every 8 hours PRN Nausea and/or Vomiting

## 2024-12-16 NOTE — DIETITIAN INITIAL EVALUATION ADULT - OTHER INFO
Patient is a 89 year old female with hx of COPD, PUD, diverticulosis s/p resection presenting to ED s/p fall. Patient lives alone and has home health aides. Patient was found by her son who came to check on her as part of his routine. Patient was found having defecated and was tangled on the floor in her bed sheets with a trail of vomit and urine from the kitchen. Patient does not recall fall. Admitted for fall and UTI.    #s/p fall  #vomiting, resolved  #possible UTI  #T7 compression fracture  #hx COPD  #hx PUD  #hx divertulosis    Patient is uncertain of UBW.  Recent weight hx per EMR:  61.2 kg (4/5/24)  vs current dosing weight 54.4 kg (12/13/24)  indicating 12.5% weight loss x 8 months - suspect wt loss possibly related to advanced age and patient's baseline is minimal PO intake     Height per previous admission 149.9 cm

## 2024-12-16 NOTE — DIETITIAN INITIAL EVALUATION ADULT - COLLABORATION WITH OTHER PROVIDERS
Interventions: meal and snacks  Monitoring/Evaluation: energy intake, weight, labs, skin status ,NFPF

## 2024-12-20 DIAGNOSIS — N17.9 ACUTE KIDNEY FAILURE, UNSPECIFIED: ICD-10-CM

## 2024-12-20 DIAGNOSIS — J44.9 CHRONIC OBSTRUCTIVE PULMONARY DISEASE, UNSPECIFIED: ICD-10-CM
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
## 2024-12-20 DIAGNOSIS — N39.0 URINARY TRACT INFECTION, SITE NOT SPECIFIED: ICD-10-CM

## 2024-12-20 DIAGNOSIS — M19.90 UNSPECIFIED OSTEOARTHRITIS, UNSPECIFIED SITE: ICD-10-CM

## 2024-12-20 DIAGNOSIS — Z66 DO NOT RESUSCITATE: ICD-10-CM
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
## 2024-12-20 DIAGNOSIS — S22.069A UNSPECIFIED FRACTURE OF T7-T8 VERTEBRA, INITIAL ENCOUNTER FOR CLOSED FRACTURE: ICD-10-CM

## 2024-12-20 DIAGNOSIS — K27.9 PEPTIC ULCER, SITE UNSPECIFIED, UNSPECIFIED AS ACUTE OR CHRONIC, WITHOUT HEMORRHAGE OR PERFORATION: ICD-10-CM

## 2024-12-20 DIAGNOSIS — Z11.52 ENCOUNTER FOR SCREENING FOR COVID-19: ICD-10-CM

## 2025-01-18 ENCOUNTER — INPATIENT (INPATIENT)
Facility: HOSPITAL | Age: 89
LOS: 2 days | Discharge: HOME | DRG: 178 | End: 2025-01-21
Attending: HOSPITALIST | Admitting: INTERNAL MEDICINE
Payer: MEDICARE

## 2025-01-18 VITALS
WEIGHT: 100.09 LBS | SYSTOLIC BLOOD PRESSURE: 149 MMHG | TEMPERATURE: 98 F | DIASTOLIC BLOOD PRESSURE: 90 MMHG | HEART RATE: 117 BPM | RESPIRATION RATE: 16 BRPM | OXYGEN SATURATION: 99 %

## 2025-01-18 DIAGNOSIS — Z90.49 ACQUIRED ABSENCE OF OTHER SPECIFIED PARTS OF DIGESTIVE TRACT: Chronic | ICD-10-CM

## 2025-01-18 DIAGNOSIS — Z78.9 OTHER SPECIFIED HEALTH STATUS: Chronic | ICD-10-CM

## 2025-01-18 DIAGNOSIS — U07.1 COVID-19: ICD-10-CM

## 2025-01-18 LAB
ALBUMIN SERPL ELPH-MCNC: 3.1 G/DL — LOW (ref 3.5–5.2)
ALBUMIN SERPL ELPH-MCNC: 3.7 G/DL — SIGNIFICANT CHANGE UP (ref 3.5–5.2)
ALP SERPL-CCNC: 59 U/L — SIGNIFICANT CHANGE UP (ref 30–115)
ALP SERPL-CCNC: 68 U/L — SIGNIFICANT CHANGE UP (ref 30–115)
ALT FLD-CCNC: 13 U/L — SIGNIFICANT CHANGE UP (ref 0–41)
ALT FLD-CCNC: 15 U/L — SIGNIFICANT CHANGE UP (ref 0–41)
ANION GAP SERPL CALC-SCNC: 12 MMOL/L — SIGNIFICANT CHANGE UP (ref 7–14)
APPEARANCE UR: ABNORMAL
AST SERPL-CCNC: 25 U/L — SIGNIFICANT CHANGE UP (ref 0–41)
AST SERPL-CCNC: 38 U/L — SIGNIFICANT CHANGE UP (ref 0–41)
BACTERIA # UR AUTO: ABNORMAL /HPF
BASOPHILS # BLD AUTO: 0.04 K/UL — SIGNIFICANT CHANGE UP (ref 0–0.2)
BASOPHILS NFR BLD AUTO: 0.5 % — SIGNIFICANT CHANGE UP (ref 0–1)
BILIRUB DIRECT SERPL-MCNC: 0.8 MG/DL — HIGH (ref 0–0.3)
BILIRUB INDIRECT FLD-MCNC: 0.6 MG/DL — SIGNIFICANT CHANGE UP (ref 0.2–1.2)
BILIRUB SERPL-MCNC: 1.4 MG/DL — HIGH (ref 0.2–1.2)
BILIRUB SERPL-MCNC: 1.8 MG/DL — HIGH (ref 0.2–1.2)
BILIRUB UR-MCNC: ABNORMAL
BUN SERPL-MCNC: 32 MG/DL — HIGH (ref 10–20)
CALCIUM SERPL-MCNC: 9.3 MG/DL — SIGNIFICANT CHANGE UP (ref 8.4–10.5)
CHLORIDE SERPL-SCNC: 100 MMOL/L — SIGNIFICANT CHANGE UP (ref 98–110)
CO2 SERPL-SCNC: 25 MMOL/L — SIGNIFICANT CHANGE UP (ref 17–32)
COLOR SPEC: SIGNIFICANT CHANGE UP
CREAT SERPL-MCNC: 1.3 MG/DL — SIGNIFICANT CHANGE UP (ref 0.7–1.5)
CREAT SERPL-MCNC: 1.3 MG/DL — SIGNIFICANT CHANGE UP (ref 0.7–1.5)
DIFF PNL FLD: NEGATIVE — SIGNIFICANT CHANGE UP
EGFR: 37 ML/MIN/1.73M2 — LOW
EGFR: 37 ML/MIN/1.73M2 — LOW
EOSINOPHIL # BLD AUTO: 0.04 K/UL — SIGNIFICANT CHANGE UP (ref 0–0.7)
EOSINOPHIL NFR BLD AUTO: 0.5 % — SIGNIFICANT CHANGE UP (ref 0–8)
EPI CELLS # UR: PRESENT
FLUAV AG NPH QL: SIGNIFICANT CHANGE UP
FLUBV AG NPH QL: SIGNIFICANT CHANGE UP
GLUCOSE SERPL-MCNC: 107 MG/DL — HIGH (ref 70–99)
GLUCOSE UR QL: NEGATIVE MG/DL — SIGNIFICANT CHANGE UP
HCT VFR BLD CALC: 43.7 % — SIGNIFICANT CHANGE UP (ref 37–47)
HGB BLD-MCNC: 14.7 G/DL — SIGNIFICANT CHANGE UP (ref 12–16)
IMM GRANULOCYTES NFR BLD AUTO: 1.4 % — HIGH (ref 0.1–0.3)
INR BLD: 1.23 RATIO — SIGNIFICANT CHANGE UP (ref 0.65–1.3)
KETONES UR-MCNC: NEGATIVE MG/DL — SIGNIFICANT CHANGE UP
LEUKOCYTE ESTERASE UR-ACNC: ABNORMAL
LYMPHOCYTES # BLD AUTO: 1.09 K/UL — LOW (ref 1.2–3.4)
LYMPHOCYTES # BLD AUTO: 12.9 % — LOW (ref 20.5–51.1)
MAGNESIUM SERPL-MCNC: 2.1 MG/DL — SIGNIFICANT CHANGE UP (ref 1.8–2.4)
MCHC RBC-ENTMCNC: 29.9 PG — SIGNIFICANT CHANGE UP (ref 27–31)
MCHC RBC-ENTMCNC: 33.6 G/DL — SIGNIFICANT CHANGE UP (ref 32–37)
MCV RBC AUTO: 89 FL — SIGNIFICANT CHANGE UP (ref 81–99)
MONOCYTES # BLD AUTO: 0.88 K/UL — HIGH (ref 0.1–0.6)
MONOCYTES NFR BLD AUTO: 10.4 % — HIGH (ref 1.7–9.3)
NEUTROPHILS # BLD AUTO: 6.28 K/UL — SIGNIFICANT CHANGE UP (ref 1.4–6.5)
NEUTROPHILS NFR BLD AUTO: 74.3 % — SIGNIFICANT CHANGE UP (ref 42.2–75.2)
NITRITE UR-MCNC: NEGATIVE — SIGNIFICANT CHANGE UP
NRBC # BLD: 0 /100 WBCS — SIGNIFICANT CHANGE UP (ref 0–0)
NRBC BLD-RTO: 0 /100 WBCS — SIGNIFICANT CHANGE UP (ref 0–0)
PH UR: 5.5 — SIGNIFICANT CHANGE UP (ref 5–8)
PLATELET # BLD AUTO: 146 K/UL — SIGNIFICANT CHANGE UP (ref 130–400)
PMV BLD: 10.8 FL — HIGH (ref 7.4–10.4)
POTASSIUM SERPL-MCNC: 5 MMOL/L — SIGNIFICANT CHANGE UP (ref 3.5–5)
POTASSIUM SERPL-SCNC: 5 MMOL/L — SIGNIFICANT CHANGE UP (ref 3.5–5)
PROT SERPL-MCNC: 6.3 G/DL — SIGNIFICANT CHANGE UP (ref 6–8)
PROT SERPL-MCNC: 7.8 G/DL — SIGNIFICANT CHANGE UP (ref 6–8)
PROT UR-MCNC: 30 MG/DL
PROTHROM AB SERPL-ACNC: 14.6 SEC — HIGH (ref 9.95–12.87)
RBC # BLD: 4.91 M/UL — SIGNIFICANT CHANGE UP (ref 4.2–5.4)
RBC # FLD: 14.5 % — SIGNIFICANT CHANGE UP (ref 11.5–14.5)
RBC CASTS # UR COMP ASSIST: 1 /HPF — SIGNIFICANT CHANGE UP (ref 0–4)
RSV RNA NPH QL NAA+NON-PROBE: SIGNIFICANT CHANGE UP
SARS-COV-2 RNA SPEC QL NAA+PROBE: DETECTED
SODIUM SERPL-SCNC: 137 MMOL/L — SIGNIFICANT CHANGE UP (ref 135–146)
SP GR SPEC: 1.02 — SIGNIFICANT CHANGE UP (ref 1–1.03)
UROBILINOGEN FLD QL: 1 MG/DL — SIGNIFICANT CHANGE UP (ref 0.2–1)
WBC # BLD: 8.45 K/UL — SIGNIFICANT CHANGE UP (ref 4.8–10.8)
WBC # FLD AUTO: 8.45 K/UL — SIGNIFICANT CHANGE UP (ref 4.8–10.8)
WBC UR QL: 12 /HPF — HIGH (ref 0–5)

## 2025-01-18 PROCEDURE — 97162 PT EVAL MOD COMPLEX 30 MIN: CPT | Mod: GP

## 2025-01-18 PROCEDURE — 80053 COMPREHEN METABOLIC PANEL: CPT

## 2025-01-18 PROCEDURE — 36415 COLL VENOUS BLD VENIPUNCTURE: CPT

## 2025-01-18 PROCEDURE — 93010 ELECTROCARDIOGRAM REPORT: CPT

## 2025-01-18 PROCEDURE — 71045 X-RAY EXAM CHEST 1 VIEW: CPT | Mod: 26

## 2025-01-18 PROCEDURE — 99285 EMERGENCY DEPT VISIT HI MDM: CPT | Mod: FS

## 2025-01-18 PROCEDURE — 82565 ASSAY OF CREATININE: CPT

## 2025-01-18 PROCEDURE — 85025 COMPLETE CBC W/AUTO DIFF WBC: CPT

## 2025-01-18 PROCEDURE — 80076 HEPATIC FUNCTION PANEL: CPT

## 2025-01-18 PROCEDURE — 99222 1ST HOSP IP/OBS MODERATE 55: CPT

## 2025-01-18 PROCEDURE — 85610 PROTHROMBIN TIME: CPT

## 2025-01-18 PROCEDURE — 83605 ASSAY OF LACTIC ACID: CPT

## 2025-01-18 RX ORDER — MAGNESIUM, ALUMINUM HYDROXIDE 200-225/5
30 SUSPENSION, ORAL (FINAL DOSE FORM) ORAL EVERY 4 HOURS
Refills: 0 | Status: DISCONTINUED | OUTPATIENT
Start: 2025-01-18 | End: 2025-01-21

## 2025-01-18 RX ORDER — BACTERIOSTATIC SODIUM CHLORIDE 0.9 %
1000 VIAL (ML) INJECTION ONCE
Refills: 0 | Status: COMPLETED | OUTPATIENT
Start: 2025-01-18 | End: 2025-01-18

## 2025-01-18 RX ORDER — ANTISEPTIC SURGICAL SCRUB 0.04 MG/ML
1 SOLUTION TOPICAL
Refills: 0 | Status: DISCONTINUED | OUTPATIENT
Start: 2025-01-18 | End: 2025-01-21

## 2025-01-18 RX ORDER — REMDESIVIR 100 MG/1
200 INJECTION, POWDER, LYOPHILIZED, FOR SOLUTION INTRAVENOUS EVERY 24 HOURS
Refills: 0 | Status: COMPLETED | OUTPATIENT
Start: 2025-01-18 | End: 2025-01-18

## 2025-01-18 RX ORDER — CEFTRIAXONE 250 MG/1
1000 INJECTION, POWDER, FOR SOLUTION INTRAMUSCULAR; INTRAVENOUS EVERY 24 HOURS
Refills: 0 | Status: DISCONTINUED | OUTPATIENT
Start: 2025-01-18 | End: 2025-01-19

## 2025-01-18 RX ORDER — ONDANSETRON 4 MG/1
4 TABLET, ORALLY DISINTEGRATING ORAL EVERY 8 HOURS
Refills: 0 | Status: DISCONTINUED | OUTPATIENT
Start: 2025-01-18 | End: 2025-01-21

## 2025-01-18 RX ORDER — REMDESIVIR 100 MG/1
INJECTION, POWDER, LYOPHILIZED, FOR SOLUTION INTRAVENOUS
Refills: 0 | Status: DISCONTINUED | OUTPATIENT
Start: 2025-01-18 | End: 2025-01-21

## 2025-01-18 RX ORDER — BACTERIOSTATIC SODIUM CHLORIDE 0.9 %
1000 VIAL (ML) INJECTION
Refills: 0 | Status: DISCONTINUED | OUTPATIENT
Start: 2025-01-18 | End: 2025-01-21

## 2025-01-18 RX ORDER — ACETAMINOPHEN 160 MG/5ML
650 SUSPENSION ORAL EVERY 6 HOURS
Refills: 0 | Status: DISCONTINUED | OUTPATIENT
Start: 2025-01-18 | End: 2025-01-21

## 2025-01-18 RX ORDER — REMDESIVIR 100 MG/1
100 INJECTION, POWDER, LYOPHILIZED, FOR SOLUTION INTRAVENOUS EVERY 24 HOURS
Refills: 0 | Status: DISCONTINUED | OUTPATIENT
Start: 2025-01-19 | End: 2025-01-21

## 2025-01-18 RX ADMIN — CEFTRIAXONE 100 MILLIGRAM(S): 250 INJECTION, POWDER, FOR SOLUTION INTRAMUSCULAR; INTRAVENOUS at 21:17

## 2025-01-18 RX ADMIN — Medication 1000 MILLILITER(S): at 17:00

## 2025-01-18 NOTE — ED ADULT TRIAGE NOTE - CHIEF COMPLAINT QUOTE
BIBA as per EMs patient sent for "failing to thrive at home" Aide states patient is not eating as much and not walking as shoe normally does.

## 2025-01-18 NOTE — ED PROVIDER NOTE - CLINICAL SUMMARY MEDICAL DECISION MAKING FREE TEXT BOX
Pt presents with weakness and inability to get up from sitting position. Here found to have COVID. Family unable to care for pt at home. Pt presents with weakness and inability to get up from sitting position. Here found to have COVID. Family unable to care for pt at home. Accepted by Dr. Parker

## 2025-01-18 NOTE — PATIENT PROFILE ADULT - DIETITIAN.
Patient called and requested to come for 24 hour nurse visit.  Patient advised to call to schedule prior to reduce wait time.  
dietitian/nutrition services

## 2025-01-18 NOTE — H&P ADULT - ASSESSMENT
# 2019 novel coronavirus disease (COVID-19)  Secondary Diagnosis:	Dehydration  Secondary Diagnosis:	Acute weakness. 89 year old female with pmhx of dementia, UTIs, GERD, T-7 compression Fx, Crohn's /diverticulitis s/p colon resection many years ago presented to the ED with generalized weakness.    # 2019 novel coronavirus disease (COVID-19)  Secondary Diagnosis:	Dehydration  Secondary Diagnosis:	Acute weakness. 89 year old female with pmhx of dementia, UTIs, GERD, T-7 compression Fx, Crohn's /diverticulitis s/p colon resection many years ago presented to the ED with generalized weakness.    # 2019 novel coronavirus disease (COVID-19)  # UTI  # Dehydration  # Acute weakness  - WBC WNL, afebrile  - am lactate  - ceftriaxone  - redesivir   - ID con  - isolation   - IVF  - f/u Ucx  - monitor for sepsis  - ambulate as tolerated and with assistance  - fall risk  - PT  - am labs    # Dementia  # GERD  - pt not taking any meds    DNR/DNI

## 2025-01-18 NOTE — H&P ADULT - NSHPPHYSICALEXAM_GEN_ALL_CORE
VITALS:   T(C): 36.3 (01-18-25 @ 18:59), Max: 36.4 (01-18-25 @ 16:21)  HR: 89 (01-18-25 @ 18:59) (89 - 117)  BP: 109/76 (01-18-25 @ 18:59) (109/76 - 149/90)  RR: 16 (01-18-25 @ 18:59) (16 - 16)  SpO2: 97% (01-18-25 @ 18:59) (97% - 99%)    GENERAL: NAD, lying in bed comfortably and pleasant  HEAD:  Atraumatic, Normocephalic  EYES: EOMI, conjunctiva and sclera clear  ENT: Moist mucous membranes  NECK: Supple, No JVD  CHEST/LUNG: CTA b/l;  Unlabored respirations  HEART: Regular rate and rhythm; No murmurs  ABDOMEN: BS present; Soft, Nontender, Nondistended  EXTREMITIES:  No clubbing, cyanosis, or edema  NERVOUS SYSTEM:  Alert & Oriented X3, speech clear. No deficits   MSK: FROM all 4 extremities, full and equal strength  SKIN: No rashes or lesions VITALS:   T(C): 36.3 (01-18-25 @ 18:59), Max: 36.4 (01-18-25 @ 16:21)  HR: 89 (01-18-25 @ 18:59) (89 - 117)  BP: 109/76 (01-18-25 @ 18:59) (109/76 - 149/90)  RR: 16 (01-18-25 @ 18:59) (16 - 16)  SpO2: 97% (01-18-25 @ 18:59) (97% - 99%)    GENERAL: NAD, lying in bed comfortably and pleasant  HEAD:  Atraumatic, Normocephalic  EYES: EOMI, conjunctiva and sclera clear  ENT: Moist mucous membranes  NECK: Supple, No JVD  CHEST/LUNG: CTA b/l;  Unlabored respirations  HEART: Regular rate and rhythm; No murmurs  ABDOMEN: BS present; Soft, Nontender, Nondistended  EXTREMITIES:  No clubbing, cyanosis, or edema  NERVOUS SYSTEM:  Alert & Oriented X1, speech clear. No deficits   MSK: FROM all 4 extremities, full and equal strength  SKIN: No rashes or lesions

## 2025-01-18 NOTE — ED PROVIDER NOTE - PHYSICAL EXAMINATION
CONST: Well appearing in NAD  EYES: PERRL, EOMI, Sclera and conjunctiva clear.   ENT: No nasal discharge. Oropharynx normal appearing  NECK: Non-tender, no meningeal signs. normal ROM. supple   CARD: Normal S1 S2; Normal rate and rhythm  RESP: Equal BS B/L, No wheezes, rhonchi or rales. No distress  GI: Soft, non-tender, non-distended. no cva tenderness. normal BS  MS: Normal ROM in all extremities. No midline spinal tenderness. pulses 2 +. no calf tenderness or swelling  SKIN: Warm, dry, no acute rashes. Good turgor  NEURO: A&O, No focal deficits.

## 2025-01-18 NOTE — H&P ADULT - NSICDXPASTMEDICALHX_GEN_ALL_CORE_FT
PAST MEDICAL HISTORY:  Chronic obstructive pulmonary disease albuterol neb  o2 nc    Diverticulosis as per hx    GERD (gastroesophageal reflux disease)     Hiatal hernia as per hx    Osteoarthritis as per hx    PUD (peptic ulcer disease) protonix

## 2025-01-18 NOTE — PATIENT PROFILE ADULT - FALL HARM RISK - HARM RISK INTERVENTIONS
Assistance with ambulation/Assistance OOB with selected safe patient handling equipment/Communicate Risk of Fall with Harm to all staff/Discuss with provider need for PT consult/Monitor gait and stability/Provide patient with walking aids - walker, cane, crutches/Reinforce activity limits and safety measures with patient and family/Sit up slowly, dangle for a short time, stand at bedside before walking/Tailored Fall Risk Interventions/Visual Cue: Yellow wristband and red socks/Bed in lowest position, wheels locked, appropriate side rails in place/Call bell, personal items and telephone in reach/Instruct patient to call for assistance before getting out of bed or chair/Non-slip footwear when patient is out of bed/[place] to call system/Physically safe environment - no spills, clutter or unnecessary equipment/Purposeful Proactive Rounding/Room/bathroom lighting operational, light cord in reach

## 2025-01-18 NOTE — ED PROVIDER NOTE - OBJECTIVE STATEMENT
89 year old female with pmhx of dementia, presents to ed with generalized weakness. pt was brought in by family because has had decrease PO intake, and unable to ambulate much due to weakness. no new confusion, fever, vomiting, diarrhea, falls, urinary symptoms, chest pain, sob or abd pain.

## 2025-01-18 NOTE — H&P ADULT - HISTORY OF PRESENT ILLNESS
89 year old female with pmhx of dementia, presents to ed with generalized weakness. pt was brought in by family because has had decrease PO intake, and unable to ambulate much due to weakness. no new confusion, fever, vomiting, diarrhea, falls, urinary symptoms, chest pain, sob or abd pain. 89 year old female with pmhx of dementia, UTIs, GERD, T-7 compression Fx, Crohn's /diverticulitis s/p colon resection many years ago presented to the ED with generalized weakness. Son states mom has been progressively weakening over the past several day and today she had a hard time getting off the toilet than usual with the normal assistance with her aide  and unable to ambulate much due to her increasing weakness.  Son states mom has decreased PO intake as well and states she may be dehydrated. no new confusion, fever, vomiting, diarrhea, falls, urinary symptoms, chest pain, sob or abd pain.  Pt answers questions, but is AA+Ox1 89 year old female with pmhx of dementia, UTIs, GERD, T-7 compression Fx, Crohn's /diverticulitis s/p colon resection many years ago presented to the ED with generalized weakness. Son states mom has been progressively weakening over the past several day and today she had a hard time getting off the toilet than usual with the normal assistance with her aide  and unable to ambulate much due to her increasing weakness.  Son states mom has decreased PO intake as well and states she may be dehydrated. no new confusion, fever, vomiting, diarrhea, falls, urinary symptoms, chest pain, sob or abd pain, dysuria.   Pt answers questions, but is AA+Ox1

## 2025-01-18 NOTE — ED PROVIDER NOTE - ATTENDING APP SHARED VISIT CONTRIBUTION OF CARE
Pt has a history of mild dementia. No other medical problem and currently not on any meds. Family notes recently decrease in appetite. Decrease fluid intake. Additionally pt has weakened. Last few day was unable to stand after using toilet. Live independently. Aids stay with pt from 11 am to 9 pm daily. Pt has no complaints. No headache, no chest or abdominal pain. Occasionally has burning on urination. Last UTI was  1 month ago. Family feels pt will not be able to take care of herself. On exam well appearing female in no distress. Pleasant. Mouth is dry. S1S2 rrr, 1/6 systolic murmur left sternal border, lungs clear, hearing is good, no abdominal tenderness. Midline surgical scar noted. No edema and no calf tenderness. Awake alert and oriented to place and self.

## 2025-01-18 NOTE — ED ADULT NURSE NOTE - NSICDXFAMHXNEG_GEN_ALL
28 y/o  sPEC on Mag IV here for IOL     Plan:     Continue Labor Mgmt  Plan for vaginal cytotec  Continue EFM, IV fluids, [place]  Plan for cervical balloon when able      Discussed with Dr Mathis       .

## 2025-01-18 NOTE — H&P ADULT - NSHPLABSRESULTS_GEN_ALL_CORE
LABS:  cret                        14.7   8.45  )-----------( 146      ( 18 Jan 2025 18:00 )             43.7     01-18    137  |  100  |  32[H]  ----------------------------<  107[H]  5.0   |  25  |  1.3    Ca    9.3      18 Jan 2025 18:00  Mg     2.1     01-18    TPro  7.8  /  Alb  3.7  /  TBili  1.8[H]  /  DBili  x   /  AST  38  /  ALT  15  /  AlkPhos  68  01-18

## 2025-01-18 NOTE — ED PROVIDER NOTE - CARE PLAN
Principal Discharge DX:	2019 novel coronavirus disease (COVID-19)  Secondary Diagnosis:	Dehydration  Secondary Diagnosis:	Acute weakness   1

## 2025-01-19 LAB
ALBUMIN SERPL ELPH-MCNC: 3.4 G/DL — LOW (ref 3.5–5.2)
ALP SERPL-CCNC: 65 U/L — SIGNIFICANT CHANGE UP (ref 30–115)
ALT FLD-CCNC: 13 U/L — SIGNIFICANT CHANGE UP (ref 0–41)
ANION GAP SERPL CALC-SCNC: 12 MMOL/L — SIGNIFICANT CHANGE UP (ref 7–14)
AST SERPL-CCNC: 21 U/L — SIGNIFICANT CHANGE UP (ref 0–41)
BASOPHILS # BLD AUTO: 0.06 K/UL — SIGNIFICANT CHANGE UP (ref 0–0.2)
BASOPHILS NFR BLD AUTO: 0.9 % — SIGNIFICANT CHANGE UP (ref 0–1)
BILIRUB SERPL-MCNC: 1 MG/DL — SIGNIFICANT CHANGE UP (ref 0.2–1.2)
BUN SERPL-MCNC: 27 MG/DL — HIGH (ref 10–20)
CALCIUM SERPL-MCNC: 8.9 MG/DL — SIGNIFICANT CHANGE UP (ref 8.4–10.5)
CHLORIDE SERPL-SCNC: 104 MMOL/L — SIGNIFICANT CHANGE UP (ref 98–110)
CO2 SERPL-SCNC: 25 MMOL/L — SIGNIFICANT CHANGE UP (ref 17–32)
CREAT SERPL-MCNC: 1.2 MG/DL — SIGNIFICANT CHANGE UP (ref 0.7–1.5)
EGFR: 41 ML/MIN/1.73M2 — LOW
EOSINOPHIL # BLD AUTO: 0.05 K/UL — SIGNIFICANT CHANGE UP (ref 0–0.7)
EOSINOPHIL NFR BLD AUTO: 0.8 % — SIGNIFICANT CHANGE UP (ref 0–8)
GLUCOSE SERPL-MCNC: 107 MG/DL — HIGH (ref 70–99)
HCT VFR BLD CALC: 42.3 % — SIGNIFICANT CHANGE UP (ref 37–47)
HGB BLD-MCNC: 14 G/DL — SIGNIFICANT CHANGE UP (ref 12–16)
IMM GRANULOCYTES NFR BLD AUTO: 1.4 % — HIGH (ref 0.1–0.3)
INR BLD: 1.16 RATIO — SIGNIFICANT CHANGE UP (ref 0.65–1.3)
LACTATE SERPL-SCNC: 1 MMOL/L — SIGNIFICANT CHANGE UP (ref 0.7–2)
LYMPHOCYTES # BLD AUTO: 0.91 K/UL — LOW (ref 1.2–3.4)
LYMPHOCYTES # BLD AUTO: 14 % — LOW (ref 20.5–51.1)
MCHC RBC-ENTMCNC: 29.9 PG — SIGNIFICANT CHANGE UP (ref 27–31)
MCHC RBC-ENTMCNC: 33.1 G/DL — SIGNIFICANT CHANGE UP (ref 32–37)
MCV RBC AUTO: 90.4 FL — SIGNIFICANT CHANGE UP (ref 81–99)
MONOCYTES # BLD AUTO: 0.73 K/UL — HIGH (ref 0.1–0.6)
MONOCYTES NFR BLD AUTO: 11.2 % — HIGH (ref 1.7–9.3)
NEUTROPHILS # BLD AUTO: 4.65 K/UL — SIGNIFICANT CHANGE UP (ref 1.4–6.5)
NEUTROPHILS NFR BLD AUTO: 71.7 % — SIGNIFICANT CHANGE UP (ref 42.2–75.2)
NRBC # BLD: 0 /100 WBCS — SIGNIFICANT CHANGE UP (ref 0–0)
NRBC BLD-RTO: 0 /100 WBCS — SIGNIFICANT CHANGE UP (ref 0–0)
PLATELET # BLD AUTO: 212 K/UL — SIGNIFICANT CHANGE UP (ref 130–400)
PMV BLD: 9.8 FL — SIGNIFICANT CHANGE UP (ref 7.4–10.4)
POTASSIUM SERPL-MCNC: 4.3 MMOL/L — SIGNIFICANT CHANGE UP (ref 3.5–5)
POTASSIUM SERPL-SCNC: 4.3 MMOL/L — SIGNIFICANT CHANGE UP (ref 3.5–5)
PROT SERPL-MCNC: 7 G/DL — SIGNIFICANT CHANGE UP (ref 6–8)
PROTHROM AB SERPL-ACNC: 13.7 SEC — HIGH (ref 9.95–12.87)
RBC # BLD: 4.68 M/UL — SIGNIFICANT CHANGE UP (ref 4.2–5.4)
RBC # FLD: 14.1 % — SIGNIFICANT CHANGE UP (ref 11.5–14.5)
SODIUM SERPL-SCNC: 141 MMOL/L — SIGNIFICANT CHANGE UP (ref 135–146)
WBC # BLD: 6.49 K/UL — SIGNIFICANT CHANGE UP (ref 4.8–10.8)
WBC # FLD AUTO: 6.49 K/UL — SIGNIFICANT CHANGE UP (ref 4.8–10.8)

## 2025-01-19 PROCEDURE — 99233 SBSQ HOSP IP/OBS HIGH 50: CPT

## 2025-01-19 RX ADMIN — REMDESIVIR 200 MILLIGRAM(S): 100 INJECTION, POWDER, LYOPHILIZED, FOR SOLUTION INTRAVENOUS at 21:45

## 2025-01-19 RX ADMIN — Medication 50 MILLILITER(S): at 20:24

## 2025-01-19 RX ADMIN — ANTISEPTIC SURGICAL SCRUB 1 APPLICATION(S): 0.04 SOLUTION TOPICAL at 06:13

## 2025-01-19 RX ADMIN — REMDESIVIR 200 MILLIGRAM(S): 100 INJECTION, POWDER, LYOPHILIZED, FOR SOLUTION INTRAVENOUS at 00:27

## 2025-01-19 RX ADMIN — CEFTRIAXONE 100 MILLIGRAM(S): 250 INJECTION, POWDER, FOR SOLUTION INTRAMUSCULAR; INTRAVENOUS at 20:27

## 2025-01-19 RX ADMIN — Medication 50 MILLILITER(S): at 06:13

## 2025-01-19 NOTE — DIETITIAN INITIAL EVALUATION ADULT - OTHER INFO
pt is 89 year old female with hx of dementia, UTI, gerd, T7 compression fx, crohns with diverticulitis s/p colon resection. p/w weakness. pt found to have  COVID +, UTI and dehydration. DNR/DNI

## 2025-01-19 NOTE — CONSULT NOTE ADULT - SUBJECTIVE AND OBJECTIVE BOX
EYAL SNOW  89y, Female  Allergy: No Known Allergies      CHIEF COMPLAINT:     LOS  1d    HPI:  89 year old female with pmhx of dementia, UTIs, GERD, T-7 compression Fx, Crohn's /diverticulitis s/p colon resection many years ago presented to the ED with generalized weakness. Son states mom has been progressively weakening over the past several day and today she had a hard time getting off the toilet than usual with the normal assistance with her aide  and unable to ambulate much due to her increasing weakness.  Son states mom has decreased PO intake as well and states she may be dehydrated. no new confusion, fever, vomiting, diarrhea, falls, urinary symptoms, chest pain, sob or abd pain, dysuria.   Pt answers questions, but is AA+Ox1 (2025 19:12)      INFECTIOUS DISEASE HISTORY:  History as above.  ID consulted for COVID infection.   Currently on room air.   AO x 1, but says she feels less tired.   Denies any coughing or shortness of breath.   Asking for water.   Denies any nausea, vomiting, abdomina, pain.       PAST MEDICAL & SURGICAL HISTORY:  Chronic obstructive pulmonary disease  albuterol neb  o2 nc      PUD (peptic ulcer disease)  protonix      Hiatal hernia  as per hx      Diverticulosis  as per hx      Osteoarthritis  as per hx      GERD (gastroesophageal reflux disease)      S/P laparoscopic-assisted sigmoidectomy  as per hx      Surgical history unknown      History of colon resection          FAMILY HISTORY  No pertinent family history in first degree relatives    Family history unknown        SOCIAL HISTORY  Social History:  No smoking, drugs or ETOH use  Lives alone with aide (2025 19:12)        ROS  General: Denies rigors, nightsweats  HEENT: Denies headache, rhinorrhea, sore throat, eye pain  CV: Denies CP, palpitations  PULM: Denies wheezing, hemoptysis  GI: Denies hematemesis, hematochezia, melena  : Denies discharge, hematuria  MSK: Denies arthralgias, myalgias  SKIN: Denies rash, lesions  NEURO: Denies paresthesias, weakness  PSYCH: Denies depression, anxiety    VITALS:  T(F): 98.2, Max: 98.2 (25 @ 05:03)  HR: 83  BP: 155/84  RR: 18Vital Signs Last 24 Hrs  T(C): 36.8 (2025 05:03), Max: 36.8 (2025 05:03)  T(F): 98.2 (2025 05:03), Max: 98.2 (2025 05:03)  HR: 83 (2025 05:03) (72 - 117)  BP: 155/84 (2025 05:03) (109/76 - 155/84)  BP(mean): --  RR: 18 (2025 00:26) (16 - 18)  SpO2: 94% (2025 05:03) (94% - 99%)    Parameters below as of 2025 05:03  Patient On (Oxygen Delivery Method): room air        PHYSICAL EXAM:  Gen: NAD, resting in bed  HEENT: Normocephalic, atraumatic  Neck: supple, no lymphadenopathy  CV: Regular rate & regular rhythm  Lungs: decreased BS at bases, no fremitus  Abdomen: Soft, BS present  Ext: Warm, well perfused  Neuro: non focal, awake  Skin: no rash, no erythema  Lines: no phlebitis    TESTS & MEASUREMENTS:                        14.0   6.49  )-----------( x        ( 2025 08:00 )             42.3     -18    x   |  x   |  x   ----------------------------<  x   x    |  x   |  1.3    Ca    9.3      2025 18:00  Mg     2.1         TPro  6.3  /  Alb  3.1[L]  /  TBili  1.4[H]  /  DBili  0.8[H]  /  AST  25  /  ALT  13  /  AlkPhos  59  -18      LIVER FUNCTIONS - ( 2025 21:34 )  Alb: 3.1 g/dL / Pro: 6.3 g/dL / ALK PHOS: 59 U/L / ALT: 13 U/L / AST: 25 U/L / GGT: x           Urinalysis Basic - ( 2025 18:30 )    Color: Dark Yellow / Appearance: Cloudy / S.021 / pH: x  Gluc: x / Ketone: Negative mg/dL  / Bili: Small / Urobili: 1.0 mg/dL   Blood: x / Protein: 30 mg/dL / Nitrite: Negative   Leuk Esterase: Moderate / RBC: 1 /HPF / WBC 12 /HPF   Sq Epi: x / Non Sq Epi: x / Bacteria: Moderate /HPF        Urinalysis with Rflx Culture (collected 25 @ 18:30)    Urinalysis with Rflx Culture (collected 24 @ 16:15)    Culture - Urine (collected 24 @ 10:30)  Source: Clean Catch Clean Catch (Midstream)  Final Report (24 @ 13:30):    50,000 - 99,000 CFU/mL Streptococcus agalactiae (Group B)    Streptococcus agalactiae (Group B) isolated    Group B streptococci are susceptible to ampicillin,    penicillin and cefazolin, but may be resistant to    erythromycin and clindamycin.        Lactate, Blood: 1.0 mmol/L (25 @ 08:00)      INFECTIOUS DISEASES TESTING      RADIOLOGY & ADDITIONAL TESTS:  I have personally reviewed the last Chest xray  CXR      CT      CARDIOLOGY TESTING  12 Lead ECG:   Ventricular Rate 117 BPM    Atrial Rate 117 BPM    P-R Interval 124 ms    QRS Duration 106 ms    Q-T Interval 374 ms    QTC Calculation(Bazett) 521 ms    R Axis -39 degrees    T Axis 106 degrees    Diagnosis Line Sinus tachycardia with Premature atrial complexes  Left axis deviation  Moderate voltage criteria for LVH, may be normal variant ( R in aVL , Vince  product )  Nonspecific ST-T changes  Confirmed by VARGHESE CANNON MD ([id number]) on 2025 7:05:56 AM (25 @ 16:56)      MEDICATIONS  cefTRIAXone   IVPB 1000 IV Intermittent every 24 hours  chlorhexidine 2% Cloths 1 Topical <User Schedule>  remdesivir  IVPB  IV Intermittent   remdesivir  IVPB 100 IV Intermittent every 24 hours  sodium chloride 0.9%. 1000 IV Continuous <Continuous>      Weight  Weight (kg): 45.4 (25 @ 16:21)    ANTIBIOTICS:  cefTRIAXone   IVPB 1000 milliGRAM(s) IV Intermittent every 24 hours  remdesivir  IVPB   IV Intermittent   remdesivir  IVPB 100 milliGRAM(s) IV Intermittent every 24 hours      ALLERGIES:  No Known Allergies

## 2025-01-19 NOTE — DIETITIAN INITIAL EVALUATION ADULT - ORAL INTAKE PTA/DIET HISTORY
pt with hx of dementia, no family at bedside. pt states she consumed a regular diet PTA with good po intake. NKFA or intolerances noted as per EMR review. pt weighed 54.4 kgs on 12/13/24 vs 55.2 kgs today via bedscale. Nutrition hx to obtained from family upon f/u visit       presently on a regular diet as per EMR  pt consumes 25-50% of meals. pt states she lost her appetite, seems receptive to ensure.

## 2025-01-19 NOTE — PROGRESS NOTE ADULT - SUBJECTIVE AND OBJECTIVE BOX
Pt seen, says she feels ok, denies cough. Says she was weaker before but has improved    T(F): , Max: 98.2 (01-19-25 @ 05:03)  HR: 83 (01-19-25 @ 21:10) (72 - 83)  BP: 152/80 (01-19-25 @ 21:10)  RR: 18 (01-19-25 @ 21:10)  SpO2: 95% (01-19-25 @ 21:10)  General: No apparent distress  Cardiovascular: S1, S2  Gastrointestinal: Soft, Non-tender, Non-distended  Respiratory: Good air entry bilaterally  Musculoskeletal: Moves all extremities  Lymphatic: No edema  Neurologic: hard of hearing  Dermatologic: Skin dry  PT/INR - ( 19 Jan 2025 08:00 )   PT: 13.70 sec;   INR: 1.16 ratio                                 14.0   6.49  )-----------( 212      ( 19 Jan 2025 08:00 )             42.3     01-19    141  |  104  |  27[H]  ----------------------------<  107[H]  4.3   |  25  |  1.2    Ca    8.9      19 Jan 2025 08:00  Mg     2.1     01-18    TPro  7.0  /  Alb  3.4[L]  /  TBili  1.0  /  DBili  x   /  AST  21  /  ALT  13  /  AlkPhos  65  01-19      Urinalysis with Rflx Culture (collected 18 Jan 2025 18:30)

## 2025-01-19 NOTE — DIETITIAN INITIAL EVALUATION ADULT - PERTINENT LABORATORY DATA
01-19    141  |  104  |  27[H]  ----------------------------<  107[H]  4.3   |  25  |  1.2    Ca    8.9      19 Jan 2025 08:00  Mg     2.1     01-18    TPro  7.0  /  Alb  3.4[L]  /  TBili  1.0  /  DBili  x   /  AST  21  /  ALT  13  /  AlkPhos  65  01-19                          14.0   6.49  )-----------( 212      ( 19 Jan 2025 08:00 )             42.3

## 2025-01-19 NOTE — PROGRESS NOTE ADULT - ASSESSMENT
89 year old female with pmhx of dementia, UTIs, GERD, T-7 compression Fx, Crohn's /diverticulitis s/p colon resection many years ago presented to the ED with generalized weakness.    # weakness 2/2 COVID  - redesivir x 3 days  - on room air  - essentially, assymptomatic    #assymptomatic bacturia  - no dysuria  -no suprapubic tenderness  - no abx needed    # Dementia  - chroinic    # GERD  - assymptomatic    DNR/DNI    social admission  pending placement at [facility]  medically stable for D/C

## 2025-01-19 NOTE — PHYSICAL THERAPY INITIAL EVALUATION ADULT - PERTINENT HX OF CURRENT PROBLEM, REHAB EVAL
89 y/o female admitted with diagnosis of Infection due to SARS Cov-2; presented to ED with weakness, with inability to get up from sitting position, found to have Covid

## 2025-01-19 NOTE — PHYSICAL THERAPY INITIAL EVALUATION ADULT - GENERAL OBSERVATIONS, REHAB EVAL
11;25-11:50 Chart reviewed. Pt encountered standing in bathroom with RN, may be seen by Physical Therapist as confirmed with Nurse. Patient denied pain and ready to try to walk; +IV lock LUE/ family at bedside

## 2025-01-19 NOTE — CONSULT NOTE ADULT - ASSESSMENT
ASSESSMENT  89 year old female with pmhx of dementia, UTIs, GERD, T-7 compression Fx, Crohn's /diverticulitis s/p colon resection many years ago presented to the ED with generalized weakness.     IMPRESSION  #COVID-19, Mild   #Minimal Pyuria - denies dysuria, new urinary symptoms   #Abx allergy: No Known Allergies    RECOMMENDATIONS  - symptoms likely from COVID infection -- Minimal pyuria -- low suspicious for UTI   - favor monitoring off antibiotics   - RDV for 3 day course  -- monitor Cr and LFTs while on RDV  - supportive care    Please call or message on Microsoft Teams if with any questions.  Spectra [phone]

## 2025-01-19 NOTE — DIETITIAN INITIAL EVALUATION ADULT - PERTINENT MEDS FT
MEDICATIONS  (STANDING):  cefTRIAXone   IVPB 1000 milliGRAM(s) IV Intermittent every 24 hours  remdesivir  IVPB 100 milliGRAM(s) IV Intermittent every 24 hours  sodium chloride 0.9%. 1000 milliLiter(s) (50 mL/Hr) IV Continuous <Continuous>    MEDICATIONS  (PRN):  acetaminophen     Tablet .. 650 milliGRAM(s) Oral every 6 hours PRN Temp greater or equal to 38C (100.4F), Mild Pain (1 - 3)  aluminum hydroxide/magnesium hydroxide/simethicone Suspension 30 milliLiter(s) Oral every 4 hours PRN Dyspepsia  ondansetron Injectable 4 milliGRAM(s) IV Push every 8 hours PRN Nausea and/or Vomiting

## 2025-01-19 NOTE — PHYSICAL THERAPY INITIAL EVALUATION ADULT - GAIT DEVIATIONS NOTED, PT EVAL
stooped posture, dec heel strike/pushoff/decreased leatha/decreased step length/decreased weight-shifting ability

## 2025-01-19 NOTE — PHYSICAL THERAPY INITIAL EVALUATION ADULT - ADDITIONAL COMMENTS
Per family at bedside, there are 2 steps outside with (L) rail going up and one step inside with door on (L) side to hold on to enter home then no further steps inside

## 2025-01-20 LAB
ALBUMIN SERPL ELPH-MCNC: 3.1 G/DL — LOW (ref 3.5–5.2)
ALP SERPL-CCNC: 54 U/L — SIGNIFICANT CHANGE UP (ref 30–115)
ALT FLD-CCNC: 7 U/L — SIGNIFICANT CHANGE UP (ref 0–41)
AST SERPL-CCNC: 13 U/L — SIGNIFICANT CHANGE UP (ref 0–41)
BILIRUB DIRECT SERPL-MCNC: 0.4 MG/DL — HIGH (ref 0–0.3)
BILIRUB INDIRECT FLD-MCNC: 0.3 MG/DL — SIGNIFICANT CHANGE UP (ref 0.2–1.2)
BILIRUB SERPL-MCNC: 0.7 MG/DL — SIGNIFICANT CHANGE UP (ref 0.2–1.2)
CREAT SERPL-MCNC: 0.9 MG/DL — SIGNIFICANT CHANGE UP (ref 0.7–1.5)
EGFR: 58 ML/MIN/1.73M2 — LOW
INR BLD: 1.36 RATIO — HIGH (ref 0.65–1.3)
PROT SERPL-MCNC: 6 G/DL — SIGNIFICANT CHANGE UP (ref 6–8)
PROTHROM AB SERPL-ACNC: 16.2 SEC — HIGH (ref 9.95–12.87)

## 2025-01-20 PROCEDURE — 99233 SBSQ HOSP IP/OBS HIGH 50: CPT

## 2025-01-20 RX ADMIN — Medication 50 MILLILITER(S): at 05:18

## 2025-01-20 RX ADMIN — ANTISEPTIC SURGICAL SCRUB 1 APPLICATION(S): 0.04 SOLUTION TOPICAL at 05:18

## 2025-01-20 RX ADMIN — REMDESIVIR 200 MILLIGRAM(S): 100 INJECTION, POWDER, LYOPHILIZED, FOR SOLUTION INTRAVENOUS at 21:51

## 2025-01-20 RX ADMIN — Medication 50 MILLILITER(S): at 21:34

## 2025-01-20 NOTE — PROGRESS NOTE ADULT - ASSESSMENT
89 year old female with pmhx of dementia, UTIs, GERD, T-7 compression Fx, Crohn's /diverticulitis s/p colon resection many years ago presented to the ED with generalized weakness.    # weakness 2/2 COVID  - redesivir x 3 days, no need for 5 days since pt has been on room air the entire hospital course  - last dose on tuesday  - on room air  - essentially, assymptomatic    #assymptomatic bacturia  - no dysuria  -no suprapubic tenderness  - no abx needed    # Dementia  - chronic stable    # GERD  - assymptomatic    DNR/DNI    social admission  pending placement at [facility]  medically stable for D/C

## 2025-01-20 NOTE — PROGRESS NOTE ADULT - SUBJECTIVE AND OBJECTIVE BOX
Pt feels well, denies cough    T(F): , Max: 97.8 (01-20-25 @ 20:17)  HR: 89 (01-20-25 @ 20:17) (89 - 94)  BP: 153/81 (01-20-25 @ 20:17)  RR: 18 (01-20-25 @ 20:17)  SpO2: 94% (01-20-25 @ 20:17)  IN: 0 mL / OUT: 500 mL / NET: -500 mL      General: No apparent distress  Cardiovascular: S1, S2  Gastrointestinal: Soft, Non-tender, Non-distended  Respiratory: Good air entry bilaterally  Musculoskeletal: Moves all extremities  Lymphatic: No edema  Neurologic: No gross motor deficit  Dermatologic: Skin dry  PT/INR - ( 20 Jan 2025 07:40 )   PT: 16.20 sec;   INR: 1.36 ratio                                 14.0   6.49  )-----------( 212      ( 19 Jan 2025 08:00 )             42.3     01-20    x   |  x   |  x   ----------------------------<  x   x    |  x   |  0.9    Ca    8.9      19 Jan 2025 08:00    TPro  6.0  /  Alb  3.1[L]  /  TBili  0.7  /  DBili  0.4[H]  /  AST  13  /  ALT  7   /  AlkPhos  54  01-20      Urinalysis with Rflx Culture (collected 18 Jan 2025 18:30)    Culture - Urine (collected 18 Jan 2025 18:30)  Source: Clean Catch None  Preliminary Report (20 Jan 2025 19:18):    50,000 - 99,000 CFU/mL Escherichia coli    50,000 - 99,000 CFU/mL Aerococcus urinae    Isolates of Aerococcus spp. are predictably susceptible to penicillin,    ampicillin, and vancomycin. All isolates are resistant to sulfonamides.

## 2025-01-21 ENCOUNTER — TRANSCRIPTION ENCOUNTER (OUTPATIENT)
Age: 89
End: 2025-01-21

## 2025-01-21 VITALS
TEMPERATURE: 98 F | OXYGEN SATURATION: 95 % | HEART RATE: 95 BPM | DIASTOLIC BLOOD PRESSURE: 75 MMHG | SYSTOLIC BLOOD PRESSURE: 128 MMHG | RESPIRATION RATE: 18 BRPM

## 2025-01-21 PROCEDURE — 99239 HOSP IP/OBS DSCHRG MGMT >30: CPT

## 2025-01-21 RX ORDER — CEFPODOXIME PROXETIL 200 MG
1 TABLET ORAL
Qty: 0 | Refills: 0 | DISCHARGE

## 2025-01-21 RX ORDER — ACETAMINOPHEN 160 MG/5ML
2 SUSPENSION ORAL
Qty: 0 | Refills: 0 | DISCHARGE
Start: 2025-01-21

## 2025-01-21 RX ADMIN — ANTISEPTIC SURGICAL SCRUB 1 APPLICATION(S): 0.04 SOLUTION TOPICAL at 05:53

## 2025-01-21 NOTE — DISCHARGE NOTE PROVIDER - NSDCMRMEDTOKEN_GEN_ALL_CORE_FT
acetaminophen 325 mg oral tablet: 2 tab(s) orally every 6 hours As needed Temp greater or equal to 38C (100.4F), Mild Pain (1 - 3)  cefpodoxime 200 mg oral tablet: 1 tab(s) orally 2 times a day last day 1/26

## 2025-01-21 NOTE — DISCHARGE NOTE PROVIDER - NSDCCPCAREPLAN_GEN_ALL_CORE_FT
PRINCIPAL DISCHARGE DIAGNOSIS  Diagnosis: 2019 novel coronavirus disease (COVID-19)  Assessment and Plan of Treatment: You were treated with Remdesivir for 3 days and have returned back to baseline without increased o2 demands.  You will receive ABX for a UTI for 5 days.  You will be discharged to [facility] for further PT treatment      SECONDARY DISCHARGE DIAGNOSES  Diagnosis: Dehydration  Assessment and Plan of Treatment: Resolved    Diagnosis: Acute weakness  Assessment and Plan of Treatment: Resolved

## 2025-01-21 NOTE — DISCHARGE NOTE PROVIDER - ATTENDING DISCHARGE PHYSICAL EXAMINATION:
General: No apparent distress  Cardiovascular: S1, S2  Gastrointestinal: Soft, Non-tender, Non-distended  Respiratory: Good air entry bilaterally  Musculoskeletal: Moves all extremities  Lymphatic: No edema  Neurologic: No gross motor deficit

## 2025-01-21 NOTE — DISCHARGE NOTE PROVIDER - CARE PROVIDER_API CALL
Pina Tirado  Internal Medicine  [address] [place] [address]  Phone: [phone]  Fax: ()-  Follow Up Time:

## 2025-01-21 NOTE — DISCHARGE NOTE NURSING/CASE MANAGEMENT/SOCIAL WORK - PATIENT PORTAL LINK FT
You can access the FollowMyHealth Patient Portal offered by [facility] by registering at the following website: [email or web address]. By joining [facility]’s FollowMyHealth portal, you will also be able to view your health information using other applications (apps) compatible with our system.

## 2025-01-21 NOTE — DISCHARGE NOTE NURSING/CASE MANAGEMENT/SOCIAL WORK - FINANCIAL ASSISTANCE
Patient does not want to make this appointment. She will call when she wants to make it.    [facility] provides services at a reduced cost to those who are determined to be eligible through [facility]’s financial assistance program. Information regarding [facility]’s financial assistance program can be found by going to [email or web address] or by calling [phone].

## 2025-01-21 NOTE — DISCHARGE NOTE PROVIDER - HOSPITAL COURSE
89 year old female with pmhx of dementia, UTIs, GERD, T-7 compression Fx, Crohn's /diverticulitis s/p colon resection many years ago presented to the ED with generalized weakness.    # weakness 2/2 COVID  - s/p Remdesivir x 3 days, no need for 5 days since pt has been on room air the entire hospital course  - on room air  - essentially, asymptomatic    # Dementia  - chronic stable    # GERD  - asymptomatic    #UTI  - UCx + for E. Coli  - initially asymptomatic but developed frequency  - will treat with Vantin 200mg PO BID x 5 days on discharge    DNR/DNI    Will discharge to [facility]

## 2025-01-21 NOTE — DISCHARGE NOTE PROVIDER - CARE PROVIDERS DIRECT ADDRESSES
[phone]      Family had previously requested transfer back to [facility], however Ped Surg wants him to stay here  Care Conference as needed  Last with ENT/surgery 8/15   ,DirectAddress_Unknown

## 2025-01-21 NOTE — PHARMACOTHERAPY INTERVENTION NOTE - COMMENTS
Recommended to discontinue the remdesivir order since patient has already completed 3 days of treatment for mild COVID as per ID recommendations.

## 2025-01-28 DIAGNOSIS — K21.9 GASTRO-ESOPHAGEAL REFLUX DISEASE WITHOUT ESOPHAGITIS: ICD-10-CM

## 2025-01-28 DIAGNOSIS — Z66 DO NOT RESUSCITATE: ICD-10-CM

## 2025-01-28 DIAGNOSIS — E86.0 DEHYDRATION: ICD-10-CM

## 2025-01-28 DIAGNOSIS — U07.1 COVID-19: ICD-10-CM

## 2025-01-28 DIAGNOSIS — K50.90 CROHN'S DISEASE, UNSPECIFIED, WITHOUT COMPLICATIONS: ICD-10-CM

## 2025-03-06 NOTE — PATIENT PROFILE ADULT - PUBLIC BENEFITS
General: A&Ox1, bedbound, turned with 2 assist, incontinent of stool and urine, condom catheter in place. Skin warm, dry with increased moisture in intertriginous folds, adequate skin turgor, scattered areas of hyperpigmentation and hypopigmentation, scattered areas of ecchymosis (without hematoma) on bilateral upper extremities. Blanchable erythema on bilateral heels. +tracheostomy, peristomal skin intact, with increased moisture. PEG with peritubular skin intact. Feedings paused for assessment. Right hip with hyperpigmentation.    Sacrum: stage 2 pressure injury [id number], 100% pink moist dermis, small serosanguinous drainage, no odor. Reepithelialization at wound edges. Periwound with no erythema, no increased warmth, no edema, no induration, no fluctuance no signs or symptoms of overt skin infection. Goals of care: offload pressure, protect from friction and shear, monitor for tissue type changes.     Bilateral ischium with ulcers, initially presented as stage 2 pressure injuries, deteriorated 2/2 critical illness (skin failure markers; hypoperfusion, mechanical ventilation >72 hours, severe protein calorie malnutrition), now improving in size:  ---Left ischium, presents as unstageable pressure injury, 2.5cmx1.5cmx0.3cm (previously 3cmx2.5cmx0.2cm),50% granulation tissue, 25% pink moist dermis, 25% white moist loosely adhered slough, scant serosanguinous drainage, no odor. Periwound with no erythema, no increased warmth, no edema, no induration, no fluctuance no signs or symptoms of overt skin infection.   ---Right ischium, presents  as unstageable pressure injury, 1cmx1.5cmx0.2cm (previously 1.9ywp4uxz4.3cm), 60% tan firmly adhered slough, 40% pink moist dermis, Reepithelialization at wound edges. No drainage, no odor. Periwound with no erythema, no increased warmth, no edema, no induration, no fluctuance no signs or symptoms of overt skin infection.   --- Goals of care: autolytic debridement, offload pressure, protect from friction and shear, monitor for tissue type changes.     Patient continues to be critically ill- at high risk for skin breakdown. On assessment patient on a low air loss surface, heel offloading boots in place, Z-flow positioning pillow utilized, moisture management in place with use of one breathable incontinence pad. Turned and positioned per protocol.      no

## 2025-07-07 NOTE — ED ADULT TRIAGE NOTE - NS ED NURSE BANDS TYPE
Name band; CT scan shows epiploic appendagitis no PE advised NSAIDs supportive care follow-up with PCP

## 2025-07-29 ENCOUNTER — EMERGENCY (EMERGENCY)
Facility: HOSPITAL | Age: 89
LOS: 0 days | Discharge: LEFT BEFORE DISCHARGE | End: 2025-07-29
Attending: STUDENT IN AN ORGANIZED HEALTH CARE EDUCATION/TRAINING PROGRAM
Payer: MEDICARE

## 2025-07-29 VITALS
HEART RATE: 77 BPM | RESPIRATION RATE: 16 BRPM | SYSTOLIC BLOOD PRESSURE: 132 MMHG | TEMPERATURE: 98 F | OXYGEN SATURATION: 95 % | DIASTOLIC BLOOD PRESSURE: 85 MMHG

## 2025-07-29 DIAGNOSIS — Z04.3 ENCOUNTER FOR EXAMINATION AND OBSERVATION FOLLOWING OTHER ACCIDENT: ICD-10-CM

## 2025-07-29 DIAGNOSIS — Z78.9 OTHER SPECIFIED HEALTH STATUS: Chronic | ICD-10-CM

## 2025-07-29 DIAGNOSIS — Z90.49 ACQUIRED ABSENCE OF OTHER SPECIFIED PARTS OF DIGESTIVE TRACT: Chronic | ICD-10-CM

## 2025-07-29 DIAGNOSIS — S09.90XA UNSPECIFIED INJURY OF HEAD, INITIAL ENCOUNTER: ICD-10-CM
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
## 2025-07-29 DIAGNOSIS — Z53.29 PROCEDURE AND TREATMENT NOT CARRIED OUT BECAUSE OF PATIENT'S DECISION FOR OTHER REASONS: ICD-10-CM
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
## 2025-07-29 PROBLEM — K21.9 GASTRO-ESOPHAGEAL REFLUX DISEASE WITHOUT ESOPHAGITIS: Chronic | Status: ACTIVE | Noted: 2025-01-18

## 2025-07-29 LAB
ALBUMIN SERPL ELPH-MCNC: 4.3 G/DL — SIGNIFICANT CHANGE UP (ref 3.5–5.2)
ALP SERPL-CCNC: 69 U/L — SIGNIFICANT CHANGE UP (ref 30–115)
ALT FLD-CCNC: 7 U/L — SIGNIFICANT CHANGE UP (ref 0–41)
ANION GAP SERPL CALC-SCNC: 14 MMOL/L — SIGNIFICANT CHANGE UP (ref 7–14)
AST SERPL-CCNC: 21 U/L — SIGNIFICANT CHANGE UP (ref 0–41)
BASOPHILS # BLD AUTO: 0.05 K/UL — SIGNIFICANT CHANGE UP (ref 0–0.2)
BASOPHILS NFR BLD AUTO: 0.8 % — SIGNIFICANT CHANGE UP (ref 0–1)
BILIRUB SERPL-MCNC: 0.5 MG/DL — SIGNIFICANT CHANGE UP (ref 0.2–1.2)
BUN SERPL-MCNC: 32 MG/DL — HIGH (ref 10–20)
CALCIUM SERPL-MCNC: 9 MG/DL — SIGNIFICANT CHANGE UP (ref 8.4–10.5)
CHLORIDE SERPL-SCNC: 106 MMOL/L — SIGNIFICANT CHANGE UP (ref 98–110)
CO2 SERPL-SCNC: 22 MMOL/L — SIGNIFICANT CHANGE UP (ref 17–32)
CREAT SERPL-MCNC: 1.2 MG/DL — SIGNIFICANT CHANGE UP (ref 0.7–1.5)
EGFR: 41 ML/MIN/1.73M2 — LOW
EGFR: 41 ML/MIN/1.73M2 — LOW
EOSINOPHIL # BLD AUTO: 0.12 K/UL — SIGNIFICANT CHANGE UP (ref 0–0.7)
EOSINOPHIL NFR BLD AUTO: 1.9 % — SIGNIFICANT CHANGE UP (ref 0–8)
GLUCOSE SERPL-MCNC: 99 MG/DL — SIGNIFICANT CHANGE UP (ref 70–99)
HCT VFR BLD CALC: 38.3 % — SIGNIFICANT CHANGE UP (ref 37–47)
HGB BLD-MCNC: 12.2 G/DL — SIGNIFICANT CHANGE UP (ref 12–16)
IMM GRANULOCYTES NFR BLD AUTO: 0.6 % — HIGH (ref 0.1–0.3)
LYMPHOCYTES # BLD AUTO: 1.13 K/UL — LOW (ref 1.2–3.4)
LYMPHOCYTES # BLD AUTO: 17.6 % — LOW (ref 20.5–51.1)
MCHC RBC-ENTMCNC: 29.8 PG — SIGNIFICANT CHANGE UP (ref 27–31)
MCHC RBC-ENTMCNC: 31.9 G/DL — LOW (ref 32–37)
MCV RBC AUTO: 93.6 FL — SIGNIFICANT CHANGE UP (ref 81–99)
MONOCYTES # BLD AUTO: 0.4 K/UL — SIGNIFICANT CHANGE UP (ref 0.1–0.6)
MONOCYTES NFR BLD AUTO: 6.2 % — SIGNIFICANT CHANGE UP (ref 1.7–9.3)
NEUTROPHILS # BLD AUTO: 4.67 K/UL — SIGNIFICANT CHANGE UP (ref 1.4–6.5)
NEUTROPHILS NFR BLD AUTO: 72.9 % — SIGNIFICANT CHANGE UP (ref 42.2–75.2)
NRBC BLD AUTO-RTO: 0 /100 WBCS — SIGNIFICANT CHANGE UP (ref 0–0)
PLATELET # BLD AUTO: 232 K/UL — SIGNIFICANT CHANGE UP (ref 130–400)
PMV BLD: 9.5 FL — SIGNIFICANT CHANGE UP (ref 7.4–10.4)
POTASSIUM SERPL-MCNC: 4.9 MMOL/L — SIGNIFICANT CHANGE UP (ref 3.5–5)
POTASSIUM SERPL-SCNC: 4.9 MMOL/L — SIGNIFICANT CHANGE UP (ref 3.5–5)
PROT SERPL-MCNC: 7.3 G/DL — SIGNIFICANT CHANGE UP (ref 6–8)
RBC # BLD: 4.09 M/UL — LOW (ref 4.2–5.4)
RBC # FLD: 14.8 % — HIGH (ref 11.5–14.5)
SODIUM SERPL-SCNC: 142 MMOL/L — SIGNIFICANT CHANGE UP (ref 135–146)
WBC # BLD: 6.41 K/UL — SIGNIFICANT CHANGE UP (ref 4.8–10.8)
WBC # FLD AUTO: 6.41 K/UL — SIGNIFICANT CHANGE UP (ref 4.8–10.8)

## 2025-07-29 PROCEDURE — 99284 EMERGENCY DEPT VISIT MOD MDM: CPT | Mod: 25

## 2025-07-29 PROCEDURE — 36415 COLL VENOUS BLD VENIPUNCTURE: CPT

## 2025-07-29 PROCEDURE — 85025 COMPLETE CBC W/AUTO DIFF WBC: CPT

## 2025-07-29 PROCEDURE — 80053 COMPREHEN METABOLIC PANEL: CPT

## 2025-07-29 PROCEDURE — 99284 EMERGENCY DEPT VISIT MOD MDM: CPT

## 2025-07-29 NOTE — ED ADULT NURSE REASSESSMENT NOTE - NS ED NURSE REASSESS COMMENT FT1
pt and pt son did not want to stay in hospital, spoke with md barragan and resident jessica jenkins. ama paper signed. transport put in for pt.

## 2025-07-29 NOTE — ED PROVIDER NOTE - PATIENT PORTAL LINK FT
You can access the FollowMyHealth Patient Portal offered by [facility] by registering at the following website: [email or web address]. By joining [facility] [facility]’s FollowMyHealth portal, you will also be able to view your health information using other applications (apps) compatible with our system.

## 2025-07-29 NOTE — ED PROVIDER NOTE - CLINICAL SUMMARY MEDICAL DECISION MAKING FREE TEXT BOX
s/o to Dr Manrique from Dr ling.   Patient declines any pain.  Had extensive discussion with the patient's healthcare proxy son at bedside who wanted the patient to be discharged.  Patient also very adamant on going home prior to obtaining CT results.  Patient resides at nursing home.  Patient was able to stand and ambulate with assistance ambulating at baseline.  Denies any pain on exam.  No extremity deformity.  Patient was signed out AGAINST MEDICAL ADVICE with strict return precautions.  Understands risks of leaving prior to CT imaging as we may not know if she has any ongoing injuries from her fall  pt son opted to take her back to [place] in an Uber.     I had extensive discussion of risks and benefits of pursuing further medical evaluation and/or care with patient and any available family/friends; patient still electing to leave against medical advice. Patient is awake, alert, oriented and demonstrates full capacity and insight into illness. Patient aware and encouraged to return immediately to ED or nearest ED if patient decides to change mind regarding care or if patient experiences any new, worsening, or concerning symptoms.

## 2025-07-29 NOTE — ED PROVIDER NOTE - OBJECTIVE STATEMENT
89-year-old female past medical history as noted presents today for mechanical fall.  Patient was in the restroom when she tripped and fell hitting her head denies loss of consciousness not able to ambulate after the fall no other outward signs of trauma

## 2025-07-29 NOTE — ED PROVIDER NOTE - NSFOLLOWUPINSTRUCTIONS_ED_ALL_ED_FT
Patient left emergency room prior to any CT imaging.  Denied any pain.  Had a unremarkable physical exam.  Patient's son signed the patient out AGAINST MEDICAL ADVICE.  Please send patient back to emergency room if there is any change in mental status, new pain or concernign symptoms Patient left emergency room prior to any CT imaging.  Denied any pain.  Had a unremarkable physical exam.  Patient's son signed the patient out AGAINST MEDICAL ADVICE.  Please send patient back to emergency room if there is any change in mental status, new pain or concerning symptoms

## 2025-07-29 NOTE — ED PROVIDER NOTE - ATTENDING CONTRIBUTION TO CARE
89-year-old female presents to the ED status post unwitnessed fall in the bathroom.  Noted to have head injury but no anticoagulation and no LOC.  Physical exam is overall unremarkable.  Patient is ANO x 2.  We obtained labs along with CT imaging.  Case was signed out pending CT imaging and final disposition.